# Patient Record
Sex: FEMALE | Race: WHITE | Employment: OTHER | URBAN - METROPOLITAN AREA
[De-identification: names, ages, dates, MRNs, and addresses within clinical notes are randomized per-mention and may not be internally consistent; named-entity substitution may affect disease eponyms.]

---

## 2017-03-25 DIAGNOSIS — E78.5 HYPERLIPIDEMIA LDL GOAL <130: ICD-10-CM

## 2017-03-27 RX ORDER — SIMVASTATIN 20 MG
TABLET ORAL
Qty: 90 TABLET | Refills: 1 | Status: SHIPPED | OUTPATIENT
Start: 2017-03-27 | End: 2017-09-25

## 2017-03-27 NOTE — TELEPHONE ENCOUNTER
Routing refill request to provider for review/approval because:  Labs not current:  Lipids out of date since 3/16/16  LOV was August  Would you like patient to come in for lipid lab only appointment?  Please advise.    Meri Cortez RN      simvastatin (ZOCOR) 20 MG tablet     Last Written Prescription Date: 9/28/16  Last Fill Quantity: 90, # refills: 1  Last Office Visit with [facility], [facility] or [facility] prescribing provider: 8/17/16       Lab Results   Component Value Date    CHOL 154 03/16/2016     Lab Results   Component Value Date    HDL 57 03/16/2016     Lab Results   Component Value Date    LDL 77 03/16/2016     Lab Results   Component Value Date    TRIG 99 03/16/2016     Lab Results   Component Value Date    CHOLHDLRATIO 2.8 08/19/2015

## 2017-08-16 ENCOUNTER — HOSPITAL ENCOUNTER (OUTPATIENT)
Dept: MAMMOGRAPHY | Facility: CLINIC | Age: 82
Discharge: HOME | End: 2017-08-16
Attending: PHYSICIAN ASSISTANT | Admitting: PHYSICIAN ASSISTANT
Payer: MEDICARE

## 2017-08-16 DIAGNOSIS — Z12.31 VISIT FOR SCREENING MAMMOGRAM: ICD-10-CM

## 2017-08-16 PROCEDURE — G0202 SCR MAMMO BI INCL CAD: HCPCS

## 2017-09-15 ENCOUNTER — HOSPITAL ENCOUNTER (EMERGENCY)
Facility: CLINIC | Age: 82
Discharge: HOME | End: 2017-09-15
Attending: EMERGENCY MEDICINE | Admitting: EMERGENCY MEDICINE
Payer: MEDICARE

## 2017-09-15 ENCOUNTER — OFFICE VISIT (OUTPATIENT)
Dept: URGENT CARE | Facility: URGENT CARE | Age: 82
End: 2017-09-15

## 2017-09-15 ENCOUNTER — APPOINTMENT (OUTPATIENT)
Dept: GENERAL RADIOLOGY | Facility: CLINIC | Age: 82
End: 2017-09-15
Attending: EMERGENCY MEDICINE
Payer: MEDICARE

## 2017-09-15 VITALS
HEIGHT: 60 IN | TEMPERATURE: 97.5 F | BODY MASS INDEX: 22.38 KG/M2 | DIASTOLIC BLOOD PRESSURE: 71 MMHG | RESPIRATION RATE: 20 BRPM | WEIGHT: 114 LBS | HEART RATE: 77 BPM | OXYGEN SATURATION: 98 % | SYSTOLIC BLOOD PRESSURE: 183 MMHG

## 2017-09-15 VITALS
TEMPERATURE: 97 F | DIASTOLIC BLOOD PRESSURE: 59 MMHG | OXYGEN SATURATION: 95 % | HEART RATE: 82 BPM | SYSTOLIC BLOOD PRESSURE: 139 MMHG

## 2017-09-15 DIAGNOSIS — R53.81 MALAISE AND FATIGUE: Primary | ICD-10-CM

## 2017-09-15 DIAGNOSIS — N39.0 URINARY TRACT INFECTION WITHOUT HEMATURIA, SITE UNSPECIFIED: ICD-10-CM

## 2017-09-15 DIAGNOSIS — R53.83 MALAISE AND FATIGUE: Primary | ICD-10-CM

## 2017-09-15 DIAGNOSIS — R53.83 FATIGUE, UNSPECIFIED TYPE: ICD-10-CM

## 2017-09-15 DIAGNOSIS — I35.1 AORTIC VALVE REGURGITATION, UNSPECIFIED ETIOLOGY: ICD-10-CM

## 2017-09-15 LAB
ALBUMIN UR-MCNC: NEGATIVE MG/DL
ANION GAP SERPL CALCULATED.3IONS-SCNC: 6 MMOL/L (ref 3–14)
APPEARANCE UR: ABNORMAL
BACTERIA #/AREA URNS HPF: ABNORMAL /HPF
BASOPHILS # BLD AUTO: 0 10E9/L (ref 0–0.2)
BASOPHILS NFR BLD AUTO: 0.5 %
BILIRUB UR QL STRIP: NEGATIVE
BUN SERPL-MCNC: 18 MG/DL (ref 7–30)
CALCIUM SERPL-MCNC: 8.6 MG/DL (ref 8.5–10.1)
CHLORIDE SERPL-SCNC: 106 MMOL/L (ref 94–109)
CO2 SERPL-SCNC: 28 MMOL/L (ref 20–32)
COLOR UR AUTO: YELLOW
CREAT SERPL-MCNC: 0.99 MG/DL (ref 0.52–1.04)
DIFFERENTIAL METHOD BLD: NORMAL
EOSINOPHIL # BLD AUTO: 0.2 10E9/L (ref 0–0.7)
EOSINOPHIL NFR BLD AUTO: 2.7 %
ERYTHROCYTE [DISTWIDTH] IN BLOOD BY AUTOMATED COUNT: 13.5 % (ref 10–15)
GFR SERPL CREATININE-BSD FRML MDRD: 53 ML/MIN/1.7M2
GLUCOSE SERPL-MCNC: 92 MG/DL (ref 70–99)
GLUCOSE UR STRIP-MCNC: NEGATIVE MG/DL
HCT VFR BLD AUTO: 46.6 % (ref 35–47)
HGB BLD-MCNC: 15.6 G/DL (ref 11.7–15.7)
HGB UR QL STRIP: NEGATIVE
IMM GRANULOCYTES # BLD: 0 10E9/L (ref 0–0.4)
IMM GRANULOCYTES NFR BLD: 0.2 %
KETONES UR STRIP-MCNC: NEGATIVE MG/DL
LEUKOCYTE ESTERASE UR QL STRIP: ABNORMAL
LYMPHOCYTES # BLD AUTO: 2.4 10E9/L (ref 0.8–5.3)
LYMPHOCYTES NFR BLD AUTO: 35.6 %
MCH RBC QN AUTO: 32.1 PG (ref 26.5–33)
MCHC RBC AUTO-ENTMCNC: 33.5 G/DL (ref 31.5–36.5)
MCV RBC AUTO: 96 FL (ref 78–100)
MONOCYTES # BLD AUTO: 0.7 10E9/L (ref 0–1.3)
MONOCYTES NFR BLD AUTO: 10.2 %
MUCOUS THREADS #/AREA URNS LPF: PRESENT /LPF
NEUTROPHILS # BLD AUTO: 3.4 10E9/L (ref 1.6–8.3)
NEUTROPHILS NFR BLD AUTO: 50.8 %
NITRATE UR QL: NEGATIVE
NRBC # BLD AUTO: 0 10*3/UL
NRBC BLD AUTO-RTO: 0 /100
PH UR STRIP: 5.5 PH (ref 5–7)
PLATELET # BLD AUTO: 195 10E9/L (ref 150–450)
POTASSIUM SERPL-SCNC: 4.4 MMOL/L (ref 3.4–5.3)
RBC # BLD AUTO: 4.86 10E12/L (ref 3.8–5.2)
RBC #/AREA URNS AUTO: ABNORMAL /HPF
SODIUM SERPL-SCNC: 140 MMOL/L (ref 133–144)
SOURCE: ABNORMAL
SP GR UR STRIP: 1.02 (ref 1–1.03)
TROPONIN I SERPL-MCNC: <0.015 UG/L (ref 0–0.04)
TSH SERPL DL<=0.005 MIU/L-ACNC: 2.47 MU/L (ref 0.4–4)
UROBILINOGEN UR STRIP-ACNC: 0.2 EU/DL (ref 0.2–1)
WBC # BLD AUTO: 6.7 10E9/L (ref 4–11)
WBC #/AREA URNS AUTO: ABNORMAL /HPF

## 2017-09-15 PROCEDURE — 85025 COMPLETE CBC W/AUTO DIFF WBC: CPT | Performed by: EMERGENCY MEDICINE

## 2017-09-15 PROCEDURE — 81001 URINALYSIS AUTO W/SCOPE: CPT | Performed by: EMERGENCY MEDICINE

## 2017-09-15 PROCEDURE — 87086 URINE CULTURE/COLONY COUNT: CPT | Performed by: EMERGENCY MEDICINE

## 2017-09-15 PROCEDURE — 71020 XR CHEST 2 VW: CPT

## 2017-09-15 PROCEDURE — 99285 EMERGENCY DEPT VISIT HI MDM: CPT | Mod: 25

## 2017-09-15 PROCEDURE — 93005 ELECTROCARDIOGRAM TRACING: CPT

## 2017-09-15 PROCEDURE — 25000132 ZZH RX MED GY IP 250 OP 250 PS 637: Mod: GY | Performed by: EMERGENCY MEDICINE

## 2017-09-15 PROCEDURE — 84443 ASSAY THYROID STIM HORMONE: CPT | Performed by: EMERGENCY MEDICINE

## 2017-09-15 PROCEDURE — 80048 BASIC METABOLIC PNL TOTAL CA: CPT | Performed by: EMERGENCY MEDICINE

## 2017-09-15 PROCEDURE — 84484 ASSAY OF TROPONIN QUANT: CPT | Performed by: EMERGENCY MEDICINE

## 2017-09-15 PROCEDURE — A9270 NON-COVERED ITEM OR SERVICE: HCPCS | Mod: GY | Performed by: EMERGENCY MEDICINE

## 2017-09-15 RX ORDER — CIPROFLOXACIN 500 MG/1
500 TABLET, FILM COATED ORAL ONCE
Status: COMPLETED | OUTPATIENT
Start: 2017-09-15 | End: 2017-09-15

## 2017-09-15 RX ORDER — CIPROFLOXACIN 500 MG/1
500 TABLET, FILM COATED ORAL 2 TIMES DAILY
Qty: 10 TABLET | Refills: 0 | Status: SHIPPED | OUTPATIENT
Start: 2017-09-15 | End: 2017-09-20

## 2017-09-15 RX ADMIN — CIPROFLOXACIN HYDROCHLORIDE 500 MG: 500 TABLET, FILM COATED ORAL at 22:47

## 2017-09-15 ASSESSMENT — ENCOUNTER SYMPTOMS
SHORTNESS OF BREATH: 0
NAUSEA: 0
FATIGUE: 1
ABDOMINAL PAIN: 0
WEAKNESS: 1

## 2017-09-15 NOTE — ED AVS SNAPSHOT
Emergency Department    [address] [place] [address]    Phone:  [phone]    Fax:  [phone]                                       Polly De La Torre   MRN: [id number]    Department:   Emergency Department   Date of Visit:  9/15/2017           After Visit Summary Signature Page     I have received my discharge instructions, and my questions have been answered. I have discussed any challenges I see with this plan with the nurse or doctor.    ..........................................................................................................................................  Patient/Patient Representative Signature      ..........................................................................................................................................  Patient Representative Print Name and Relationship to Patient    ..................................................               ................................................  Date                                            Time    ..........................................................................................................................................  Reviewed by Signature/Title    ...................................................              ..............................................  Date                                                            Time

## 2017-09-15 NOTE — ED AVS SNAPSHOT
Emergency Department    [address] [facility] [address]    Phone:  [phone]    Fax:  [phone]                                       Polly De La Torre   MRN: [id number]    Department:   Emergency Department   Date of Visit:  9/15/2017           Patient Information     Date Of Birth          9/11/1931        Your diagnoses for this visit were:     Urinary tract infection without hematuria, site unspecified     Fatigue, unspecified type     Aortic valve regurgitation, unspecified etiology        You were seen by Debbie Pierre MD.      Follow-up Information     Follow up with Theresa Frank DO. Schedule an appointment as soon as possible for a visit in 3 days.    Specialty:  Internal Medicine    Contact information:    [address] [address] [phone]          Follow up with  Emergency Department.    Specialty:  EMERGENCY MEDICINE    Why:  As needed, If symptoms worsen    Contact information:    [address] [facility] [address] [phone]        Discharge Instructions       Discharge Instructions  Urinary Tract Infection  You have urinary tract infection, or UTI. The urinary tract includes the kidneys (which make urine), ureters (the tubes that carry urine from the kidneys to the bladder), the bladder (which stores urine), and urethra (the tube that carries urine out of the bladder).  Urinary tract infections occur when bacteria travel up the urethra into the bladder. We suspect a UTI based on chemical and microscopic findings in your urine, but if there is a question about your findings, we will do a culture to see if bacteria grow. A urine culture takes several days. You should always follow-up with your primary physician to find out about results of your culture if one was done.   Return to the Emergency Department if:    You have severe back pain.    You are vomiting so that you can t take your medicine, or have signs of  dehydration (such as urinating less than 3 times per day).    You have fever over 101.5 degrees F.    You have significant confusion or are very weak, or feel very ill.    Your child seems much more ill, won t wake up, won t respond right, or is crying for a long time and won t calm down.    Your child is showing signs of dehydration, Signs of dehydration can be:  o Your infant has had no wet diapers in 4-5 hours.  o Your older child has not passed urine in 6-8 hours.  o Your infant or child starts to have dry mouth and lips, or no saliva or tears.    Follow-up with your doctor:     Children under 24 months need to be seen by their regular doctor within one week after a diagnosis of a UTI. It may be necessary to do some imaging tests to look at the child s kidney or bladder.    You should begin to feel better within 24 - 48 hours of starting your antibiotic.  If you do not, you need to be seen again.      Treatment:     You will be treated with an antibiotic to kill the bacteria. We have to make an educated guess as to which antibiotic will work for your infection. In most healthy people, we can guess right almost all of the time. Sometimes a culture is done to show which antibiotics will work. This usually takes 2-3 days. When the culture is done, we may have to contact you to put you on a different antibiotic.    Take a pain medication such as Tylenol  (acetaminophen), Advil  (ibuprofen), Nuprin  (ibuprofen), or Aleve  (naproxen). If you have been given a narcotic such as Vicodin  (hydrocodone with acetaminophen), Percocet  (oxycodone with acetaminophen), or codeine, do not drive for four hours after you have taken it. If the narcotic contains Tylenol  (acetaminophen), do not take Tylenol  with it. All narcotics will cause constipation, so eat a high fiber diet.      Pyridium  (phenazopyridine) or Uristat  (phenazopyridine) is a prescription medication that numbs the bladder to reduce the burning pain of some  "UTIs.  The same medication is available in a non-prescription version called Azo-Standard  (phenazopyridine), Urodol  (phenazopyridine), or other brand names. This medication will change the color of the urine and tears (usually blue or orange). If you wear contacts, do not wear them while taking this medication as they may be stained by the medication.    Antibiotic Warning:     If you have been placed on antibiotics - watch for signs of allergic reaction.  These include rash, lip swelling, difficulty breathing, wheezing, and dizziness.  If you develop any of these symptoms, stop the antibiotic immediately and go to an emergency room or urgent care for evaluation.    Probiotics: If you have been given an antibiotic, you may want to also take a probiotic pill or eat yogurt with live cultures. Probiotics have \"good bacteria\" to help your intestines stay healthy. Studies have shown that probiotics help prevent diarrhea and other intestine problems (including C. diff infection) when you take antibiotics. You can buy these without a prescription in the pharmacy section of the store.   If you were given a prescription for medicine here today, be sure to read all of the information (including the package insert) that comes with your prescription.  This will include important information about the medicine, its side effects, and any warnings that you need to know about.  The pharmacist who fills the prescription can provide more information and answer questions you may have about the medicine.  If you have questions or concerns that the pharmacist cannot address, please call or return to the Emergency Department.   Opioid Medication Information    Pain medications are among the most commonly prescribed medicines, so we are including this information for all our patients. If you did not receive pain medication or get a prescription for pain medicine, you can ignore it.     You may have been given a prescription for an " opioid (narcotic) pain medicine and/or have received a pain medicine while here in the Emergency Department. These medicines can make you drowsy or impaired. You must not drive, operate dangerous equipment, or engage in any other dangerous activities while taking these medications. If you drive while taking these medications, you could be arrested for DUI, or driving under the influence. Do not drink any alcohol while you are taking these medications.     Opioid pain medications can cause addiction. If you have a history of chemical dependency of any type, you are at a higher risk of becoming addicted to pain medications.  Only take these prescribed medications to treat your pain when all other options have been tried. Take it for as short a time and as few doses as possible. Store your pain pills in a secure place, as they are frequently stolen and provide a dangerous opportunity for children or visitors in your house to start abusing these powerful medications. We will not replace any lost or stolen medicine.  As soon as your pain is better, you should flush all your remaining medication.     Many prescription pain medications contain Tylenol  (acetaminophen), including Vicodin , Tylenol #3 , Norco , Lortab , and Percocet .  You should not take any extra pills of Tylenol  if you are using these prescription medications or you can get very sick.  Do not ever take more than 3000 mg of acetaminophen in any 24 hour period.    All opioids tend to cause constipation. Drink plenty of water and eat foods that have a lot of fiber, such as fruits, vegetables, prune juice, apple juice and high fiber cereal.  Take a laxative if you don t move your bowels at least every other day. Miralax , Milk of Magnesia, Colace , or Senna  can be used to keep you regular.      Remember that you can always come back to the Emergency Department if you are not able to see your regular doctor in the amount of time listed above, if you get any  new symptoms, or if there is anything that worries you.        Future Appointments        Provider Department Dept Phone Center    10/13/2017 3:30 PM Threesa Frank,  [facility] [phone]       24 Hour Appointment Hotline       To make an appointment at any [facility], call [phone] ([phone]). If you don't have a family doctor or clinic, we will help you find one. [facility] are conveniently located to serve the needs of you and your family.             Review of your medicines      START taking        Dose / Directions Last dose taken    ciprofloxacin 500 MG tablet   Commonly known as:  CIPRO   Dose:  500 mg   Quantity:  10 tablet        Take 1 tablet (500 mg) by mouth 2 times daily for 5 days   Refills:  0          Our records show that you are taking the medicines listed below. If these are incorrect, please call your family doctor or clinic.        Dose / Directions Last dose taken    aspirin 81 MG tablet   Dose:  81 mg        Take 1 tablet (81 mg) by mouth daily   Refills:  0        simvastatin 20 MG tablet   Commonly known as:  ZOCOR   Quantity:  90 tablet        TAKE 1 TABLET AT BEDTIME   Refills:  1        TYLENOL 500 MG tablet   Dose:  500-1000 mg   Generic drug:  acetaminophen        Take 500-1,000 mg by mouth every 6 hours as needed for mild pain   Refills:  0                Prescriptions were sent or printed at these locations (1 Prescription)                   Other Prescriptions                Printed at Department/Unit printer (1 of 1)         ciprofloxacin (CIPRO) 500 MG tablet                Procedures and tests performed during your visit     Basic metabolic panel    CBC with platelets differential    Chest XR,  PA & LAT    EKG 12-lead, tracing only    TSH    Troponin I    UA reflex to Microscopic and Culture    Urine Culture Aerobic Bacterial    Urine Microscopic      Orders Needing Specimen Collection     None      Pending Results     Date and Time  Order Name Status Description    9/15/2017 2127 Urine Culture Aerobic Bacterial In process             Pending Culture Results     Date and Time Order Name Status Description    9/15/2017 2127 Urine Culture Aerobic Bacterial In process             Pending Results Instructions     If you had any lab results that were not finalized at the time of your Discharge, you can call the ED Lab Result RN at [phone]. You will be contacted by this team for any positive Lab results or changes in treatment. The nurses are available 7 days a week from 10A to 6:30P.  You can leave a message 24 hours per day and they will return your call.        Test Results From Your Hospital Stay        9/15/2017  9:27 PM      Component Results     Component Value Ref Range & Units Status    WBC 6.7 4.0 - 11.0 10e9/L Final    RBC Count 4.86 3.8 - 5.2 10e12/L Final    Hemoglobin 15.6 11.7 - 15.7 g/dL Final    Hematocrit 46.6 35.0 - 47.0 % Final    MCV 96 78 - 100 fl Final    MCH 32.1 26.5 - 33.0 pg Final    MCHC 33.5 31.5 - 36.5 g/dL Final    RDW 13.5 10.0 - 15.0 % Final    Platelet Count 195 150 - 450 10e9/L Final    Diff Method Automated Method  Final    % Neutrophils 50.8 % Final    % Lymphocytes 35.6 % Final    % Monocytes 10.2 % Final    % Eosinophils 2.7 % Final    % Basophils 0.5 % Final    % Immature Granulocytes 0.2 % Final    Nucleated RBCs 0 0 /100 Final    Absolute Neutrophil 3.4 1.6 - 8.3 10e9/L Final    Absolute Lymphocytes 2.4 0.8 - 5.3 10e9/L Final    Absolute Monocytes 0.7 0.0 - 1.3 10e9/L Final    Absolute Eosinophils 0.2 0.0 - 0.7 10e9/L Final    Absolute Basophils 0.0 0.0 - 0.2 10e9/L Final    Abs Immature Granulocytes 0.0 0 - 0.4 10e9/L Final    Absolute Nucleated RBC 0.0  Final         9/15/2017  9:41 PM      Component Results     Component Value Ref Range & Units Status    Sodium 140 133 - 144 mmol/L Final    Potassium 4.4 3.4 - 5.3 mmol/L Final    Chloride 106 94 - 109 mmol/L Final    Carbon Dioxide 28 20 - 32 mmol/L  Final    Anion Gap 6 3 - 14 mmol/L Final    Glucose 92 70 - 99 mg/dL Final    Urea Nitrogen 18 7 - 30 mg/dL Final    Creatinine 0.99 0.52 - 1.04 mg/dL Final    GFR Estimate 53 (L) >60 mL/min/1.7m2 Final    Non  GFR Calc    GFR Estimate If Black 64 >60 mL/min/1.7m2 Final    African American GFR Calc    Calcium 8.6 8.5 - 10.1 mg/dL Final         9/15/2017  9:46 PM      Component Results     Component Value Ref Range & Units Status    Troponin I ES <0.015 0.000 - 0.045 ug/L Final    The 99th percentile for upper reference range is 0.045 ug/L.  Troponin values   in the range of 0.045 - 0.120 ug/L may be associated with risks of adverse   clinical events.           9/15/2017  9:51 PM      Component Results     Component Value Ref Range & Units Status    TSH 2.47 0.40 - 4.00 mU/L Final         9/15/2017 10:08 PM      Narrative     CHEST TWO VIEWS  9/15/2017 9:58 PM     COMPARISON: Two view chest x-ray 1/12/2010    HISTORY: Weakness, murmur    FINDINGS: The cardiac silhouette, pulmonary vasculature, lungs and  pleural spaces are within normal limits.        Impression     IMPRESSION: Clear lungs.    REMI TANG MD         9/15/2017  9:47 PM      Component Results     Component Value Ref Range & Units Status    Color Urine Yellow  Final    Appearance Urine Slightly Cloudy  Final    Glucose Urine Negative NEG^Negative mg/dL Final    Bilirubin Urine Negative NEG^Negative Final    Ketones Urine Negative NEG^Negative mg/dL Final    Specific Gravity Urine 1.025 1.003 - 1.035 Final    Blood Urine Negative NEG^Negative Final    pH Urine 5.5 5.0 - 7.0 pH Final    Protein Albumin Urine Negative NEG^Negative mg/dL Final    Urobilinogen Urine 0.2 0.2 - 1.0 EU/dL Final    Nitrite Urine Negative NEG^Negative Final    Leukocyte Esterase Urine Moderate (A) NEG^Negative Final    Source Midstream Urine  Final         9/15/2017  9:41 PM         9/15/2017  9:47 PM      Component Results     Component Value Ref Range & Units  Status    WBC Urine 25-50 (A) OTO2^O - 2 /HPF Final    RBC Urine O - 2 OTO2^O - 2 /HPF Final    Bacteria Urine Moderate (A) NEG^Negative /HPF Final    Mucous Urine Present (A) NEG^Negative /LPF Final                Clinical Quality Measure: Blood Pressure Screening     Your blood pressure was checked while you were in the emergency department today. The last reading we obtained was  BP: 162/67 . Please read the guidelines below about what these numbers mean and what you should do about them.  If your systolic blood pressure (the top number) is less than 120 and your diastolic blood pressure (the bottom number) is less than 80, then your blood pressure is normal. There is nothing more that you need to do about it.  If your systolic blood pressure (the top number) is 120-139 or your diastolic blood pressure (the bottom number) is 80-89, your blood pressure may be higher than it should be. You should have your blood pressure rechecked within a year by a primary care provider.  If your systolic blood pressure (the top number) is 140 or greater or your diastolic blood pressure (the bottom number) is 90 or greater, you may have high blood pressure. High blood pressure is treatable, but if left untreated over time it can put you at risk for heart attack, stroke, or kidney failure. You should have your blood pressure rechecked by a primary care provider within the next 4 weeks.  If your provider in the emergency department today gave you specific instructions to follow-up with your doctor or provider even sooner than that, you should follow that instruction and not wait for up to 4 weeks for your follow-up visit.        Thank you for choosing [facility]       Thank you for choosing [facility] for your care. Our goal is always to provide you with excellent care. Hearing back from our patients is one way we can continue to improve our services. Please take a few minutes to complete the written survey that you may receive in the  "mail after you visit with us. Thank you!        [employer] Information     [employer] lets you send messages to your doctor, view your test results, renew your prescriptions, schedule appointments and more. To sign up, go to [email or web address] . Click on \"Log in\" on the left side of the screen, which will take you to the Welcome page. Then click on \"Sign up Now\" on the right side of the page.     You will be asked to enter the access code listed below, as well as some personal information. Please follow the directions to create your username and password.     Your access code is: [email or web address]  Expires: 2017  5:41 PM     Your access code will  in 90 days. If you need help or a new code, please call your [facility] clinic or [phone].        Care EveryWhere ID     This is your Care EveryWhere ID. This could be used by other organizations to access your [facility] medical records  [id number]        Equal Access to Services     [facility] : Hadii robert humphreyo Sokai, waaxda luqadaha, qaybta kaalmada aderobertayanohelia, jasmina luciano . So [facility] [place] [facility] [phone].    ATENCIÓN: Si habla español, tiene a sharma disposición servicios gratuitos de asistencia lingüística. Llame al [phone].    We comply with applicable federal civil rights laws and [place] laws. We do not discriminate on the basis of race, color, national origin, age, disability sex, sexual orientation or gender identity.            After Visit Summary       This is your record. Keep this with you and show to your community pharmacist(s) and doctor(s) at your next visit.                  "

## 2017-09-15 NOTE — NURSING NOTE
"Chief Complaint   Patient presents with     Urgent Care     Fatigue     Patient was with her  at his cardiology apt and his provider noticed that wife didn't look too well; provider exam the wife and suggest that they come here for a visit.        Initial /59 (BP Location: Left arm, Cuff Size: Adult Regular)  Pulse 82  Temp 97  F (36.1  C) (Oral)  SpO2 95%  Breastfeeding? No Estimated body mass index is 24.22 kg/(m^2) as calculated from the following:    Height as of 8/17/16: 4' 10\" (1.473 m).    Weight as of 8/17/16: 115 lb 14.4 oz (52.6 kg).  Medication Reconciliation: complete.  PAMELA Villalobos       "

## 2017-09-15 NOTE — PROGRESS NOTES
Pt came here for fatiarabella, she was in a cardiology office for her  appointment apparently she was noted to be quite fatique and the cardiology was quite concern and do a quick physical exam, it was noted that she has heart murmur and cardiology told her to come to the urgent care, in the urgent care I do noted that she is rather unable to focus, she does admit she has been feeling like this for 6 months or more, she apparently very rare to see her PCP, she has history or hypercholesterolemia but no other medical issue, physical exam noted that she has murmur, at this time I do think pt need to be evaluated further in the ER, she might need to have cardiac work up due to  Murmur and also fatigue, pt and  understand and agreeable with this plan, all question answered.

## 2017-09-15 NOTE — MR AVS SNAPSHOT
"              After Visit Summary   9/15/2017    Polly De La Torre    MRN: [id number]           Patient Information     Date Of Birth          9/11/1931        Visit Information        Provider Department      9/15/2017 5:00 PM Fanny Duke MD [facility]        Today's Diagnoses     Malaise and fatigue    -  1       Follow-ups after your visit        Your next 10 appointments already scheduled     Oct 13, 2017  3:30 PM CDT   Office Visit with Theresa Frank,    [facility] ([facility])    [address] [facility] [address] [phone]           Bring a current list of meds and any records pertaining to this visit. For Physicals, please bring immunization records and any forms needing to be filled out. Please arrive 10 minutes early to complete paperwork.              Who to contact     If you have questions or need follow up information about today's clinic visit or your schedule please contact [facility] directly at [phone].  Normal or non-critical lab and imaging results will be communicated to you by [employer], letter or phone within 4 business days after the clinic has received the results. If you do not hear from us within 7 days, please contact the clinic through [employer] or phone. If you have a critical or abnormal lab result, we will notify you by phone as soon as possible.  Submit refill requests through [employer] or call your pharmacy and they will forward the refill request to us. Please allow 3 business days for your refill to be completed.          Additional Information About Your Visit        [employer] Information     [employer] lets you send messages to your doctor, view your test results, renew your prescriptions, schedule appointments and more. To sign up, go to [email or web address] . Click on \"Log in\" on the left side of the screen, which will take you to the Welcome page. Then click on \"Sign up " "Now\" on the right side of the page.     You will be asked to enter the access code listed below, as well as some personal information. Please follow the directions to create your username and password.     Your access code is: [email or web address]  Expires: 2017  5:41 PM     Your access code will  in 90 days. If you need help or a new code, please call your [place] clinic or [phone].        Care EveryWhere ID     This is your Care EveryWhere ID. This could be used by other organizations to access your [place] medical records  [id number]        Your Vitals Were     Pulse Temperature Pulse Oximetry Breastfeeding?          82 97  F (36.1  C) (Oral) 95% No         Blood Pressure from Last 3 Encounters:   09/15/17 139/59   16 115/64   16 138/60    Weight from Last 3 Encounters:   16 115 lb 14.4 oz (52.6 kg)   16 121 lb 11.2 oz (55.2 kg)   11/03/15 122 lb (55.3 kg)              Today, you had the following     No orders found for display       Primary Care Provider Office Phone # Fax #    Theresa Renteria Monika,  [phone] [address] [facility] [address]  [place] [address]        Equal Access to Services     [facility]: Hadii aad ku hadasho Soomaali, waaxda luqadaha, qaybta kaalmada adeegyada, waxay maranda hayduek luciano . So [facility] [phone].    ATENCIÓN: Si habla español, tiene a sharma disposición servicios gratuitos de asistencia lingüística. Llame al [phone].    We comply with applicable federal civil rights laws and [place] laws. We do not discriminate on the basis of race, color, national origin, age, disability sex, sexual orientation or gender identity.            Thank you!     Thank you for choosing [facility]  for your care. Our goal is always to provide you with excellent care. Hearing back from our patients is one way we can continue to improve our services. Please take a few minutes to complete the written survey that " you may receive in the mail after your visit with us. Thank you!             Your Updated Medication List - Protect others around you: Learn how to safely use, store and throw away your medicines at www.disposemymeds.org.          This list is accurate as of: 9/15/17  5:41 PM.  Always use your most recent med list.                   Brand Name Dispense Instructions for use Diagnosis    aspirin 81 MG tablet      Take 1 tablet (81 mg) by mouth daily    Hyperlipidemia LDL goal <130       simvastatin 20 MG tablet    ZOCOR    90 tablet    TAKE 1 TABLET AT BEDTIME    Hyperlipidemia LDL goal <130       TYLENOL 500 MG tablet   Generic drug:  acetaminophen      Take 500-1,000 mg by mouth every 6 hours as needed for mild pain

## 2017-09-16 ENCOUNTER — NURSE TRIAGE (OUTPATIENT)
Dept: NURSING | Facility: CLINIC | Age: 82
End: 2017-09-16

## 2017-09-16 LAB — INTERPRETATION ECG - MUSE: NORMAL

## 2017-09-16 NOTE — ED NOTES
"Pt to room, placed on monitor. Pt a&ox4, respirations even and unlabored, breath sounds clear. Pt states she has been feeling weak for 6 months. Today the patient was at her husbands cardiology appointment, and the cardiologist suggested that she have blood work done for her \"weakness\". Pt went to an urgent care and was told to come to the ER. Pt denies SOB, chest pain, nausea, vomiting, and fevers. Pt states she is still able to get up and out of bed and continue her ADLs but just has generalized weakness.  "

## 2017-09-16 NOTE — ED PROVIDER NOTES
History     Chief Complaint:  Generalized Weakness     HPI   Polly De La Torre is a 86 year old female who presents to the emergency department today for evaluation of generalized weakness. The patient reports progressively worsening fatigue for the past 6 months. The patient's  states that they were at his Cardiologist today who noticed a heart murmur and sent them to Urgent Care. The physician at Urgent Care referred her to the emergency department for evaluation. The patient states that she has been told that she has a heart murmur before. The patient denies any chest pain, leg swelling, abdominal pain, nausea, shortness of breath or fevers.    Allergies:  Cetirizine   Darvon  Methadone      Medications:    Simvastatin (ZOCOR) 20 mg tablet  Aspirin 81 mg tablet    Past Medical History:    Aortic regurgitation   Aortic valve disorders   Choledocholithiasis   Esophageal reflux   Gallstone pancreatitis   Osteoarthrosis, unspecified whether generalized or localized, unspecified site   Osteopenia     Past Surgical History:    Appendectomy   D&C x2  Cholecystectomy     Family History:    Hypertension   Cerebrovascular disease     Social History:  The patient was accompanied to the ED by her  and her son.  Smoking Status: Never smoker   Smokeless Tobacco: Never used   Alcohol Use: Rarely    Marital Status:        Review of Systems   Constitutional: Positive for fatigue.   Respiratory: Negative for shortness of breath.    Cardiovascular: Negative for chest pain and leg swelling.   Gastrointestinal: Negative for abdominal pain and nausea.   Neurological: Positive for weakness.   10 point review of systems performed and is negative except as above and in HPI.     Physical Exam     Patient Vitals for the past 24 hrs:   BP Temp Temp src Pulse Resp SpO2 Height Weight   09/15/17 2100 162/67 - - - - 98 % - -   09/15/17 2030 174/65 - - - - 95 % - -   09/15/17 2000 170/69 - - - - 98 % - -   09/15/17  1941 - - - - - 98 % - -   09/15/17 1940 172/77 - - - - - - -   09/15/17 1749 125/63 97.8  F (36.6  C) Oral 70 16 96 % 1.524 m (5') 51.7 kg (114 lb)      Physical Exam  General: Resting on the gurney, appears comfortable. No pallor, no diaphoresis.  Head:  The scalp, face, and head appear normal  Mouth/Throat: Mucus membranes are moist  CV:  Regular rate    Normal S1 and S2    Systolic murmur best heard at the sternal border  Resp:  Breath sounds clear and equal bilaterally    Non-labored, no retractions or accessory muscle use    No coarseness    No wheezing   GI:  Abdomen is soft, no rigidity    No tenderness to palpation  MS:  Normal motor assessment of all extremities.    Good capillary refill noted.  Skin:  No rash or lesions noted.  Neuro:   Speech is normal and fluent. No apparent deficit.  Psych: Awake. Alert.  Normal affect.      Appropriate interactions.    Emergency Department Course     ECG:  Indication: Generalized Weakness   Completed at 1758.  Read at 1805.   Normal sinus rhythm  Minimal voltage criteria for LVH, may be normal variant  Borderline ECG  Rate 76 bpm. VA interval 158. QRS duration 80. QT/QTc 390/438. P-R-T axes 24 -15 47.     Imaging:  Radiology findings were communicated with the patient who voiced understanding of the findings.    Chest XR, PA&LAT  IMPRESSION: Clear lungs.  Report per radiology      Interventions:   2247: Cipro 500mg PO    Laboratory:  Laboratory findings were communicated with the patient who voiced understanding of the findings.  CBC: AWNL. (WBC 6.7, HGB 15.6, )   BMP: GFR 53 (L) o/w WNL (Creatinine 0.99)  Troponin (Collected 2117): <0.015   TSH: 2.47   UA with Microscopic: moderate leukocyte esterase, WBC/HPF 25-50, moderate bacteria, mucous present o/w WNL   Urine Culture Aerobic Bacterial: Pending    Emergency Department Course:  Nursing notes and vitals reviewed.  2037: I performed an exam of the patient as documented above.   EKG obtained in the ED, see  results above.    The patient was sent for a Chest XR, PA&LAT while in the emergency department, results above.    IV was inserted and blood was drawn for laboratory testing, results above.   The patient provided a urine sample here in the emergency department. This was sent for laboratory testing, findings above.   2232: Patient rechecked and updated.   I discussed the treatment plan with the patient. They expressed understanding of this plan and consented to discharge. They will be discharged home with instructions for care and follow up. In addition, the patient will return to the emergency department if their symptoms persist, worsen, if new symptoms arise or if there is any concern.  All questions were answered.   I personally reviewed the laboratory and imaging results with the Patient and answered all related questions prior to discharge.     Impression & Plan      Medical Decision Making:  Polly De La Torre is a 86 year old female who presents to the emergency department for generalized fatigue. This has been ongoing for several months but became an issue today when the patient was with her  at the Cardiologist's office and didn't feel good. The Cardiologist listened to her heart and noted an aortic murmur, which prompting him to be concerned and sending her to Urgent Care. The Urgent Care was concerned about what was thought to be a new murmur and sent her to the emergency department. Review of her records do indicate that she has a prior diagnosis of aortic regurgitation, this is how her murmur sounds today. I do think that she should have ongoing management of this therefore, I did order an outpatient echo and Cardiology follow up as she has not established care with Cardiology up here. Further evaluation was largely unremarkable other than a UTI. The patient was given first dose of Cipro here and prescription for antibiotics as an outpatient. She denies having a primary care physician  however we have records indicating that she sees Dr. Theresa Frank. She will follow up with Dr. Frank this week. As noted, the patient's symptoms have been ongoing for months and I do not believe that there is an acute, dangerous cause. I am comfortable discharging her with further follow up as an outpatient.    Diagnosis:    ICD-10-CM    1. Urinary tract infection without hematuria, site unspecified N39.0    2. Fatigue, unspecified type R53.83    3. Aortic valve regurgitation, unspecified etiology I35.1      Disposition:  discharged to home with below prescription    Discharge Medications:  New Prescriptions    CIPROFLOXACIN (CIPRO) 500 MG TABLET    Take 1 tablet (500 mg) by mouth 2 times daily for 5 days     Scribe Disclosure:  I, Karolyn Taylor, am serving as a scribe at 8:42 PM on 9/15/2017 to document services personally performed by Debbie Pierre MD based on my observations and the provider's statements to me.    9/15/2017    EMERGENCY DEPARTMENT       Debbie Pierre MD  09/16/17 1911

## 2017-09-16 NOTE — TELEPHONE ENCOUNTER
Calling requesting appointment for Monday for ER follow up regarding UTI, taking meds and feeling better today, no triage required, connected with lorie to work on appt.    Sarah Robert RN, BSN  [facility]

## 2017-09-17 LAB
BACTERIA SPEC CULT: NORMAL
Lab: NORMAL
SPECIMEN SOURCE: NORMAL

## 2017-09-18 NOTE — PROGRESS NOTES
SUBJECTIVE:   Polly Lorenz is a 86 year old female who presents to clinic today for the following health issues:      ED/UC Followup:    Facility:  [facility]  Date of visit: 9/15/17  Reason for visit: uti  Current Status: AT HOME     Mrs Lorenz was with her  at his cardio appt and the MD noted that she did not look well and sent her to the  for eval.  She endorsed feeling tired and weak for 6 months.  At  sent then to [facility] ED.  CBC, BMP and TSH normal, Troponin x1 negative.  EKG low voltage.   Echo in 2015 demonstrated mod to severe aortic regurg, mild mitral regurg  Dx with UTI, culture inconclusive for normal ug kolby, on cipro 500 bid for 5 days and states she feels fine now, much better,   says somewhat better  She denies chest pain , SOB, headaches or dizzines.  She does all her own housework except that he has taken over vacuuming.       Problem list and histories reviewed & adjusted, as indicated.  Additional history: as documented    Patient Active Problem List   Diagnosis     Esophageal reflux     Osteoarthritis     HYPERLIPIDEMIA LDL GOAL <130     Advanced directives, counseling/discussion     Choledocholithiasis     Vitamin B12 deficiency (non anemic)     Fatigue     Aortic regurgitation     Memory loss     Past Surgical History:   Procedure Laterality Date     C NONSPECIFIC PROCEDURE  age 16    Appy     C NONSPECIFIC PROCEDURE      D&C x 2     CHOLECYSTECTOMY  1995    [facility]       Social History   Substance Use Topics     Smoking status: Never Smoker     Smokeless tobacco: Never Used     Alcohol use 0.0 oz/week     0 Standard drinks or equivalent per week      Comment: rarely     Family History   Problem Relation Age of Onset     Hypertension Father      CEREBROVASCULAR DISEASE Father      Allergies Son      Prostate Cancer No family hx of      Cancer - colorectal No family hx of          Current Outpatient Prescriptions   Medication Sig Dispense Refill     ciprofloxacin  "(CIPRO) 500 MG tablet Take 1 tablet (500 mg) by mouth 2 times daily for 5 days 10 tablet 0     simvastatin (ZOCOR) 20 MG tablet TAKE 1 TABLET AT BEDTIME 90 tablet 1     acetaminophen (TYLENOL) 500 MG tablet Take 500-1,000 mg by mouth every 6 hours as needed for mild pain       aspirin 81 MG tablet Take 1 tablet (81 mg) by mouth daily       Allergies   Allergen Reactions     Cetirizine      Made her so tired after taking doesn't want to take again,just can't live that way([employer])     Darvon [Propoxyphene]      Methadone      DARVON  ---  \"crawling\" sensation          Reviewed and updated as needed this visit by clinical staff       Reviewed and updated as needed this visit by Provider         ROS:  Constitutional, HEENT, cardiovascular, pulmonary, gi and gu systems are negative, except as otherwise noted.      OBJECTIVE:   /74 (BP Location: Left arm, Patient Position: Chair, Cuff Size: Adult Regular)  Pulse 66  Temp 97  F (36.1  C) (Oral)  Ht 5' (1.524 m)  Wt 115 lb 6.4 oz (52.3 kg)  SpO2 98%  BMI 22.54 kg/m2  Body mass index is 22.54 kg/(m^2).  GENERAL: healthy, alert and no distress  EYES: Eyes ; suborbital edema, PERRL and conjunctivae and sclerae normal  NECK: no adenopathy, no asymmetry, masses, or scars and thyroid normal to palpation, no upstroke  RESP: lungs clear to auscultation - no crackles, rhonchi or wheezes  CV: irregular rate and rhythm, normal S1 S2, no S3 or S4, 4/6 murmur loudest at right upper sternal border pulses strong  ABDOMEN: soft, nontender, no hepatosplenomegaly, no masses and bowel sounds normal  MS: no gross musculoskeletal defects noted, no edema  PSYCH: mentation appears mild dementia, affect normal/bright    Diagnostic Test Results:  Results for orders placed or performed in visit on 09/19/17   UA reflex to Microscopic and Culture   Result Value Ref Range    Color Urine Yellow     Appearance Urine Clear     Glucose Urine Negative NEG^Negative mg/dL    Bilirubin Urine Small " (A) NEG^Negative    Ketones Urine Negative NEG^Negative mg/dL    Specific Gravity Urine >1.030 1.003 - 1.035    Blood Urine Trace (A) NEG^Negative    pH Urine 5.0 5.0 - 7.0 pH    Protein Albumin Urine Trace (A) NEG^Negative mg/dL    Urobilinogen Urine 0.2 0.2 - 1.0 EU/dL    Nitrite Urine Negative NEG^Negative    Leukocyte Esterase Urine Trace (A) NEG^Negative    Source Midstream Urine    Urine Microscopic   Result Value Ref Range    WBC Urine O - 2 OTO2^O - 2 /HPF    RBC Urine O - 2 OTO2^O - 2 /HPF    Squamous Epithelial /LPF Urine Moderate (A) FEW^Few /LPF    Mucous Urine Present (A) NEG^Negative /LPF       ASSESSMENT/PLAN:       ICD-10-CM    1. H/O recurrent urinary tract infection Z87.440 UA reflex to Microscopic and Culture     Urine Microscopic   2. Aortic valve regurgitation, unspecified etiology I35.1 CARDIOLOGY PROCEDURE ADULT REFERRAL   3. Fatigue, unspecified type R53.83 CARDIOLOGY PROCEDURE ADULT REFERRAL   4. Generalized muscle weakness M62.81 CARDIOLOGY PROCEDURE ADULT REFERRAL       We are going get the echo moved up , (set for Wed 9/27/17 at 3:00) and plan follow up with Dr Frank  SHe has appt with Cardio 1st week of November    DIVINA Guerra [facility]

## 2017-09-19 ENCOUNTER — OFFICE VISIT (OUTPATIENT)
Dept: FAMILY MEDICINE | Facility: CLINIC | Age: 82
End: 2017-09-19
Payer: MEDICARE

## 2017-09-19 VITALS
OXYGEN SATURATION: 98 % | HEART RATE: 66 BPM | TEMPERATURE: 97 F | BODY MASS INDEX: 22.65 KG/M2 | DIASTOLIC BLOOD PRESSURE: 74 MMHG | HEIGHT: 60 IN | WEIGHT: 115.4 LBS | SYSTOLIC BLOOD PRESSURE: 146 MMHG

## 2017-09-19 DIAGNOSIS — Z87.440 H/O RECURRENT URINARY TRACT INFECTION: Primary | ICD-10-CM

## 2017-09-19 DIAGNOSIS — I35.1 AORTIC VALVE REGURGITATION, UNSPECIFIED ETIOLOGY: ICD-10-CM

## 2017-09-19 DIAGNOSIS — M62.81 GENERALIZED MUSCLE WEAKNESS: ICD-10-CM

## 2017-09-19 DIAGNOSIS — R53.83 FATIGUE, UNSPECIFIED TYPE: ICD-10-CM

## 2017-09-19 LAB
ALBUMIN UR-MCNC: ABNORMAL MG/DL
APPEARANCE UR: CLEAR
BILIRUB UR QL STRIP: ABNORMAL
COLOR UR AUTO: YELLOW
GLUCOSE UR STRIP-MCNC: NEGATIVE MG/DL
HGB UR QL STRIP: ABNORMAL
KETONES UR STRIP-MCNC: NEGATIVE MG/DL
LEUKOCYTE ESTERASE UR QL STRIP: ABNORMAL
MUCOUS THREADS #/AREA URNS LPF: PRESENT /LPF
NITRATE UR QL: NEGATIVE
NON-SQ EPI CELLS #/AREA URNS LPF: ABNORMAL /LPF
PH UR STRIP: 5 PH (ref 5–7)
RBC #/AREA URNS AUTO: ABNORMAL /HPF
SOURCE: ABNORMAL
SP GR UR STRIP: >1.03 (ref 1–1.03)
UROBILINOGEN UR STRIP-ACNC: 0.2 EU/DL (ref 0.2–1)
WBC #/AREA URNS AUTO: ABNORMAL /HPF

## 2017-09-19 PROCEDURE — 99214 OFFICE O/P EST MOD 30 MIN: CPT | Performed by: NURSE PRACTITIONER

## 2017-09-19 PROCEDURE — 81001 URINALYSIS AUTO W/SCOPE: CPT | Performed by: NURSE PRACTITIONER

## 2017-09-19 NOTE — NURSING NOTE
Chief Complaint   Patient presents with     ER Follow up     UTI        Initial /74 (BP Location: Left arm, Patient Position: Chair, Cuff Size: Adult Regular)  Pulse 66  Temp 97  F (36.1  C) (Oral)  Ht 5' (1.524 m)  Wt 115 lb 6.4 oz (52.3 kg)  SpO2 98%  BMI 22.54 kg/m2 Estimated body mass index is 22.54 kg/(m^2) as calculated from the following:    Height as of this encounter: 5' (1.524 m).    Weight as of this encounter: 115 lb 6.4 oz (52.3 kg)..    BP completed using cuff size: regular  MEDICATIONS REVIEWED  SOCIAL AND FAMILY HX REVIEWED  Jessi Taylor CMA

## 2017-09-19 NOTE — MR AVS SNAPSHOT
After Visit Summary   9/19/2017    Polly De La Torre    MRN: [id number]           Patient Information     Date Of Birth          9/11/1931        Visit Information        Provider Department      9/19/2017 9:00 AM Ester Kelly APRN CNP [facility]        Today's Diagnoses     H/O recurrent urinary tract infection    -  1    Aortic valve regurgitation, unspecified etiology        Fatigue, unspecified type        Generalized muscle weakness           Follow-ups after your visit        Additional Services     CARDIOLOGY PROCEDURE ADULT REFERRAL       Your provider has referred you to:   [facility]: [facility] - [place] [phone]   [email or web address]    Cardiology procedures to be completed: echocardiogram, reason for procedure mod to severe aortic regurgitation on echo 2015; c/o fatigue and weakness    Please be aware that coverage of these services is subject to the terms and limitations of your health insurance plan.  Call member services at your health plan with any benefit or coverage questions.     Please bring the following to your appointment:  >>   Any x-rays, CTs or MRIs which have been performed.  Contact the facility where they were done to arrange for  prior to your scheduled appointment.   >>   List of current medications  >>   This referral request   >>   Any documents/labs given to you for this referral    [facility]   For scheduling questions call [phone]    [facility]  For scheduling questions call [phone]                  Follow-up notes from your care team     Return in about 2 weeks (around 10/3/2017), or if symptoms worsen or fail to improve.      Your next 10 appointments already scheduled     Sep 27, 2017  3:00 PM CDT   Ech Complete with [id number]   [facility] CV Echocardiography (Cardiovascular Imaging at [facility])     "[address]  [address]  [facility] [id number] [phone]           1. Please bring or wear a comfortable two-piece outfit. 2. You may eat, drink and take your normal medicines. 3. For any questions that cannot be answered, please contact the ordering physician            Oct 13, 2017  3:30 PM CDT   [facility]    [address] [id number] [phone]           Bring a current list of meds and any records pertaining to this visit. For Physicals, please bring immunization records and any forms needing to be filled out. Please arrive 10 minutes early to complete paperwork.            Nov 06, 2017  2:15 PM CST   Return Visit with Igor Harrington MD   [facility] PHYSICIANS [facility] AT [place] ([facility])    [address]  [facility] [id number] [phone]              Who to contact     If you have questions or need follow up information about today's clinic visit or your schedule please contact [facility] directly at [phone].  Normal or non-critical lab and imaging results will be communicated to you by MyChart, letter or phone within 4 business days after the clinic has received the results. If you do not hear from us within 7 days, please contact the clinic through StudentFunderhart or phone. If you have a critical or abnormal lab result, we will notify you by phone as soon as possible.  Submit refill requests through MapSense or call your pharmacy and they will forward the refill request to us. Please allow 3 business days for your refill to be completed.          Additional Information About Your Visit        StudentFunderharAmind Information     MapSense lets you send messages to your doctor, view your test results, renew your prescriptions, schedule appointments and more. To sign up, go to [email or web address] . Click on \"Log in\" on the left side of the screen, which " "will take you to the Welcome page. Then click on \"Sign up Now\" on the right side of the page.     You will be asked to enter the access code listed below, as well as some personal information. Please follow the directions to create your username and password.     Your access code is: [email or web address]  Expires: 2017  5:41 PM     Your access code will  in 90 days. If you need help or a new code, please call your [place] clinic or [phone].        Care EveryWhere ID     This is your Care EveryWhere ID. This could be used by other organizations to access your [place] medical records  [id number]        Your Vitals Were     Pulse Temperature Height Pulse Oximetry BMI (Body Mass Index)       66 97  F (36.1  C) (Oral) 5' (1.524 m) 98% 22.54 kg/m2        Blood Pressure from Last 3 Encounters:   17 146/74   09/15/17 183/71   09/15/17 139/59    Weight from Last 3 Encounters:   17 115 lb 6.4 oz (52.3 kg)   09/15/17 114 lb (51.7 kg)   16 115 lb 14.4 oz (52.6 kg)              We Performed the Following     CARDIOLOGY PROCEDURE ADULT REFERRAL     UA reflex to Microscopic and Culture     Urine Microscopic        Primary Care Provider Office Phone # Fax #    Theresa Frank [phone] [address] [facility] [address]  [place] [address]        Equal Access to Services     [facility]: Hadii aad ku hadasho Soomaali, waaxda luqadaha, qaybta kaalmada adeegyada, jasmina low hayduke luciano . So [facility] [phone].    ATENCIÓN: Si habla español, tiene a sharma disposición servicios gratuitos de asistencia lingüística. Llame al [phone].    We comply with applicable federal civil rights laws and [place] laws. We do not discriminate on the basis of race, color, national origin, age, disability sex, sexual orientation or gender identity.            Thank you!     Thank you for choosing [facility]  for your care. Our goal is always to provide you with excellent care. " Hearing back from our patients is one way we can continue to improve our services. Please take a few minutes to complete the written survey that you may receive in the mail after your visit with us. Thank you!             Your Updated Medication List - Protect others around you: Learn how to safely use, store and throw away your medicines at www.disposemymeds.org.          This list is accurate as of: 9/19/17  9:49 AM.  Always use your most recent med list.                   Brand Name Dispense Instructions for use Diagnosis    aspirin 81 MG tablet      Take 1 tablet (81 mg) by mouth daily    Hyperlipidemia LDL goal <130       ciprofloxacin 500 MG tablet    CIPRO    10 tablet    Take 1 tablet (500 mg) by mouth 2 times daily for 5 days        simvastatin 20 MG tablet    ZOCOR    90 tablet    TAKE 1 TABLET AT BEDTIME    Hyperlipidemia LDL goal <130       TYLENOL 500 MG tablet   Generic drug:  acetaminophen      Take 500-1,000 mg by mouth every 6 hours as needed for mild pain

## 2017-09-25 DIAGNOSIS — E78.5 HYPERLIPIDEMIA LDL GOAL <130: ICD-10-CM

## 2017-09-25 NOTE — TELEPHONE ENCOUNTER
simvastatin (ZOCOR) 20 MG tablet       Last Written Prescription Date: 3/27/2017  Last Fill Quantity: 90, # refills: 1  Last Office Visit with [facility] or [facility] prescribing provider: 8/17/2016  Next 5 appointments (look out 90 days)     Oct 13, 2017  3:30 PM CDT   [facility]    [address] [id number] [phone]            Nov 06, 2017  2:15 PM CST   Return Visit with Igor Harrington MD   [facility] PHYSICIANS HEART AT [place] ([facility])    [address]  [facility] [id number] [phone]                   Lab Results   Component Value Date    CHOL 154 03/16/2016     Lab Results   Component Value Date    HDL 57 03/16/2016     Lab Results   Component Value Date    LDL 77 03/16/2016     Lab Results   Component Value Date    TRIG 99 03/16/2016     Lab Results   Component Value Date    CHOLHDLRATIO 2.8 08/19/2015

## 2017-09-26 RX ORDER — SIMVASTATIN 20 MG
TABLET ORAL
Qty: 90 TABLET | Refills: 0 | Status: SHIPPED | OUTPATIENT
Start: 2017-09-26 | End: 2018-03-21

## 2017-09-27 ENCOUNTER — HOSPITAL ENCOUNTER (OUTPATIENT)
Dept: CARDIOLOGY | Facility: CLINIC | Age: 82
End: 2017-09-27
Attending: EMERGENCY MEDICINE
Payer: MEDICARE

## 2017-09-27 DIAGNOSIS — I35.1 AORTIC VALVE REGURGITATION, UNSPECIFIED ETIOLOGY: ICD-10-CM

## 2017-09-27 PROCEDURE — 93306 TTE W/DOPPLER COMPLETE: CPT | Mod: 26 | Performed by: INTERNAL MEDICINE

## 2017-10-13 ENCOUNTER — OFFICE VISIT (OUTPATIENT)
Dept: FAMILY MEDICINE | Facility: CLINIC | Age: 82
End: 2017-10-13
Payer: MEDICARE

## 2017-10-13 VITALS
HEART RATE: 66 BPM | SYSTOLIC BLOOD PRESSURE: 148 MMHG | TEMPERATURE: 97.6 F | WEIGHT: 115 LBS | OXYGEN SATURATION: 98 % | HEIGHT: 60 IN | DIASTOLIC BLOOD PRESSURE: 72 MMHG | BODY MASS INDEX: 22.58 KG/M2

## 2017-10-13 DIAGNOSIS — R53.83 FATIGUE, UNSPECIFIED TYPE: ICD-10-CM

## 2017-10-13 DIAGNOSIS — R41.3 MEMORY LOSS: Primary | ICD-10-CM

## 2017-10-13 DIAGNOSIS — I35.1 AORTIC VALVE INSUFFICIENCY, ETIOLOGY OF CARDIAC VALVE DISEASE UNSPECIFIED: ICD-10-CM

## 2017-10-13 PROCEDURE — 99213 OFFICE O/P EST LOW 20 MIN: CPT | Performed by: INTERNAL MEDICINE

## 2017-10-13 NOTE — MR AVS SNAPSHOT
"              After Visit Summary   10/13/2017    Polly De La Torre    MRN: [id number]           Patient Information     Date Of Birth          9/11/1931        Visit Information        Provider Department      10/13/2017 3:30 PM Theresa Frank,  [facility]        Today's Diagnoses     Memory loss    -  1    Fatigue, unspecified type          Care Instructions    Schedule occupational therapy cognition assessment    Start Vitamin B12 for memory at a dose about 1,000 mcg daily    Keep cardiology appointment          Follow-ups after your visit        Additional Services     OCCUPATIONAL THERAPY REFERRAL       *This therapy referral will be filtered to a centralized scheduling office at [facility] and the patient will receive a call to schedule an appointment at a [place] location most convenient for them. *     [facility] provides Occupational Therapy evaluation and treatment and many specialty services across the [place] system.  If requesting a specialty program, please choose from the list below.    If you have not heard from the scheduling office within 2 business days, please call [phone] for all locations, with the exception of [place], please call [phone].     Treatment: Evaluation & Treatment  Special Instructions/Modalities: CPT  Special Programs: Cognition Assessment     Please be aware that coverage of these services is subject to the terms and limitations of your health insurance plan.  Call member services at your health plan with any benefit or coverage questions.      **Note to Provider:  If you are referring outside of [place] for the therapy appointment, please list the name of the location in the \"special instructions\" above, print the referral and give to the patient to schedule the appointment.                  Your next 10 appointments already scheduled     Nov 06, 2017  2:15 PM CST   Return Visit with Igor Wheatley " "MD Juany   [facility] AT [place] ([facility])    [address] [place], [place] [address]  [place] [address] [phone]              Who to contact     If you have questions or need follow up information about today's clinic visit or your schedule please contact [facility] directly at [phone].  Normal or non-critical lab and imaging results will be communicated to you by MyChart, letter or phone within 4 business days after the clinic has received the results. If you do not hear from us within 7 days, please contact the clinic through MyChart or phone. If you have a critical or abnormal lab result, we will notify you by phone as soon as possible.  Submit refill requests through StyleHop or call your pharmacy and they will forward the refill request to us. Please allow 3 business days for your refill to be completed.          Additional Information About Your Visit        Alt[employer] CoharAudicus Information     StyleHop lets you send messages to your doctor, view your test results, renew your prescriptions, schedule appointments and more. To sign up, go to [email or web address] . Click on \"Log in\" on the left side of the screen, which will take you to the Welcome page. Then click on \"Sign up Now\" on the right side of the page.     You will be asked to enter the access code listed below, as well as some personal information. Please follow the directions to create your username and password.     Your access code is: [email or web address]  Expires: 2017  5:41 PM     Your access code will  in 90 days. If you need help or a new code, please call your [facility] or [phone].        Care EveryWhere ID     This is your Care EveryWhere ID. This could be used by other organizations to access your [place] medical records  [id number]        Your Vitals Were     Pulse Temperature Height Pulse Oximetry BMI (Body Mass Index)       66 97.6  F (36.4  C) (Oral) 5' (1.524 m) 98% " 22.46 kg/m2        Blood Pressure from Last 3 Encounters:   10/13/17 148/72   09/19/17 146/74   09/15/17 183/71    Weight from Last 3 Encounters:   10/13/17 115 lb (52.2 kg)   09/19/17 115 lb 6.4 oz (52.3 kg)   09/15/17 114 lb (51.7 kg)              We Performed the Following     OCCUPATIONAL THERAPY REFERRAL        Primary Care Provider Office Phone # Fax #    Theresa Frank,  [phone] [address]  [facility] [address]        Equal Access to Services     [facility]: Hadii aad ku hadasho Soomaali, waaxda luqadaha, qaybta kaalmada adeegyada, waxandre low hayaan aderoberta luciano . So [facility] [phone].    ATENCIÓN: Si habla español, tiene a sharma disposición servicios gratuitos de asistencia lingüística. Llame al [phone].    We comply with applicable federal civil rights laws and [place] laws. We do not discriminate on the basis of race, color, national origin, age, disability, sex, sexual orientation, or gender identity.            Thank you!     Thank you for choosing [facility]  for your care. Our goal is always to provide you with excellent care. Hearing back from our patients is one way we can continue to improve our services. Please take a few minutes to complete the written survey that you may receive in the mail after your visit with us. Thank you!             Your Updated Medication List - Protect others around you: Learn how to safely use, store and throw away your medicines at www.disposemymeds.org.          This list is accurate as of: 10/13/17  4:08 PM.  Always use your most recent med list.                   Brand Name Dispense Instructions for use Diagnosis    aspirin 81 MG tablet      Take 1 tablet (81 mg) by mouth daily    Hyperlipidemia LDL goal <130       simvastatin 20 MG tablet    ZOCOR    90 tablet    TAKE 1 TABLET AT BEDTIME    Hyperlipidemia LDL goal <130       TYLENOL 500 MG tablet   Generic drug:  acetaminophen      Take 500-1,000 mg  by mouth every 6 hours as needed for mild pain

## 2017-10-13 NOTE — PROGRESS NOTES
SUBJECTIVE:   Polly De La Torre is a 86 year old female who presents to clinic today for the following health issues:      Polly is here to f/u on 9/15/17 ER visit, was seen on 9/19/17 by Ester Kelly NP who ordered an echo which was done 9/27/17. She is being seen to f/u on that and will be seen on 11/6/17 by cardiology.    Echo results:  Interpretation Summary  The left ventricle is normal in size.  There is basal left ventricular hypertrophy which is asymmetric, with dynamic LVOT obstruction, consistent with hypertrophic cardiomyopathy.  The visual ejection fraction is estimated at 55-60%.  No regional wall motion abnormalities noted.  There is mild mitral stenosis with a mean gradient of 5 mmHg at HR 84.  The aortic valve has mild to moderate insufficiency.  On [id number] comparison with the prior study, the images are better with this study. The gradient across the LVOT was less today, but may not be reflective of true gradient.    She says she feels absolutely fine. Admits to some fatigue later in the day but she shakes that off and attributes it to age. We discussed that she should keep cardiology apt but there is nothing urgent and I doubt that the LVOT and mild valvular disease contributes to her fatigue. She says that she takes a few naps during the day averaging about 45 minutes. Sleeps well at night. No specific physical complaints.   She does the laundry, cooking, dusting but  does some of the vacuuming. He is quite frail though and has a cane with him today.  She has noticeable memory impairment which we had discussed over a year ago but she has not f/u on this. She either has lack of insight and/or denial into this. Ed welcomes the idea of further investigation into her cognition.        Problem list and histories reviewed & adjusted, as indicated.    ROS:  Detailed as above     OBJECTIVE:     /72  Pulse 66  Temp 97.6  F (36.4  C) (Oral)  Ht 5' (1.524 m)  Wt 115 lb  (52.2 kg)  SpO2 98%  BMI 22.46 kg/m2  Body mass index is 22.46 kg/(m^2).  Alert, pleasant, well dressed, NAD  Seems to either brush off or have lack of insight into cognitive concerns  HRRR with 3/6 systolic murmur over aortic post and faint diastolic murmur in the background  Walks without assist device  Breathing comfortably on room air  No edema    ASSESSMENT/PLAN:       1. Memory loss  Seems most likely r/t dementia. She is quite resistant to any discussion of this but  Ed is clearly worried about her. She agreed for occupational therapy evaluation for functional assessment.  See my note from last year for more details in Aug 2016 when she had MoCA 20/30.  H/o low B12 in the past so suggested she start that as may help energy and cognition to a slight degree  - OCCUPATIONAL THERAPY REFERRAL    2. Fatigue, unspecified type  May be r/t cognitive concerns and aging. Still seems to do a lot in the home and be quite active. She isn't that concerned about it today at all and surely looks to have lots of energy.  Basic labs and CXR in ER unremarkable and she was treated for UTI at that time.    3. Aortic valve insufficiency, etiology of cardiac valve disease unspecified  Has upcoming cardiology appointment with Dr. Harrington      Patient Instructions   Schedule occupational therapy cognition assessment    Start Vitamin B12 for memory at a dose about 1,000 mcg daily    Keep cardiology appointment      Theresa Frank, DO  [facility]

## 2017-10-13 NOTE — PATIENT INSTRUCTIONS
Schedule occupational therapy cognition assessment    Start Vitamin B12 for memory at a dose about 1,000 mcg daily    Keep cardiology appointment

## 2017-11-02 PROBLEM — M85.89 OSTEOPENIA OF MULTIPLE SITES: Status: ACTIVE | Noted: 2017-11-02

## 2017-11-06 ENCOUNTER — OFFICE VISIT (OUTPATIENT)
Dept: CARDIOLOGY | Facility: CLINIC | Age: 82
End: 2017-11-06
Attending: EMERGENCY MEDICINE
Payer: MEDICARE

## 2017-11-06 VITALS
DIASTOLIC BLOOD PRESSURE: 64 MMHG | HEART RATE: 66 BPM | BODY MASS INDEX: 22.97 KG/M2 | WEIGHT: 117 LBS | HEIGHT: 60 IN | SYSTOLIC BLOOD PRESSURE: 124 MMHG

## 2017-11-06 DIAGNOSIS — I35.1 NONRHEUMATIC AORTIC VALVE INSUFFICIENCY: ICD-10-CM

## 2017-11-06 DIAGNOSIS — I35.1 AORTIC VALVE INSUFFICIENCY, ETIOLOGY OF CARDIAC VALVE DISEASE UNSPECIFIED: ICD-10-CM

## 2017-11-06 DIAGNOSIS — E78.5 HYPERLIPIDEMIA LDL GOAL <130: Primary | ICD-10-CM

## 2017-11-06 PROCEDURE — 99214 OFFICE O/P EST MOD 30 MIN: CPT | Performed by: INTERNAL MEDICINE

## 2017-11-06 NOTE — LETTER
11/6/2017    Debbie Pierre MD  [facility]  [address] [place] [address]    RE: Polly De La Torre       Dear Colleague,    I had the pleasure of seeing Polly Daniellejohn in the [facility].    Patient is an 86-year-old woman who I initially met in 2015. She has not followed up with us since that time. She initially presented to us with fatigue and murmur. Echocardiogram findings are noted below. She also underwent a cardiac MRI and more recently had an echocardiogram prior to today's visit. Patient had presented to the emergency room in September 2017. At that point she again reported a fatigue. She was diagnosed with a UTI and treated with antibiotics. Regarding her fatigue this apparently has been a long-standing problem. I had mentioned this in my note in 2015. She states that she is active, she has never had any chest pain, PND orthopnea, or any other cardiac clinic concerns. She maintains her household and does the chores around the house including cooking and cleaning. She also takes care of her  as well. She underwent an echocardiogram which is noted below.    Cardiac MRI:    1.  Small left ventricular cavity size with mild asymmetric septal  hypertrophy and hyperdynamic systolic function with a calculated  ejection fraction of  65 %.  2.  Normal right ventricular size and hyperdynamic systolic function  with a calculated ejection fraction of 78%.   3.  Findings are consistent with a small LV cavity with hyperdynamic  function in the setting of a mildly hypertrophied sigmoid septum  secondary to age . There is not elongation of the mitral leaflets nor  systolic anterior motion of the mitral valve or displacement of the  papillary muscles.   4.  Moderate aortic valve sclerosis with a peak velocity of 2.2 m/s  through the valve, mild to moderate aortic regurgitation based on  visual estimate and regurgitant fraction (stroke volume is low due  to  small cavity size).   5.  On delayed enhancement imaging, there is no abnormal  hyperenhancement to suggest myocardial scar/inflammation/infiltration.     The MRI sequences and imaging planes in this study were tailored for  cardiac imaging and are suboptimal for evaluation of non-cardiac  structures.     FINDINGS     Left ventricle  Cavity size: Small  Wall thickness: Asymmetric mild septal hypertrophy  Global systolic function: Normal with a quantitative LV ejection  fraction of 65%.   Regional wall motion: Normal  Delayed enhancement: Negative     Right ventricle  Cavity size: Normal  Wall thickness: Normal  Global systolic function: Normal with a quantitative RV ejection  fraction of 78%.   Regional wall motion: Normal     Atria  LA size: Normal  RA size: Normal     Valves  Aortic Valve  Valve morphology: Tricuspid with moderate valve sclerosis the peak  velocity through the aortic valve is 2.2m/s  Aortic stenosis: Increased turbulence through the narrowed LVOT but no  evidence of ZAIRA  Aortic regurgitation: Mild to moderate     Velocity encoded flow maps of the aorta  Forward flow volume: 42 mL  Regurgitant volume: 8 mL  Regurgitant fraction: 19 %     Mitral Valve  Mitral stenosis: Restricted anterior leaflet excursion which may be  secondary to the aortic insufficiency  Mitral regurgitation: Mild     Tricuspid Valve   Tricuspid stenosis: None  Tricuspid regurgitation: Trace     Pulmonic Valve  Pulmonic stenosis: None  Pulmonic regurgitation: None     Extracardiac  Aortic root dimension: 35 mm (at the sinuses of Valsalva)  Main PA dimension: 25 mm  Pericardial thickness: Normal  Pericardial effusion:None  Pleural effusion: None     Measurements  LEFT VENTRICULAR VOLUME RESULTS  Body Surface Area:    1.46 m2                   ED volume:            79.32 ml                                 ED volume index:      54.31 ml/m2                              ES volume:            27.56 ml                                  ES volume index:      18.87 ml/m2                              Stroke volume:        51.76 ml                                 Stroke volume index:  35.44 ml/m2                              Cardiac output:       3.88 l/min                               Cardiac output index: 2.66 l/(m2*min)                          Ejection fraction:    65.26 %           LV wall thickness - Anteroseptal: 1.5 cm  LV wall thickness - Inferolateral: 1.0 cm  LV wall thickness - Maximum: 1.5 cm  LV end-diastolic diameter: 3.9 cm  LV end-systolic diameter: 1.6 cm  LA vin-posterior diameter: 2.7 cm     RIGHT VENTRICULAR VOLUME RESULTS  Body Surface Area:    1.46 m2                   ED volume:            59.89 ml                                 ED volume index:      41.00 ml/m2                              ES volume:            13.37 ml                                 ES volume index:      9.15 ml/m2                               Stroke volume:        46.52 ml                                 Stroke volume index:  31.85 ml/m2                              Cardiac output:       3.49 l/min                               Cardiac output index: 2.39 l/(m2*min)                          Ejection fraction:    77.68 %        Sedation and contrast  Sedation used: No  Contrast agent: MultiHance  Volume administered: 8 mL         Echocardiogram:  The left ventricle is normal in size.  There is basal left ventricular hypertrophy which is asymmetric, with dynamic  LVOT obstruction, consistent with hypertrophic cardiomyopathy.  The visual ejection fraction is estimated at 55-60%.  No regional wall motion abnormalities noted.  There is mild mitral stenosis with a mean gradient of 5 mmHg at HR 84.  The aortic valve has mild to moderate insufficiency.  On [id number] comparison with the prior study, the images are better with  this study. The gradient across the LVOT was less today, but may not be  reflective of true gradient.      Blood pressure 124/64,  pulse 66, height 1.524 m (5'), weight 53.1 kg (117 lb), not currently breastfeeding.  GENERAL:  The patient is alert, oriented, well-dressed, well-appearing woman in no apparent distress.   HEENT:  Oropharynx clear, no sinus tenderness.   NECK:  No JVP, no lymphadenopathy, no carotid bruits.   CARDIOVASCULAR:  Regular rate and rhythm with a 2/6 diastolic murmur best heard at the left upper sternal border.   LUNGS:  Coarse but clear.   ABDOMEN:  Soft, nontender, nondistended.   EXTREMITIES:  No clubbing, cyanosis or edema.     Assessment:    1.  Hyperdynamic LV systolic function  2.  Cardiac MRI did not suggest hypertrophic obstructive cardiomyopathy  3.  Hyperlipidemia  4.  Fatigue long-standing    Recommendations    We reviewed patient's clinical findings. She has had long-standing fatigue noted. We have suggested a beta blocker may be helpful given her echocardiogram findings. However the patient is not overtly excited about starting new medications.  I have advised her to continue to maintain appropriate hydration. She appears to be not taking an adequate amount of p.o. intake. I have also advised her as that we are here for scheduled follow-ups if need be. She would prefer to come on an as-needed basis. I given her my card and we would be happy to see her if any issues should arise in the future. Thank you kindly for this consult    Thank you for allowing me to participate in the care of your patient.    Sincerely,     Igor Harrington MD     [facility]    cc:   Theresa Frank, DO  [address] [facility] [address]  [facility] [address]

## 2017-11-06 NOTE — MR AVS SNAPSHOT
"              After Visit Summary   11/6/2017    Polly De La Torre    MRN: [id number]           Patient Information     Date Of Birth          9/11/1931        Visit Information        Provider Department      11/6/2017 2:15 PM Igor Harrington MD [facility]        Today's Diagnoses     Hyperlipidemia LDL goal <130    -  1    Nonrheumatic aortic valve insufficiency        Aortic valve insufficiency, etiology of cardiac valve disease unspecified           Follow-ups after your visit        Who to contact     If you have questions or need follow up information about today's clinic visit or your schedule please contact [facility] directly at [phone].  Normal or non-critical lab and imaging results will be communicated to you by FunCaptchahart, letter or phone within 4 business days after the clinic has received the results. If you do not hear from us within 7 days, please contact the clinic through FunCaptchahart or phone. If you have a critical or abnormal lab result, we will notify you by phone as soon as possible.  Submit refill requests through mobiManage or call your pharmacy and they will forward the refill request to us. Please allow 3 business days for your refill to be completed.          Additional Information About Your Visit        MyChart Information     mobiManage lets you send messages to your doctor, view your test results, renew your prescriptions, schedule appointments and more. To sign up, go to [email or web address] . Click on \"Log in\" on the left side of the screen, which will take you to the Welcome page. Then click on \"Sign up Now\" on the right side of the page.     You will be asked to enter the access code listed below, as well as some personal information. Please follow the directions to create your username and password.     Your access code is: [email or web address]  Expires: 12/14/2017  4:41 PM     Your access code will "  in 90 days. If you need help or a new code, please call your [place] clinic or [phone].        Care EveryWhere ID     This is your Care EveryWhere ID. This could be used by other organizations to access your [place] medical records  [id number]        Your Vitals Were     Pulse Height BMI (Body Mass Index)             66 1.524 m (5') 22.85 kg/m2          Blood Pressure from Last 3 Encounters:   17 124/64   10/13/17 148/72   17 146/74    Weight from Last 3 Encounters:   17 53.1 kg (117 lb)   10/13/17 52.2 kg (115 lb)   17 52.3 kg (115 lb 6.4 oz)              We Performed the Following     Follow-Up with Cardiologist        Primary Care Provider Office Phone # Fax #    Theresa FrankDO [phone] [address]  [place] [address]        Equal Access to Services     TWIN CHEATHAM : Hadii aad ku hadasho Soomaali, waaxda luqadaha, qaybta kaalmada adeegyada, waxay idiin hayaan adeeg kharaanita la'duke . So [facility] [phone].    ATENCIÓN: Si habla español, tiene a sharma disposición servicios gratuitos de asistencia lingüística. Llame al [phone].    We comply with applicable federal civil rights laws and [place] laws. We do not discriminate on the basis of race, color, national origin, age, disability, sex, sexual orientation, or gender identity.            Thank you!     Thank you for choosing [facility]  for your care. Our goal is always to provide you with excellent care. Hearing back from our patients is one way we can continue to improve our services. Please take a few minutes to complete the written survey that you may receive in the mail after your visit with us. Thank you!             Your Updated Medication List - Protect others around you: Learn how to safely use, store and throw away your medicines at www.disposemymeds.org.          This list is accurate as of: 17  2:33 PM.  Always use your most  recent med list.                   Brand Name Dispense Instructions for use Diagnosis    aspirin 81 MG tablet      Take 1 tablet (81 mg) by mouth daily    Hyperlipidemia LDL goal <130       B-12 PO      Take 1,000 mcg by mouth daily        simvastatin 20 MG tablet    ZOCOR    90 tablet    TAKE 1 TABLET AT BEDTIME    Hyperlipidemia LDL goal <130       TYLENOL 500 MG tablet   Generic drug:  acetaminophen      Take 500-1,000 mg by mouth every 6 hours as needed for mild pain

## 2017-11-06 NOTE — PROGRESS NOTES
Cardiology    Patient is an 86-year-old woman who I initially met in 2015. She has not followed up with us since that time. She initially presented to us with fatigue and murmur. Echocardiogram findings are noted below. She also underwent a cardiac MRI and more recently had an echocardiogram prior to today's visit. Patient had presented to the emergency room in September 2017. At that point she again reported a fatigue. She was diagnosed with a UTI and treated with antibiotics. Regarding her fatigue this apparently has been a long-standing problem. I had mentioned this in my note in 2015. She states that she is active, she has never had any chest pain, PND orthopnea, or any other cardiac clinic concerns. She maintains her household and does the chores around the house including cooking and cleaning. She also takes care of her  as well. She underwent an echocardiogram which is noted below.    Cardiac MRI:    1.  Small left ventricular cavity size with mild asymmetric septal  hypertrophy and hyperdynamic systolic function with a calculated  ejection fraction of  65 %.  2.  Normal right ventricular size and hyperdynamic systolic function  with a calculated ejection fraction of 78%.   3.  Findings are consistent with a small LV cavity with hyperdynamic  function in the setting of a mildly hypertrophied sigmoid septum  secondary to age . There is not elongation of the mitral leaflets nor  systolic anterior motion of the mitral valve or displacement of the  papillary muscles.   4.  Moderate aortic valve sclerosis with a peak velocity of 2.2 m/s  through the valve, mild to moderate aortic regurgitation based on  visual estimate and regurgitant fraction (stroke volume is low due to  small cavity size).   5.  On delayed enhancement imaging, there is no abnormal  hyperenhancement to suggest myocardial scar/inflammation/infiltration.     The MRI sequences and imaging planes in this study were tailored for  cardiac  imaging and are suboptimal for evaluation of non-cardiac  structures.     FINDINGS     Left ventricle  Cavity size: Small  Wall thickness: Asymmetric mild septal hypertrophy  Global systolic function: Normal with a quantitative LV ejection  fraction of 65%.   Regional wall motion: Normal  Delayed enhancement: Negative     Right ventricle  Cavity size: Normal  Wall thickness: Normal  Global systolic function: Normal with a quantitative RV ejection  fraction of 78%.   Regional wall motion: Normal     Atria  LA size: Normal  RA size: Normal     Valves  Aortic Valve  Valve morphology: Tricuspid with moderate valve sclerosis the peak  velocity through the aortic valve is 2.2m/s  Aortic stenosis: Increased turbulence through the narrowed LVOT but no  evidence of ZAIRA  Aortic regurgitation: Mild to moderate     Velocity encoded flow maps of the aorta  Forward flow volume: 42 mL  Regurgitant volume: 8 mL  Regurgitant fraction: 19 %     Mitral Valve  Mitral stenosis: Restricted anterior leaflet excursion which may be  secondary to the aortic insufficiency  Mitral regurgitation: Mild     Tricuspid Valve   Tricuspid stenosis: None  Tricuspid regurgitation: Trace     Pulmonic Valve  Pulmonic stenosis: None  Pulmonic regurgitation: None     Extracardiac  Aortic root dimension: 35 mm (at the sinuses of Valsalva)  Main PA dimension: 25 mm  Pericardial thickness: Normal  Pericardial effusion:None  Pleural effusion: None     Measurements  LEFT VENTRICULAR VOLUME RESULTS  Body Surface Area:    1.46 m2                   ED volume:            79.32 ml                                 ED volume index:      54.31 ml/m2                              ES volume:            27.56 ml                                 ES volume index:      18.87 ml/m2                              Stroke volume:        51.76 ml                                 Stroke volume index:  35.44 ml/m2                              Cardiac output:       3.88 l/min                                Cardiac output index: 2.66 l/(m2*min)                          Ejection fraction:    65.26 %           LV wall thickness - Anteroseptal: 1.5 cm  LV wall thickness - Inferolateral: 1.0 cm  LV wall thickness - Maximum: 1.5 cm  LV end-diastolic diameter: 3.9 cm  LV end-systolic diameter: 1.6 cm  LA vin-posterior diameter: 2.7 cm     RIGHT VENTRICULAR VOLUME RESULTS  Body Surface Area:    1.46 m2                   ED volume:            59.89 ml                                 ED volume index:      41.00 ml/m2                              ES volume:            13.37 ml                                 ES volume index:      9.15 ml/m2                               Stroke volume:        46.52 ml                                 Stroke volume index:  31.85 ml/m2                              Cardiac output:       3.49 l/min                               Cardiac output index: 2.39 l/(m2*min)                          Ejection fraction:    77.68 %        Sedation and contrast  Sedation used: No  Contrast agent: MultiHance  Volume administered: 8 mL         Echocardiogram:  The left ventricle is normal in size.  There is basal left ventricular hypertrophy which is asymmetric, with dynamic  LVOT obstruction, consistent with hypertrophic cardiomyopathy.  The visual ejection fraction is estimated at 55-60%.  No regional wall motion abnormalities noted.  There is mild mitral stenosis with a mean gradient of 5 mmHg at HR 84.  The aortic valve has mild to moderate insufficiency.  On [id number] comparison with the prior study, the images are better with  this study. The gradient across the LVOT was less today, but may not be  reflective of true gradient.      Blood pressure 124/64, pulse 66, height 1.524 m (5'), weight 53.1 kg (117 lb), not currently breastfeeding.  GENERAL:  The patient is alert, oriented, well-dressed, well-appearing woman in no apparent distress.   HEENT:  Oropharynx clear, no sinus tenderness.    NECK:  No JVP, no lymphadenopathy, no carotid bruits.   CARDIOVASCULAR:  Regular rate and rhythm with a 2/6 diastolic murmur best heard at the left upper sternal border.   LUNGS:  Coarse but clear.   ABDOMEN:  Soft, nontender, nondistended.   EXTREMITIES:  No clubbing, cyanosis or edema.     Assessment:    1.  Hyperdynamic LV systolic function  2.  Cardiac MRI did not suggest hypertrophic obstructive cardiomyopathy  3.  Hyperlipidemia  4.  Fatigue long-standing    Recommendations    We reviewed patient's clinical findings. She has had long-standing fatigue noted. We have suggested a beta blocker may be helpful given her echocardiogram findings. However the patient is not overtly excited about starting new medications.  I have advised her to continue to maintain appropriate hydration. She appears to be not taking an adequate amount of p.o. intake. I have also advised her as that we are here for scheduled follow-ups if need be. She would prefer to come on an as-needed basis. I given her my card and we would be happy to see her if any issues should arise in the future. Thank you kindly for this consult    Igor Harrington MD

## 2018-01-19 ENCOUNTER — OFFICE VISIT (OUTPATIENT)
Dept: FAMILY MEDICINE | Facility: CLINIC | Age: 83
End: 2018-01-19
Payer: MEDICARE

## 2018-01-19 VITALS
HEART RATE: 78 BPM | TEMPERATURE: 97.6 F | SYSTOLIC BLOOD PRESSURE: 145 MMHG | DIASTOLIC BLOOD PRESSURE: 84 MMHG | OXYGEN SATURATION: 96 % | BODY MASS INDEX: 23.09 KG/M2 | HEIGHT: 60 IN | WEIGHT: 117.6 LBS

## 2018-01-19 DIAGNOSIS — R41.3 MEMORY LOSS: Chronic | ICD-10-CM

## 2018-01-19 DIAGNOSIS — L82.1 SEBORRHEIC KERATOSIS: Primary | ICD-10-CM

## 2018-01-19 PROCEDURE — 99213 OFFICE O/P EST LOW 20 MIN: CPT | Performed by: INTERNAL MEDICINE

## 2018-01-19 NOTE — PATIENT INSTRUCTIONS
"You have two benign (non-cancerous) spots under each breast called \"Seborrheic Keratoses\" - you don't need to worry about these or do anything different - they often come with age    If you wish to do more investigation with memory loss have your  let us know  "

## 2018-01-19 NOTE — NURSING NOTE
Chief Complaint   Patient presents with     Sore     under breast       Initial /84 (BP Location: Right arm, Patient Position: Chair, Cuff Size: Adult Regular)  Pulse 121  Temp 97.6  F (36.4  C) (Oral)  Ht 5' (1.524 m)  Wt 117 lb 9.6 oz (53.3 kg)  SpO2 96%  Breastfeeding? No  BMI 22.97 kg/m2 Estimated body mass index is 22.97 kg/(m^2) as calculated from the following:    Height as of this encounter: 5' (1.524 m).    Weight as of this encounter: 117 lb 9.6 oz (53.3 kg).  Medication Reconciliation: complete.  Diana Ocampo, CMA

## 2018-01-19 NOTE — MR AVS SNAPSHOT
"              After Visit Summary   1/19/2018    Polly De La Torre    MRN: [id number]           Patient Information     Date Of Birth          9/11/1931        Visit Information        Provider Department      1/19/2018 11:30 AM Theresa Frank,  [facility]        Today's Diagnoses     Seborrheic keratosis    -  1      Care Instructions    You have two benign (non-cancerous) spots under each breast called \"Seborrheic Keratosis\" - you don't need to worry about these or do anything different - they often come with age    If you wish to do more investigation with memory loss have your  let us know          Follow-ups after your visit        Your next 10 appointments already scheduled     Jan 19, 2018 11:30 AM CST   SHORT with Theresa Frank DO   [facility] ([facility]    [address] [facility] [address] [phone]              Who to contact     If you have questions or need follow up information about today's clinic visit or your schedule please contact [facility] directly at [phone].  Normal or non-critical lab and imaging results will be communicated to you by Swapseehart, letter or phone within 4 business days after the clinic has received the results. If you do not hear from us within 7 days, please contact the clinic through Swapseehart or phone. If you have a critical or abnormal lab result, we will notify you by phone as soon as possible.  Submit refill requests through [employer] or call your pharmacy and they will forward the refill request to us. Please allow 3 business days for your refill to be completed.          Additional Information About Your Visit        MyChart Information     [employer] lets you send messages to your doctor, view your test results, renew your prescriptions, schedule appointments and more. To sign up, go to [email or web address] . Click on \"Log in\" on the left side of the screen, which will take you to the " "Welcome page. Then click on \"Sign up Now\" on the right side of the page.     You will be asked to enter the access code listed below, as well as some personal information. Please follow the directions to create your username and password.     Your access code is: [email or web address]  Expires: 2018 11:26 AM     Your access code will  in 90 days. If you need help or a new code, please call your [place] clinic or [phone].        Care EveryWhere ID     This is your Care EveryWhere ID. This could be used by other organizations to access your [place] medical records  [id number]        Your Vitals Were     Pulse Temperature Height Pulse Oximetry Breastfeeding? BMI (Body Mass Index)    78 97.6  F (36.4  C) (Oral) 5' (1.524 m) 96% No 22.97 kg/m2       Blood Pressure from Last 3 Encounters:   18 145/84   17 124/64   10/13/17 148/72    Weight from Last 3 Encounters:   18 117 lb 9.6 oz (53.3 kg)   17 117 lb (53.1 kg)   10/13/17 115 lb (52.2 kg)              Today, you had the following     No orders found for display       Primary Care Provider Office Phone # Fax #    Theresa FrankDO [phone] [address]  [place] [address]        Equal Access to Services     [facility] AH: Hadii robert humphreyo Sokai, waaxda luqadaha, qaybta kaalmada aderobertayada, jasmina luciano . So [facility] [phone].    ATENCIÓN: Si habla español, tiene a sharma disposición servicios gratuitos de asistencia lingüística. Llame al [phone].    We comply with applicable federal civil rights laws and [place] laws. We do not discriminate on the basis of race, color, national origin, age, disability, sex, sexual orientation, or gender identity.            Thank you!     Thank you for choosing [facility]  for your care. Our goal is always to provide you with excellent care. Hearing back from our patients is one way we can continue to improve our " services. Please take a few minutes to complete the written survey that you may receive in the mail after your visit with us. Thank you!             Your Updated Medication List - Protect others around you: Learn how to safely use, store and throw away your medicines at www.disposemymeds.org.          This list is accurate as of: 1/19/18 11:26 AM.  Always use your most recent med list.                   Brand Name Dispense Instructions for use Diagnosis    aspirin 81 MG tablet      Take 1 tablet (81 mg) by mouth daily    Hyperlipidemia LDL goal <130       B-12 PO      Take 1,000 mcg by mouth daily        simvastatin 20 MG tablet    ZOCOR    90 tablet    TAKE 1 TABLET AT BEDTIME    Hyperlipidemia LDL goal <130       TYLENOL 500 MG tablet   Generic drug:  acetaminophen      Take 500-1,000 mg by mouth every 6 hours as needed for mild pain

## 2018-01-19 NOTE — PROGRESS NOTES
"  SUBJECTIVE:   Polly De La Torre is a 86 year old female who presents to clinic today for the following health issues:    Polly is here today for breast \"sores\" noted about a month ago.    Of note, she has not scheduled occupational therapy cognitive assessment since our visit in October when we discussed this.  is concerned as well about her cognition. She did f/u with cardiology who suggested that she stay hydrated and nourished and can f/u PRN.      Problem list and histories reviewed & adjusted, as indicated.    ROS:  Detailed as above     OBJECTIVE:     /84 (BP Location: Right arm, Patient Position: Chair, Cuff Size: Adult Regular)  Pulse 78  Temp 97.6  F (36.4  C) (Oral)  Ht 5' (1.524 m)  Wt 117 lb 9.6 oz (53.3 kg)  SpO2 96%  Breastfeeding? No  BMI 22.97 kg/m2  Body mass index is 22.97 kg/(m^2).  Alert, pleasant, NAD  Nicely dressed  Doesn't remember seeing me in October without prompts  Admits to memory loss but \"I didn't want to go to that class\" [OT]  Denies depression or sadness re: memory loss  HRRR with 2/6 systolic murmur  Normal CBE without axillary lymphadenopathy  Bilateral benign appearing SKs lower breasts about 6 o'clock position (these are the lesions she was concerned about)  A few other scattered SKs on body  Wt Readings from Last 4 Encounters:   01/19/18 117 lb 9.6 oz (53.3 kg)   11/06/17 117 lb (53.1 kg)   10/13/17 115 lb (52.2 kg)   09/19/17 115 lb 6.4 oz (52.3 kg)     ASSESSMENT/PLAN:     1. Seborrheic keratosis  Reassured     2. Memory loss   drove her today and I saw him in the waiting room; he watches over her carefully  AVS given to her with instructions below    Patient Instructions   You have two benign (non-cancerous) spots under each breast called \"Seborrheic Keratoses\" - you don't need to worry about these or do anything different - they often come with age    If you wish to do more investigation with memory loss have your  let us " know    MDM: 15 minutes spent with patient, over 50% time counseling, coordinating care and explaining about nature of the patient's conditions.    Theresa Frank, [facility]

## 2018-03-19 ENCOUNTER — TELEPHONE (OUTPATIENT)
Dept: FAMILY MEDICINE | Facility: CLINIC | Age: 83
End: 2018-03-19

## 2018-03-20 NOTE — TELEPHONE ENCOUNTER
Polly's  Neal calling requesting a message be sent to  asking that she be fit into the schedule for right leg pain. Neal states this is from an injury from a few years ago but it is very bothersome. Mitulreno states that Polly believes  should take a look at this. Please call [phone] to advise, OK to LM thank You.    Bouchra Edwards, [facility]  Diabetes and Nutrition Scheduling

## 2018-03-20 NOTE — TELEPHONE ENCOUNTER
2:30 Wednesday is fine. Please also advise  to write down any other concerns he has for her and bring to visit. Polly has memory loss and I know it is hard for  to communicate his concerns in front of her.

## 2018-03-21 ENCOUNTER — OFFICE VISIT (OUTPATIENT)
Dept: FAMILY MEDICINE | Facility: CLINIC | Age: 83
End: 2018-03-21
Payer: MEDICARE

## 2018-03-21 VITALS
WEIGHT: 114 LBS | OXYGEN SATURATION: 99 % | HEIGHT: 60 IN | DIASTOLIC BLOOD PRESSURE: 65 MMHG | TEMPERATURE: 97.1 F | SYSTOLIC BLOOD PRESSURE: 142 MMHG | HEART RATE: 75 BPM | BODY MASS INDEX: 22.38 KG/M2

## 2018-03-21 DIAGNOSIS — F03.90 DEMENTIA WITHOUT BEHAVIORAL DISTURBANCE, UNSPECIFIED DEMENTIA TYPE: Chronic | ICD-10-CM

## 2018-03-21 DIAGNOSIS — E78.5 HYPERLIPIDEMIA LDL GOAL <130: ICD-10-CM

## 2018-03-21 DIAGNOSIS — R29.898 RIGHT LEG WEAKNESS: Primary | ICD-10-CM

## 2018-03-21 PROCEDURE — 99213 OFFICE O/P EST LOW 20 MIN: CPT | Performed by: INTERNAL MEDICINE

## 2018-03-21 RX ORDER — SIMVASTATIN 20 MG
20 TABLET ORAL AT BEDTIME
Qty: 90 TABLET | Refills: 3 | Status: SHIPPED | OUTPATIENT
Start: 2018-03-21 | End: 2019-04-25

## 2018-03-21 NOTE — PROGRESS NOTES
SUBJECTIVE:   Polly De La Torre is a 86 year old female who presents to clinic today for the following health issues:      Musculoskeletal problem/pain      Duration: x 1 week ago    Description  Location: right leg    Intensity:  moderate    Accompanying signs and symptoms: When patient gets up she feels as if her right leg is going to give out on her.    History  Previous similar problem: no   Previous evaluation:  none    Precipitating or alleviating factors:  Trauma or overuse: YES- fall x 5-6 years ago when she was running around in her son's yard and didn't realize he put in a new bush around the corner, however, no injuries then so I told her this is likely unrelated  Aggravating factors include: standing    Therapies tried and outcome: being very careful when getting up.    She has not fallen or had trauma that she remembers recently. No pain at all. She loves to be active in her home. She just is surprised that her right thigh feels a bit weak. No numbness/tingling and no other extremity symptoms.   I spoke with  separately briefly (authorization to discuss PHI signed today) and he has nothing specific to add otherwise. He says he tries to encourage her to rest but she just keeps going. He also says that she officially no longer drives d/t her cognitive impairment that is most certainly dementia.    Wt Readings from Last 4 Encounters:   03/21/18 114 lb (51.7 kg)   01/19/18 117 lb 9.6 oz (53.3 kg)   11/06/17 117 lb (53.1 kg)   10/13/17 115 lb (52.2 kg)         Problem list and histories reviewed & adjusted, as indicated.    ROS:  Detailed as above     OBJECTIVE:     /65 (BP Location: Right arm, Patient Position: Sitting, Cuff Size: Adult Regular)  Pulse 75  Temp 97.1  F (36.2  C) (Oral)  Ht 5' (1.524 m)  Wt 114 lb (51.7 kg)  SpO2 99%  Breastfeeding? No  BMI 22.26 kg/m2  Body mass index is 22.26 kg/(m^2).  Alert, pleasant, talkative, NAD  No icterus  HRRR with 2/6 systolic  murmur  No edema  No rash on back/leg  Normal hip and knee ROM without pain and negative straight leg raise  Normal strength bilateral LE and sensation with 2+ L4 reflexes bilaterally  Negative pronator drift  Normal speech and gait  She is just a bit cautious with standing but then once up she moves very well with rapid gait      ASSESSMENT/PLAN:       1. Right leg (subjective) weakness   says she is taking B12 (h/o low levels in the past) and he has tried to keep her down and rest with her recent leg symptoms but she just keeps going which is a good sign that she isn't truly that bothered by it (I reviewed AVS with him as Polly told me to ask him as she didn't know what meds she is taking)  Its subjective weakness when first standing up - hopefully just a muscle spasm - no reproducible weakness on exam or abnormal findings - she is quite robust    2. Dementia   says she no longer drives and authorization to discuss PHI signed - he monitors her very closely      Patient Instructions   I recommend that you take vitamin B12 at a dose of about 1,000 mcg daily (over-the-counter) which is important for nerves and brain function    Hopefully your leg gets better on its own - right now your muscle strength is good and so is the sensation and reflexes and you don't have any pain    If your leg doesn't get better then let me know and I can refer you to a physical therapist    MDM: >15 minutes spent with patient, over 50% time counseling, coordinating care and explaining about nature of the patient's conditions.    Theresa Frank,   [facility]

## 2018-03-21 NOTE — NURSING NOTE
Chief Complaint   Patient presents with     Musculoskeletal Problem       Initial /65 (BP Location: Right arm, Patient Position: Sitting, Cuff Size: Adult Regular)  Pulse 75  Temp 97.1  F (36.2  C) (Oral)  Ht 5' (1.524 m)  Wt 114 lb (51.7 kg)  SpO2 99%  Breastfeeding? No  BMI 22.26 kg/m2 Estimated body mass index is 22.26 kg/(m^2) as calculated from the following:    Height as of this encounter: 5' (1.524 m).    Weight as of this encounter: 114 lb (51.7 kg).  Medication Reconciliation: complete   Cass Chadron- CMA

## 2018-03-21 NOTE — PATIENT INSTRUCTIONS
I recommend that you take vitamin B12 at a dose of about 1,000 mcg daily (over-the-counter) which is important for nerves and brain function    Hopefully your leg gets better on its own - right now your muscle strength is good and so is the sensation and reflexes and you don't have any pain    If your leg doesn't get better then let me know and I can refer you to a physical therapist

## 2018-03-21 NOTE — MR AVS SNAPSHOT
After Visit Summary   3/21/2018    Polly De La Torre    MRN: [id number]           Patient Information     Date Of Birth          9/11/1931        Visit Information        Provider Department      3/21/2018 2:30 PM Theresa Frank DO [facility]        Care Instructions    I recommend that you take vitamin B12 at a dose of about 1,000 mcg daily (over-the-counter) which is important for nerves and brain function    Hopefully your leg gets better on its own - right now your muscle strength is good and so is the sensation and reflexes and you don't have any pain    If your leg doesn't get better then let me know and I can refer you to a physical therapist          Follow-ups after your visit        Your next 10 appointments already scheduled     Mar 21, 2018  2:30 PM CDT   [facility]    [address] [facility] [id number] [phone]           Bring a current list of meds and any records pertaining to this visit. For Physicals, please bring immunization records and any forms needing to be filled out. Please arrive 10 minutes early to complete paperwork.              Who to contact     If you have questions or need follow up information about today's clinic visit or your schedule please contact [facility] directly at [phone].  Normal or non-critical lab and imaging results will be communicated to you by MyChart, letter or phone within 4 business days after the clinic has received the results. If you do not hear from us within 7 days, please contact the clinic through MyChart or phone. If you have a critical or abnormal lab result, we will notify you by phone as soon as possible.  Submit refill requests through [employer] or call your pharmacy and they will forward the refill request to us. Please allow 3 business days for your refill to be completed.          Additional Information About  "Your Visit        MyChart Information     [employer] lets you send messages to your doctor, view your test results, renew your prescriptions, schedule appointments and more. To sign up, go to [email or web address] . Click on \"Log in\" on the left side of the screen, which will take you to the Welcome page. Then click on \"Sign up Now\" on the right side of the page.     You will be asked to enter the access code listed below, as well as some personal information. Please follow the directions to create your username and password.     Your access code is: [email or web address]  Expires: 2018 12:26 PM     Your access code will  in 90 days. If you need help or a new code, please call your [place] clinic or [phone].        Care EveryWhere ID     This is your Care EveryWhere ID. This could be used by other organizations to access your [place] medical records  [id number]        Your Vitals Were     Pulse Temperature Height Pulse Oximetry Breastfeeding? BMI (Body Mass Index)    75 97.1  F (36.2  C) (Oral) 5' (1.524 m) 99% No 22.26 kg/m2       Blood Pressure from Last 3 Encounters:   18 142/65   18 145/84   17 124/64    Weight from Last 3 Encounters:   18 114 lb (51.7 kg)   18 117 lb 9.6 oz (53.3 kg)   17 117 lb (53.1 kg)              Today, you had the following     No orders found for display       Primary Care Provider Office Phone # Fax #    Theresa Renteria DO [facility] [phone] [address] [facility] [address]  [place] [address]        Equal Access to Services     HOWIE CHEATHAM : Yamilet Restrepo, hunter vu, sudhakar kaalmada meme, jasmina amos. So [facility] [place] [phone].    ATENCIÓN: Si habla español, tiene a sharma disposición servicios gratuitos de asistencia lingüística. Llame al [phone].    We comply with applicable federal civil rights laws and [place] laws. We do not discriminate on the basis of race, color, national " origin, age, disability, sex, sexual orientation, or gender identity.            Thank you!     Thank you for choosing [facility]  for your care. Our goal is always to provide you with excellent care. Hearing back from our patients is one way we can continue to improve our services. Please take a few minutes to complete the written survey that you may receive in the mail after your visit with us. Thank you!             Your Updated Medication List - Protect others around you: Learn how to safely use, store and throw away your medicines at www.disposemymeds.org.          This list is accurate as of 3/21/18  2:13 PM.  Always use your most recent med list.                   Brand Name Dispense Instructions for use Diagnosis    aspirin 81 MG tablet      Take 1 tablet (81 mg) by mouth daily    Hyperlipidemia LDL goal <130       B-12 PO      Take 1,000 mcg by mouth daily        simvastatin 20 MG tablet    ZOCOR    90 tablet    TAKE 1 TABLET AT BEDTIME    Hyperlipidemia LDL goal <130       TYLENOL 500 MG tablet   Generic drug:  acetaminophen      Take 500-1,000 mg by mouth every 6 hours as needed for mild pain

## 2018-05-14 ENCOUNTER — TELEPHONE (OUTPATIENT)
Dept: FAMILY MEDICINE | Facility: CLINIC | Age: 83
End: 2018-05-14

## 2018-05-14 NOTE — TELEPHONE ENCOUNTER
Spoke with patient   Scheduled for next week   Pt answered,  unavailable to speak    Will need to recall and ask for Ed.      Rin WILD RN

## 2018-05-14 NOTE — TELEPHONE ENCOUNTER
Reason for call:  Patient reporting a symptom    Symptom or request: The patient has been feeling weak and Tired   Even when she wakes up in the morning    Duration (how long have symptoms been present): for the past 6 months    Have you been treated for this before? Yes    Additional comments: Wants to see Dr Frank for this  Either this week or next week if possible    Phone Number patient can be reached at:  Home number on file [phone] (home)    Best Time:  anytime    Can we leave a detailed message on this number:  YES    Call taken on 5/14/2018 at 2:08 PM by Gisselle Méndez

## 2018-05-14 NOTE — TELEPHONE ENCOUNTER
Ok for office visit next week - ok for block - this is a chronic complaint so not urgent and we've discussed it before. She often minimizes it when she actually does come into clinic. D/t her dementia its a challenge to get a history but  always drives her. Authorization to discuss PHI is on file. Please ask Ed if there are any other concerns he has in case he doesn't feel comfortable bringing it up at the time of office visit.

## 2018-05-14 NOTE — TELEPHONE ENCOUNTER
"Spoke with Polly   Reports feeling fatigued \"all the time\"   Does not feel dizzy/lightheaded or like she will pass out   Just feels tired all the time   Denies weakness   No bleeding concerns - not on any blood thinners   Eats healthy - lots of vitamins in her diet but doesn't take a multivitamin     Pt is requesting OV this week or next. Does not want to see any other provider than PCP.     PCP please advise - are you able to fit pt in sooner than first available? (same day spot?)    Thank you,  Rin WILD RN    "

## 2018-05-16 NOTE — TELEPHONE ENCOUNTER
TAVARES Frank:   Spoke with Ed.   States he has no additional concerns he'd like to discuss  Pt will be coming in 5/23/18 at 9:30am    Rin WILD RN

## 2018-05-23 ENCOUNTER — OFFICE VISIT (OUTPATIENT)
Dept: FAMILY MEDICINE | Facility: CLINIC | Age: 83
End: 2018-05-23
Payer: MEDICARE

## 2018-05-23 VITALS
TEMPERATURE: 97 F | HEIGHT: 60 IN | HEART RATE: 75 BPM | WEIGHT: 115 LBS | DIASTOLIC BLOOD PRESSURE: 69 MMHG | SYSTOLIC BLOOD PRESSURE: 128 MMHG | OXYGEN SATURATION: 97 % | BODY MASS INDEX: 22.58 KG/M2

## 2018-05-23 DIAGNOSIS — R53.83 FATIGUE, UNSPECIFIED TYPE: Primary | ICD-10-CM

## 2018-05-23 DIAGNOSIS — E78.5 HYPERLIPIDEMIA LDL GOAL <130: ICD-10-CM

## 2018-05-23 DIAGNOSIS — I35.1 AORTIC VALVE INSUFFICIENCY, ETIOLOGY OF CARDIAC VALVE DISEASE UNSPECIFIED: ICD-10-CM

## 2018-05-23 DIAGNOSIS — E53.8 VITAMIN B12 DEFICIENCY (NON ANEMIC): ICD-10-CM

## 2018-05-23 LAB
ALBUMIN SERPL-MCNC: 3.8 G/DL (ref 3.4–5)
ALP SERPL-CCNC: 56 U/L (ref 40–150)
ALT SERPL W P-5'-P-CCNC: 14 U/L (ref 0–50)
ANION GAP SERPL CALCULATED.3IONS-SCNC: 7 MMOL/L (ref 3–14)
AST SERPL W P-5'-P-CCNC: 9 U/L (ref 0–45)
BILIRUB SERPL-MCNC: 0.5 MG/DL (ref 0.2–1.3)
BUN SERPL-MCNC: 14 MG/DL (ref 7–30)
CALCIUM SERPL-MCNC: 9.2 MG/DL (ref 8.5–10.1)
CHLORIDE SERPL-SCNC: 104 MMOL/L (ref 94–109)
CHOLEST SERPL-MCNC: 162 MG/DL
CO2 SERPL-SCNC: 28 MMOL/L (ref 20–32)
CREAT SERPL-MCNC: 0.9 MG/DL (ref 0.52–1.04)
ERYTHROCYTE [DISTWIDTH] IN BLOOD BY AUTOMATED COUNT: 13.7 % (ref 10–15)
GFR SERPL CREATININE-BSD FRML MDRD: 60 ML/MIN/1.7M2
GLUCOSE SERPL-MCNC: 94 MG/DL (ref 70–99)
HCT VFR BLD AUTO: 52.2 % (ref 35–47)
HDLC SERPL-MCNC: 62 MG/DL
HGB BLD-MCNC: 16.8 G/DL (ref 11.7–15.7)
LDLC SERPL CALC-MCNC: 72 MG/DL
MCH RBC QN AUTO: 32.2 PG (ref 26.5–33)
MCHC RBC AUTO-ENTMCNC: 32.2 G/DL (ref 31.5–36.5)
MCV RBC AUTO: 100 FL (ref 78–100)
NONHDLC SERPL-MCNC: 100 MG/DL
PLATELET # BLD AUTO: 237 10E9/L (ref 150–450)
POTASSIUM SERPL-SCNC: 5.3 MMOL/L (ref 3.4–5.3)
PROT SERPL-MCNC: 7.9 G/DL (ref 6.8–8.8)
RBC # BLD AUTO: 5.21 10E12/L (ref 3.8–5.2)
SODIUM SERPL-SCNC: 139 MMOL/L (ref 133–144)
TRIGL SERPL-MCNC: 140 MG/DL
VIT B12 SERPL-MCNC: 1984 PG/ML (ref 193–986)
WBC # BLD AUTO: 8 10E9/L (ref 4–11)

## 2018-05-23 PROCEDURE — 85027 COMPLETE CBC AUTOMATED: CPT | Performed by: INTERNAL MEDICINE

## 2018-05-23 PROCEDURE — 82607 VITAMIN B-12: CPT | Performed by: INTERNAL MEDICINE

## 2018-05-23 PROCEDURE — 99214 OFFICE O/P EST MOD 30 MIN: CPT | Performed by: INTERNAL MEDICINE

## 2018-05-23 PROCEDURE — 80053 COMPREHEN METABOLIC PANEL: CPT | Performed by: INTERNAL MEDICINE

## 2018-05-23 PROCEDURE — 36415 COLL VENOUS BLD VENIPUNCTURE: CPT | Performed by: INTERNAL MEDICINE

## 2018-05-23 PROCEDURE — 80061 LIPID PANEL: CPT | Performed by: INTERNAL MEDICINE

## 2018-05-23 NOTE — PROGRESS NOTES
"  SUBJECTIVE:   Polly De La Torre is a 86 year old female who presents to clinic today for the following health issues:    Polly's  is in the waiting room today and she made this appointment b/c she feels she is more tired over the past several months. Of note, she has brought this up before but no red flag symptoms.  Sleeps well for 10 hours and  notices no apnea or snoring from her recollection. She doesn't feel well rested in AM.  Her routine is that she gets up, has coffee and reads paper then showers and goes out to [employer] with her  at [employer]. When gets home watches TV BUT doesn't fall asleep. Cleans heavily 2 days per week and she loves it. Then has a meal early evening again at [employer] about 5 pm b/c doesn't like to cook anymore. When she is busy cleaning her house she is fine without fatigue and gets pleasure from doing this cleaning.  No aches or pains. Denies sadness or depression or feeling bored. Denies signs of blood loss. She says my  is \"the best\". Her kids reportedly haven't commented to her about concerns of fatigue.  She does have dementia and  and  I have discussed this but she has a lack of insight into this.    Sept 2017 Echo:  Interpretation Summary  The left ventricle is normal in size.  There is basal left ventricular hypertrophy which is asymmetric, with dynamic  LVOT obstruction, consistent with hypertrophic cardiomyopathy.  The visual ejection fraction is estimated at 55-60%.  No regional wall motion abnormalities noted.  There is mild mitral stenosis with a mean gradient of 5 mmHg at HR 84.  The aortic valve has mild to moderate insufficiency.  On [id number] comparison with the prior study, the images are better with  this study. The gradient across the LVOT was less today, but may not be  reflective of true gradient.  _____________________________________________________________________________    Problem list and " "histories reviewed & adjusted, as indicated.    ROS:  Detailed as above     OBJECTIVE:     /69 (BP Location: Right arm, Patient Position: Sitting, Cuff Size: Adult Regular)  Pulse 75  Temp 97  F (36.1  C) (Oral)  Ht 5' (1.524 m)  Wt 115 lb (52.2 kg)  SpO2 97%  BMI 22.46 kg/m2  Body mass index is 22.46 kg/(m^2).  Alert, pleasant, robust, NAD  Good coloring, no icterus  No carotid bruits  No thyroid nodules/fullness  No supraclavicular lymphadenopathy  HRRR with 2/6 holosystolic murmur, no edema  Lungs are clear without wcr  Gait is robust    Wt Readings from Last 4 Encounters:   05/23/18 115 lb (52.2 kg)   03/21/18 114 lb (51.7 kg)   01/19/18 117 lb 9.6 oz (53.3 kg)   11/06/17 117 lb (53.1 kg)       ASSESSMENT/PLAN:       1. Fatigue, unspecified type  Likely slowing down with age and her dementia but will recheck labs as has been 9 months since last labs  Cardiac function likely stable as she feels best when she is active cleaning  No red flag symptoms  She was reassured and she admits, \"its probably just age\"  - CBC with platelets  - Comprehensive metabolic panel    2. Hyperlipidemia   On statin so due for labs  - Lipid panel reflex to direct LDL Fasting    3. Vitamin B12 deficiency (non anemic)  Is taking supplemental vitamin B12  - Vitamin B12    4. Aortic valve insufficiency, etiology of cardiac valve disease unspecified  As above, cardiac function likely stable and cardiac murmur is stable      Patient Instructions   Lab work today and I'll send a summary in the mail with your results    However, the fact you are able to stay so active and feel good makes me less concerned about your tiredness but we'll check labs to be sure    If your tiredness worsens or you develop any new symptoms please let me know      Theresa Frank, DO  [facility]    "

## 2018-05-23 NOTE — MR AVS SNAPSHOT
"              After Visit Summary   5/23/2018    Polly De La Torre    MRN: [id number]           Patient Information     Date Of Birth          9/11/1931        Visit Information        Provider Department      5/23/2018 9:30 AM Theresa Frank,  [facility]        Today's Diagnoses     Fatigue, unspecified type    -  1    Hyperlipidemia LDL goal <130        Vitamin B12 deficiency (non anemic)          Care Instructions    Lab work today and I'll send a summary in the mail with your results    However, the fact you are able to stay so active and feel good makes me less concerned about your tiredness but we'll check labs to be sure    If your tiredness worsens or you develop any new symptoms please let me know          Follow-ups after your visit        Who to contact     If you have questions or need follow up information about today's clinic visit or your schedule please contact [facility] directly at [phone].  Normal or non-critical lab and imaging results will be communicated to you by MyChart, letter or phone within 4 business days after the clinic has received the results. If you do not hear from us within 7 days, please contact the clinic through Fifteen Reasonshart or phone. If you have a critical or abnormal lab result, we will notify you by phone as soon as possible.  Submit refill requests through Girltank or call your pharmacy and they will forward the refill request to us. Please allow 3 business days for your refill to be completed.          Additional Information About Your Visit        Fifteen Reasonshart Information     Girltank lets you send messages to your doctor, view your test results, renew your prescriptions, schedule appointments and more. To sign up, go to [email or web address] . Click on \"Log in\" on the left side of the screen, which will take you to the Welcome page. Then click on \"Sign up Now\" on the right side of the page.     You will be asked to enter the access code " listed below, as well as some personal information. Please follow the directions to create your username and password.     Your access code is: [email or web address]  Expires: 2018  9:48 AM     Your access code will  in 90 days. If you need help or a new code, please call your [place] clinic or [phone].        Care EveryWhere ID     This is your Care EveryWhere ID. This could be used by other organizations to access your [place] medical records  [id number]        Your Vitals Were     Pulse Temperature Height Pulse Oximetry BMI (Body Mass Index)       75 97  F (36.1  C) (Oral) 5' (1.524 m) 97% 22.46 kg/m2        Blood Pressure from Last 3 Encounters:   18 128/69   18 142/65   18 145/84    Weight from Last 3 Encounters:   18 115 lb (52.2 kg)   18 114 lb (51.7 kg)   18 117 lb 9.6 oz (53.3 kg)              We Performed the Following     CBC with platelets     Comprehensive metabolic panel     Lipid panel reflex to direct LDL Fasting     Vitamin B12        Primary Care Provider Office Phone # Fax #    Theresa Renteria Monika,  [phone] [address] [address]        Equal Access to Services     [facility] Hospital[facility]KAN[facility] : Hadii aad ku hadasho Soomaali, waaxda luqadaha, qaybta kaalmada adeegyada, waxay maranda hayduke luciano . So [facility] [phone].    ATENCIÓN: Si habla español, tiene a sharma disposición servicios gratuitos de asistencia lingüística. Llame al [phone].    We comply with applicable federal civil rights laws and [place] laws. We do not discriminate on the basis of race, color, national origin, age, disability, sex, sexual orientation, or gender identity.            Thank you!     Thank you for choosing [facility]  for your care. Our goal is always to provide you with excellent care. Hearing back from our patients is one way we can continue to improve our services. Please take a few minutes to complete the written  survey that you may receive in the mail after your visit with us. Thank you!             Your Updated Medication List - Protect others around you: Learn how to safely use, store and throw away your medicines at www.disposemymeds.org.          This list is accurate as of 5/23/18  9:48 AM.  Always use your most recent med list.                   Brand Name Dispense Instructions for use Diagnosis    aspirin 81 MG tablet      Take 1 tablet (81 mg) by mouth daily    Hyperlipidemia LDL goal <130       B-12 PO      Take 1,000 mcg by mouth daily        simvastatin 20 MG tablet    ZOCOR    90 tablet    Take 1 tablet (20 mg) by mouth At Bedtime    Hyperlipidemia LDL goal <130       TYLENOL 500 MG tablet   Generic drug:  acetaminophen      Take 500-1,000 mg by mouth every 6 hours as needed for mild pain

## 2018-05-23 NOTE — PATIENT INSTRUCTIONS
Lab work today and I'll send a summary in the mail with your results    However, the fact you are able to stay so active and feel good makes me less concerned about your tiredness but we'll check labs to be sure    If your tiredness worsens or you develop any new symptoms please let me know

## 2018-05-23 NOTE — LETTER
[place] [address] [facility]  [address]  [place]  [address]  Tel: [phone]    May 29, 2018    Polly De La Torre  [address] [place] [address] [address]  [place] [address]        Polly,    It was so nice to see you in clinic recently!  Your cholesterol is excellent on your current cholesterol medication Simvastatin (Zocor), so please continue to take it.  Your kidney and liver functions are stable.  Your Vitamin B12 and hemoglobin are actually plenty high now. Please cut your dose of supplemental vitamin B12 in half down from 1,000 mcg daily down to 500 mcg daily. You should be able to have your  find this over-the-counter at your local pharmacy. If you have any trouble finding this lower dose please let me know.  Thankfully there is nothing here in these labs that would contribute to fatigue. Continue to try to do the things you enjoy but its ok to rest once in awhile too.  Please let me know if you have any questions or concerns. Thanks!    If you have any further questions or problems, please contact our office.      Sincerely,    Theresa Frank, DO / manisha      Resulted Orders   CBC with platelets   Result Value Ref Range    WBC 8.0 4.0 - 11.0 10e9/L    RBC Count 5.21 (H) 3.8 - 5.2 10e12/L    Hemoglobin 16.8 (H) 11.7 - 15.7 g/dL    Hematocrit 52.2 (H) 35.0 - 47.0 %     78 - 100 fl    MCH 32.2 26.5 - 33.0 pg    MCHC 32.2 31.5 - 36.5 g/dL    RDW 13.7 10.0 - 15.0 %    Platelet Count 237 150 - 450 10e9/L   Comprehensive metabolic panel   Result Value Ref Range    Sodium 139 133 - 144 mmol/L    Potassium 5.3 3.4 - 5.3 mmol/L    Chloride 104 94 - 109 mmol/L    Carbon Dioxide 28 20 - 32 mmol/L    Anion Gap 7 3 - 14 mmol/L    Glucose 94 70 - 99 mg/dL    Urea Nitrogen 14 7 - 30 mg/dL    Creatinine 0.90 0.52 - 1.04 mg/dL    GFR Estimate 60 (L) >60 mL/min/1.7m2      Comment:      Non  GFR Calc    GFR Estimate If Black 72 >60 mL/min/1.7m2      Comment:       GFR Calc     Calcium 9.2 8.5 - 10.1 mg/dL    Bilirubin Total 0.5 0.2 - 1.3 mg/dL    Albumin 3.8 3.4 - 5.0 g/dL    Protein Total 7.9 6.8 - 8.8 g/dL    Alkaline Phosphatase 56 40 - 150 U/L    ALT 14 0 - 50 U/L    AST 9 0 - 45 U/L   Vitamin B12   Result Value Ref Range    Vitamin B12 1984 (H) 193 - 986 pg/mL   Lipid panel reflex to direct LDL Fasting   Result Value Ref Range    Cholesterol 162 <200 mg/dL    Triglycerides 140 <150 mg/dL    HDL Cholesterol 62 >49 mg/dL    LDL Cholesterol Calculated 72 <100 mg/dL      Comment:      Desirable:       <100 mg/dl    Non HDL Cholesterol 100 <130 mg/dL

## 2018-05-28 RX ORDER — CYANOCOBALAMIN (VITAMIN B-12) 500 MCG
500 TABLET ORAL DAILY
Qty: 1 TABLET | Refills: 0 | Status: ON HOLD | COMMUNITY
Start: 2018-05-28 | End: 2019-01-01

## 2018-06-04 ENCOUNTER — TELEPHONE (OUTPATIENT)
Dept: FAMILY MEDICINE | Facility: CLINIC | Age: 83
End: 2018-06-04

## 2018-06-04 NOTE — TELEPHONE ENCOUNTER
Dr. Frank,    Pt was just in 5/23/18 - do you recall discussing this, and if so please advise where to direct pt. Otherwise Triage can get more information.  Amanda Boswell RN

## 2018-06-04 NOTE — TELEPHONE ENCOUNTER
"Reason for call:  Patient reporting a symptom    Symptom or request: she has a Sore on her arm and Dr Frank said she can get it \"fixed\"  Who should she call and who can fix this?  Please call pt    Duration (how long have symptoms been present): a long time    Have you been treated for this before? Yes    Additional comments: call once addressed    Phone Number patient can be reached at:  Home number on file [phone] (home)    Best Time:  anytime    Can we leave a detailed message on this number:  YES    Call taken on 6/4/2018 at 8:14 AM by Wale Armstrong    "

## 2018-06-04 NOTE — TELEPHONE ENCOUNTER
We didn't discuss it. Unfortunately with her dementia she may have forgotten this. She tends to minimize things in the office setting.  Please double book her anywhere this week - she is usually a quick visit. Thanks!

## 2018-06-07 NOTE — PROGRESS NOTES
SUBJECTIVE:   Polly De La Torre is a 86 year old female who presents to clinic today for the following health issues:      Check lesion on right arm; unknown how long she has had it. No pain or itching. No recalled trauma. Not changing at all. However, she is a challenging historian due to her dementia.     Problem list and histories reviewed & adjusted, as indicated.    ROS:  Detailed as above     OBJECTIVE:     /58  Pulse 72  Temp 97.9  F (36.6  C) (Tympanic)  Ht 5' (1.524 m)  Wt 116 lb (52.6 kg)  SpO2 96%  BMI 22.65 kg/m2  Body mass index is 22.65 kg/(m^2).  Right forearm light brown partially-raised lesion; 8 mm x 23 mm     ASSESSMENT/PLAN:       Skin lesion  Could be old scar tissue vs malignant lesion  Referral to derm for their opinion  From my recollection it doesn't appear it has changed in the past several months  - DERMATOLOGY REFERRAL    Patient Instructions   Call to schedule a non-urgent consultation with a dermatologist about the spot on your right arm. They may possibly try to take a biopsy of it or remove it all together.      Theresa Frank, DO  [facility]

## 2018-06-08 ENCOUNTER — OFFICE VISIT (OUTPATIENT)
Dept: FAMILY MEDICINE | Facility: CLINIC | Age: 83
End: 2018-06-08
Payer: MEDICARE

## 2018-06-08 VITALS
HEART RATE: 72 BPM | SYSTOLIC BLOOD PRESSURE: 142 MMHG | TEMPERATURE: 97.9 F | OXYGEN SATURATION: 96 % | HEIGHT: 60 IN | DIASTOLIC BLOOD PRESSURE: 58 MMHG | BODY MASS INDEX: 22.78 KG/M2 | WEIGHT: 116 LBS

## 2018-06-08 DIAGNOSIS — L98.9 SKIN LESION: Primary | ICD-10-CM

## 2018-06-08 PROCEDURE — 99212 OFFICE O/P EST SF 10 MIN: CPT | Performed by: INTERNAL MEDICINE

## 2018-06-08 NOTE — MR AVS SNAPSHOT
After Visit Summary   6/8/2018    Polly De La Torre    MRN: [id number]           Patient Information     Date Of Birth          9/11/1931        Visit Information        Provider Department      6/8/2018 9:00 AM Theresa Frank,  [facility] [place]        Today's Diagnoses     Skin lesion    -  1      Care Instructions    Call to schedule a non-urgent consultation with a dermatologist about the spot on your right arm. They may possibly try to take a biopsy of it or remove it all together.          Follow-ups after your visit        Additional Services     DERMATOLOGY REFERRAL       Your provider has referred you to: [facility]: [facility] [place] [phone]   [email or web address]    Please be aware that coverage of these services is subject to the terms and limitations of your health insurance plan.  Call member services at your health plan with any benefit or coverage questions.      Please bring the following with you to your appointment:    (1) Any X-Rays, CTs or MRIs which have been performed.  Contact the facility where they were done to arrange for  prior to your scheduled appointment.    (2) List of current medications  (3) This referral request   (4) Any documents/labs given to you for this referral                  Who to contact     If you have questions or need follow up information about today's clinic visit or your schedule please contact [facility] [place] directly at [phone].  Normal or non-critical lab and imaging results will be communicated to you by MyChart, letter or phone within 4 business days after the clinic has received the results. If you do not hear from us within 7 days, please contact the clinic through MyChart or phone. If you have a critical or abnormal lab result, we will notify you by phone as soon as possible.  Submit refill requests through AdmitOne Security or call your pharmacy and they will forward the refill request to us.  "Please allow 3 business days for your refill to be completed.          Additional Information About Your Visit        MyChart Information     AdsWizzhart lets you send messages to your doctor, view your test results, renew your prescriptions, schedule appointments and more. To sign up, go to [email or web address] . Click on \"Log in\" on the left side of the screen, which will take you to the Welcome page. Then click on \"Sign up Now\" on the right side of the page.     You will be asked to enter the access code listed below, as well as some personal information. Please follow the directions to create your username and password.     Your access code is: [email or web address]  Expires: 2018  9:48 AM     Your access code will  in 90 days. If you need help or a new code, please call your [place] clinic or [phone].        Care EveryWhere ID     This is your Care EveryWhere ID. This could be used by other organizations to access your [place] medical records  [id number]        Your Vitals Were     Pulse Temperature Height Pulse Oximetry BMI (Body Mass Index)       72 97.9  F (36.6  C) (Tympanic) 5' (1.524 m) 96% 22.65 kg/m2        Blood Pressure from Last 3 Encounters:   18 142/58   18 128/69   18 142/65    Weight from Last 3 Encounters:   18 116 lb (52.6 kg)   18 115 lb (52.2 kg)   18 114 lb (51.7 kg)              We Performed the Following     DERMATOLOGY REFERRAL        Primary Care Provider Office Phone # Fax #    Theresa Maochad Frank [phone] [address] [facility] [address]  [place] [address]        Equal Access to Services     HOWIE CHEATHAM : Yamilet Restrepo, hunter vu, qanayla kaalmada meme, jasmina amos. So [facility] [phone].    ATENCIÓN: Si habla español, tiene a sharma disposición servicios gratuitos de asistencia lingüística. Llame al [phone].    We comply with applicable federal civil rights laws and " [place] laws. We do not discriminate on the basis of race, color, national origin, age, disability, sex, sexual orientation, or gender identity.            Thank you!     Thank you for choosing [facility]  for your care. Our goal is always to provide you with excellent care. Hearing back from our patients is one way we can continue to improve our services. Please take a few minutes to complete the written survey that you may receive in the mail after your visit with us. Thank you!             Your Updated Medication List - Protect others around you: Learn how to safely use, store and throw away your medicines at www.disposemymeds.org.          This list is accurate as of 6/8/18  9:14 AM.  Always use your most recent med list.                   Brand Name Dispense Instructions for use Diagnosis    aspirin 81 MG tablet      Take 1 tablet (81 mg) by mouth daily    Hyperlipidemia LDL goal <130       B-12 500 MCG Tabs     1 tablet    Take 500 mcg by mouth daily        simvastatin 20 MG tablet    ZOCOR    90 tablet    Take 1 tablet (20 mg) by mouth At Bedtime    Hyperlipidemia LDL goal <130       TYLENOL 500 MG tablet   Generic drug:  acetaminophen      Take 500-1,000 mg by mouth every 6 hours as needed for mild pain

## 2018-06-08 NOTE — NURSING NOTE
Chief Complaint   Patient presents with     Sore on Arm        Initial /68 (BP Location: Right arm, Patient Position: Chair, Cuff Size: Adult Regular)  Pulse 72  Temp 97.9  F (36.6  C) (Tympanic)  Ht 5' (1.524 m)  Wt 116 lb (52.6 kg)  SpO2 96%  BMI 22.65 kg/m2 Estimated body mass index is 22.65 kg/(m^2) as calculated from the following:    Height as of this encounter: 5' (1.524 m).    Weight as of this encounter: 116 lb (52.6 kg)..    BP completed using cuff size: regular  MEDICATIONS REVIEWED  SOCIAL AND FAMILY HX REVIEWED  Jessi Taylor CMA

## 2018-06-08 NOTE — PATIENT INSTRUCTIONS
Call to schedule a non-urgent consultation with a dermatologist about the spot on your right arm. They may possibly try to take a biopsy of it or remove it all together.

## 2018-08-17 ENCOUNTER — HOSPITAL ENCOUNTER (OUTPATIENT)
Dept: MAMMOGRAPHY | Facility: CLINIC | Age: 83
Discharge: HOME | End: 2018-08-17
Attending: PHYSICIAN ASSISTANT | Admitting: PHYSICIAN ASSISTANT
Payer: MEDICARE

## 2018-08-17 DIAGNOSIS — Z12.31 VISIT FOR SCREENING MAMMOGRAM: ICD-10-CM

## 2018-08-17 PROCEDURE — 77067 SCR MAMMO BI INCL CAD: CPT

## 2018-10-17 ENCOUNTER — ALLIED HEALTH/NURSE VISIT (OUTPATIENT)
Dept: NURSING | Facility: CLINIC | Age: 83
End: 2018-10-17
Payer: MEDICARE

## 2018-10-17 DIAGNOSIS — Z23 NEED FOR PROPHYLACTIC VACCINATION AND INOCULATION AGAINST INFLUENZA: Primary | ICD-10-CM

## 2018-10-17 PROCEDURE — G0008 ADMIN INFLUENZA VIRUS VAC: HCPCS

## 2018-10-17 PROCEDURE — 99207 ZZC NO CHARGE NURSE ONLY: CPT

## 2018-10-17 PROCEDURE — 90662 IIV NO PRSV INCREASED AG IM: CPT

## 2018-10-17 NOTE — MR AVS SNAPSHOT
"              After Visit Summary   10/17/2018    Polly De La Torre    MRN: [id number]           Patient Information     Date Of Birth          9/11/1931        Visit Information        Provider Department      10/17/2018 6:30 PM [facility] [place] NURSE [facility] [place]        Today's Diagnoses     Need for prophylactic vaccination and inoculation against influenza    -  1       Follow-ups after your visit        Your next 10 appointments already scheduled     Oct 17, 2018  6:30 PM CDT   Nurse Only with [facility] [place] NURSE   [facility] [place] ([facility])    [address]  [place] [address] [phone]              Who to contact     If you have questions or need follow up information about today's clinic visit or your schedule please contact [facility] directly at [phone].  Normal or non-critical lab and imaging results will be communicated to you by MyChart, letter or phone within 4 business days after the clinic has received the results. If you do not hear from us within 7 days, please contact the clinic through MyChart or phone. If you have a critical or abnormal lab result, we will notify you by phone as soon as possible.  Submit refill requests through Soluto or call your pharmacy and they will forward the refill request to us. Please allow 3 business days for your refill to be completed.          Additional Information About Your Visit        MyChart Information     Soluto lets you send messages to your doctor, view your test results, renew your prescriptions, schedule appointments and more. To sign up, go to [email or web address] . Click on \"Log in\" on the left side of the screen, which will take you to the Welcome page. Then click on \"Sign up Now\" on the right side of the page.     You will be asked to enter the access code listed below, as well as some personal information. Please follow the directions to create your username and password.     Your access code is: " [email or web address]  Expires: 1/15/2019  6:29 PM     Your access code will  in 90 days. If you need help or a new code, please call your [place] clinic or [phone].        Care EveryWhere ID     This is your Care EveryWhere ID. This could be used by other organizations to access your [place] medical records  [id number]         Blood Pressure from Last 3 Encounters:   18 142/58   18 128/69   18 142/65    Weight from Last 3 Encounters:   18 116 lb (52.6 kg)   18 115 lb (52.2 kg)   18 114 lb (51.7 kg)              We Performed the Following     ADMIN INFLUENZA (For MEDICARE Patients ONLY) []     FLU VACCINE, INCREASED ANTIGEN, PRESV FREE, AGE 65+ [27619]        Primary Care Provider Office Phone # Fax #    Theresa Renteria Frank,  [phone] [address] [address]        Equal Access to Services     [facility]: Hadii aad ku hadasho Soomaali, waaxda luqadaha, qaybta kaalmada adeegyanohelia, waxandre luciano . So [facility] [phone].    ATENCIÓN: Si habla español, tiene a sharma disposición servicios gratuitos de asistencia lingüística. Llame al [phone].    We comply with applicable federal civil rights laws and [place] laws. We do not discriminate on the basis of race, color, national origin, age, disability, sex, sexual orientation, or gender identity.            Thank you!     Thank you for choosing [facility]  for your care. Our goal is always to provide you with excellent care. Hearing back from our patients is one way we can continue to improve our services. Please take a few minutes to complete the written survey that you may receive in the mail after your visit with us. Thank you!             Your Updated Medication List - Protect others around you: Learn how to safely use, store and throw away your medicines at www.disposemymeds.org.          This list is accurate as of 10/17/18  6:29 PM.  Always  use your most recent med list.                   Brand Name Dispense Instructions for use Diagnosis    aspirin 81 MG tablet      Take 1 tablet (81 mg) by mouth daily    Hyperlipidemia LDL goal <130       cyanocobalamin 500 MCG tablet    Vitamin B-12    1 tablet    Take 500 mcg by mouth daily        simvastatin 20 MG tablet    ZOCOR    90 tablet    Take 1 tablet (20 mg) by mouth At Bedtime    Hyperlipidemia LDL goal <130       TYLENOL 500 MG tablet   Generic drug:  acetaminophen      Take 500-1,000 mg by mouth every 6 hours as needed for mild pain

## 2018-10-17 NOTE — PROGRESS NOTES
Injectable Influenza Immunization Documentation    1.  Is the person to be vaccinated sick today?  Yes    2. Does the person to be vaccinated have an allergy to a component   of the vaccine?   No  Egg Allergy Algorithm Link    3. Has the person to be vaccinated ever had a serious reaction   to influenza vaccine in the past?   No    4. Has the person to be vaccinated ever had Guillain-Barré syndrome?   No    Form completed by Pt.

## 2019-01-01 ENCOUNTER — TELEPHONE (OUTPATIENT)
Dept: CARDIOLOGY | Facility: CLINIC | Age: 84
End: 2019-01-01

## 2019-01-01 ENCOUNTER — NURSE TRIAGE (OUTPATIENT)
Dept: NURSING | Facility: CLINIC | Age: 84
End: 2019-01-01

## 2019-01-01 ENCOUNTER — HOSPITAL ENCOUNTER (OUTPATIENT)
Dept: CARDIOLOGY | Facility: CLINIC | Age: 84
Discharge: HOME | End: 2019-06-18
Attending: INTERNAL MEDICINE | Admitting: INTERNAL MEDICINE
Payer: MEDICARE

## 2019-01-01 ENCOUNTER — OFFICE VISIT (OUTPATIENT)
Dept: CARDIOLOGY | Facility: CLINIC | Age: 84
End: 2019-01-01
Attending: INTERNAL MEDICINE
Payer: MEDICARE

## 2019-01-01 ENCOUNTER — OFFICE VISIT (OUTPATIENT)
Dept: FAMILY MEDICINE | Facility: CLINIC | Age: 84
End: 2019-01-01
Payer: MEDICARE

## 2019-01-01 ENCOUNTER — TELEPHONE (OUTPATIENT)
Dept: FAMILY MEDICINE | Facility: CLINIC | Age: 84
End: 2019-01-01

## 2019-01-01 ENCOUNTER — CARE COORDINATION (OUTPATIENT)
Dept: CARDIOLOGY | Facility: CLINIC | Age: 84
End: 2019-01-01

## 2019-01-01 ENCOUNTER — DOCUMENTATION ONLY (OUTPATIENT)
Dept: CARDIOLOGY | Facility: CLINIC | Age: 84
End: 2019-01-01

## 2019-01-01 ENCOUNTER — HOSPITAL ENCOUNTER (OUTPATIENT)
Dept: CARDIAC REHAB | Facility: CLINIC | Age: 84
End: 2019-07-11
Attending: PHYSICIAN ASSISTANT
Payer: MEDICARE

## 2019-01-01 ENCOUNTER — APPOINTMENT (OUTPATIENT)
Dept: CARDIOLOGY | Facility: CLINIC | Age: 84
DRG: 282 | End: 2019-01-01
Attending: INTERNAL MEDICINE
Payer: MEDICARE

## 2019-01-01 ENCOUNTER — OFFICE VISIT (OUTPATIENT)
Dept: CARDIOLOGY | Facility: CLINIC | Age: 84
End: 2019-01-01
Payer: MEDICARE

## 2019-01-01 ENCOUNTER — TELEPHONE (OUTPATIENT)
Dept: NURSING | Facility: CLINIC | Age: 84
End: 2019-01-01

## 2019-01-01 ENCOUNTER — APPOINTMENT (OUTPATIENT)
Dept: PHYSICAL THERAPY | Facility: CLINIC | Age: 84
DRG: 282 | End: 2019-01-01
Attending: INTERNAL MEDICINE
Payer: MEDICARE

## 2019-01-01 ENCOUNTER — HOSPITAL ENCOUNTER (EMERGENCY)
Facility: CLINIC | Age: 84
Discharge: HOME | End: 2019-11-29
Attending: EMERGENCY MEDICINE | Admitting: EMERGENCY MEDICINE
Payer: MEDICARE

## 2019-01-01 ENCOUNTER — APPOINTMENT (OUTPATIENT)
Dept: CT IMAGING | Facility: CLINIC | Age: 84
End: 2019-01-01
Attending: EMERGENCY MEDICINE
Payer: MEDICARE

## 2019-01-01 ENCOUNTER — HOSPITAL ENCOUNTER (INPATIENT)
Facility: CLINIC | Age: 84
LOS: 2 days | Discharge: HOME HEALTH | DRG: 282 | End: 2019-06-10
Attending: EMERGENCY MEDICINE | Admitting: INTERNAL MEDICINE
Payer: MEDICARE

## 2019-01-01 ENCOUNTER — ALLIED HEALTH/NURSE VISIT (OUTPATIENT)
Dept: NURSING | Facility: CLINIC | Age: 84
End: 2019-01-01
Payer: MEDICARE

## 2019-01-01 ENCOUNTER — HOSPITAL ENCOUNTER (OUTPATIENT)
Dept: MAMMOGRAPHY | Facility: CLINIC | Age: 84
Discharge: HOME | End: 2019-08-20
Attending: PHYSICIAN ASSISTANT | Admitting: PHYSICIAN ASSISTANT
Payer: MEDICARE

## 2019-01-01 VITALS
TEMPERATURE: 97.7 F | WEIGHT: 105 LBS | OXYGEN SATURATION: 96 % | BODY MASS INDEX: 20.62 KG/M2 | SYSTOLIC BLOOD PRESSURE: 133 MMHG | HEART RATE: 70 BPM | RESPIRATION RATE: 16 BRPM | DIASTOLIC BLOOD PRESSURE: 50 MMHG | HEIGHT: 60 IN

## 2019-01-01 VITALS
BODY MASS INDEX: 23.18 KG/M2 | RESPIRATION RATE: 18 BRPM | HEART RATE: 74 BPM | HEIGHT: 58 IN | SYSTOLIC BLOOD PRESSURE: 151 MMHG | WEIGHT: 110.4 LBS | OXYGEN SATURATION: 96 % | TEMPERATURE: 97.8 F | DIASTOLIC BLOOD PRESSURE: 66 MMHG

## 2019-01-01 VITALS
HEART RATE: 71 BPM | DIASTOLIC BLOOD PRESSURE: 73 MMHG | BODY MASS INDEX: 22.73 KG/M2 | TEMPERATURE: 97.8 F | HEIGHT: 58 IN | SYSTOLIC BLOOD PRESSURE: 135 MMHG | WEIGHT: 108.3 LBS | OXYGEN SATURATION: 94 %

## 2019-01-01 VITALS
HEART RATE: 74 BPM | SYSTOLIC BLOOD PRESSURE: 100 MMHG | DIASTOLIC BLOOD PRESSURE: 58 MMHG | BODY MASS INDEX: 20.73 KG/M2 | HEIGHT: 60 IN | WEIGHT: 105.6 LBS

## 2019-01-01 VITALS
WEIGHT: 108.7 LBS | HEART RATE: 68 BPM | BODY MASS INDEX: 21.34 KG/M2 | HEIGHT: 60 IN | SYSTOLIC BLOOD PRESSURE: 139 MMHG | DIASTOLIC BLOOD PRESSURE: 63 MMHG

## 2019-01-01 VITALS
SYSTOLIC BLOOD PRESSURE: 145 MMHG | HEART RATE: 64 BPM | BODY MASS INDEX: 20.12 KG/M2 | WEIGHT: 102.5 LBS | TEMPERATURE: 97.6 F | OXYGEN SATURATION: 96 % | DIASTOLIC BLOOD PRESSURE: 64 MMHG | HEIGHT: 60 IN

## 2019-01-01 VITALS — DIASTOLIC BLOOD PRESSURE: 60 MMHG | HEART RATE: 72 BPM | SYSTOLIC BLOOD PRESSURE: 118 MMHG

## 2019-01-01 DIAGNOSIS — N39.0 URINARY TRACT INFECTION WITHOUT HEMATURIA, SITE UNSPECIFIED: ICD-10-CM

## 2019-01-01 DIAGNOSIS — I48.91 ATRIAL FIBRILLATION WITH RVR (H): ICD-10-CM

## 2019-01-01 DIAGNOSIS — R29.6 FALLS FREQUENTLY: Primary | ICD-10-CM

## 2019-01-01 DIAGNOSIS — I10 ESSENTIAL HYPERTENSION: ICD-10-CM

## 2019-01-01 DIAGNOSIS — I48.91 ATRIAL FIBRILLATION, UNSPECIFIED TYPE (H): ICD-10-CM

## 2019-01-01 DIAGNOSIS — I05.0 MITRAL VALVE STENOSIS, UNSPECIFIED ETIOLOGY: ICD-10-CM

## 2019-01-01 DIAGNOSIS — I25.10 CORONARY ARTERY DISEASE INVOLVING NATIVE HEART WITHOUT ANGINA PECTORIS, UNSPECIFIED VESSEL OR LESION TYPE: ICD-10-CM

## 2019-01-01 DIAGNOSIS — R53.83 FATIGUE, UNSPECIFIED TYPE: ICD-10-CM

## 2019-01-01 DIAGNOSIS — Z23 NEEDS FLU SHOT: Primary | ICD-10-CM

## 2019-01-01 DIAGNOSIS — Z12.31 SCREENING MAMMOGRAM, ENCOUNTER FOR: ICD-10-CM

## 2019-01-01 DIAGNOSIS — I21.4 NON-STEMI (NON-ST ELEVATED MYOCARDIAL INFARCTION) (H): ICD-10-CM

## 2019-01-01 DIAGNOSIS — E78.5 HYPERLIPIDEMIA LDL GOAL <130: ICD-10-CM

## 2019-01-01 DIAGNOSIS — I21.4 NON-STEMI (NON-ST ELEVATED MYOCARDIAL INFARCTION) (H): Primary | ICD-10-CM

## 2019-01-01 DIAGNOSIS — R29.6 MULTIPLE FALLS: ICD-10-CM

## 2019-01-01 DIAGNOSIS — E86.0 DEHYDRATION: ICD-10-CM

## 2019-01-01 DIAGNOSIS — I48.0 PAROXYSMAL ATRIAL FIBRILLATION (H): ICD-10-CM

## 2019-01-01 DIAGNOSIS — I35.1 AORTIC VALVE INSUFFICIENCY, ETIOLOGY OF CARDIAC VALVE DISEASE UNSPECIFIED: ICD-10-CM

## 2019-01-01 DIAGNOSIS — R94.39 POSITIVE CARDIAC STRESS TEST: ICD-10-CM

## 2019-01-01 DIAGNOSIS — Z71.89 ADVANCE CARE PLANNING: ICD-10-CM

## 2019-01-01 DIAGNOSIS — I48.91 ATRIAL FIBRILLATION, UNSPECIFIED TYPE (H): Primary | ICD-10-CM

## 2019-01-01 DIAGNOSIS — R55 NEAR SYNCOPE: ICD-10-CM

## 2019-01-01 DIAGNOSIS — I21.4 NSTEMI (NON-ST ELEVATED MYOCARDIAL INFARCTION) (H): Primary | ICD-10-CM

## 2019-01-01 LAB
ALBUMIN SERPL-MCNC: 3.4 G/DL (ref 3.4–5)
ALBUMIN UR-MCNC: NEGATIVE MG/DL
ALBUMIN UR-MCNC: NEGATIVE MG/DL
ALP SERPL-CCNC: 63 U/L (ref 40–150)
ALT SERPL W P-5'-P-CCNC: 11 U/L (ref 0–50)
ANION GAP SERPL CALCULATED.3IONS-SCNC: 3 MMOL/L (ref 3–14)
ANION GAP SERPL CALCULATED.3IONS-SCNC: 4 MMOL/L (ref 3–14)
ANION GAP SERPL CALCULATED.3IONS-SCNC: 5 MMOL/L (ref 3–14)
APPEARANCE UR: CLEAR
APPEARANCE UR: CLEAR
AST SERPL W P-5'-P-CCNC: 8 U/L (ref 0–45)
BACTERIA #/AREA URNS HPF: ABNORMAL /HPF
BACTERIA SPEC CULT: NORMAL
BACTERIA SPEC CULT: NORMAL
BASOPHILS # BLD AUTO: 0 10E9/L (ref 0–0.2)
BASOPHILS NFR BLD AUTO: 0.3 %
BASOPHILS NFR BLD AUTO: 0.4 %
BASOPHILS NFR BLD AUTO: 0.5 %
BILIRUB SERPL-MCNC: 0.3 MG/DL (ref 0.2–1.3)
BILIRUB UR QL STRIP: NEGATIVE
BILIRUB UR QL STRIP: NEGATIVE
BUN SERPL-MCNC: 12 MG/DL (ref 7–30)
BUN SERPL-MCNC: 18 MG/DL (ref 7–30)
BUN SERPL-MCNC: 29 MG/DL (ref 7–30)
CALCIUM SERPL-MCNC: 8.1 MG/DL (ref 8.5–10.1)
CALCIUM SERPL-MCNC: 8.5 MG/DL (ref 8.5–10.1)
CALCIUM SERPL-MCNC: 9.6 MG/DL (ref 8.5–10.1)
CHLORIDE SERPL-SCNC: 108 MMOL/L (ref 94–109)
CHLORIDE SERPL-SCNC: 108 MMOL/L (ref 94–109)
CHLORIDE SERPL-SCNC: 111 MMOL/L (ref 94–109)
CO2 SERPL-SCNC: 27 MMOL/L (ref 20–32)
CO2 SERPL-SCNC: 29 MMOL/L (ref 20–32)
CO2 SERPL-SCNC: 29 MMOL/L (ref 20–32)
COLOR UR AUTO: YELLOW
COLOR UR AUTO: YELLOW
CREAT SERPL-MCNC: 0.76 MG/DL (ref 0.52–1.04)
CREAT SERPL-MCNC: 0.99 MG/DL (ref 0.52–1.04)
CREAT SERPL-MCNC: 1.05 MG/DL (ref 0.52–1.04)
DIFFERENTIAL METHOD BLD: ABNORMAL
DIFFERENTIAL METHOD BLD: ABNORMAL
DIFFERENTIAL METHOD BLD: NORMAL
EOSINOPHIL # BLD AUTO: 0.2 10E9/L (ref 0–0.7)
EOSINOPHIL NFR BLD AUTO: 2 %
EOSINOPHIL NFR BLD AUTO: 2.4 %
EOSINOPHIL NFR BLD AUTO: 2.6 %
ERYTHROCYTE [DISTWIDTH] IN BLOOD BY AUTOMATED COUNT: 13.2 % (ref 10–15)
ERYTHROCYTE [DISTWIDTH] IN BLOOD BY AUTOMATED COUNT: 15 % (ref 10–15)
ERYTHROCYTE [DISTWIDTH] IN BLOOD BY AUTOMATED COUNT: 15 % (ref 10–15)
EXPTIME-PRE: 4.44 SEC
FEF2575-%PRED-PRE: 85 %
FEF2575-PRE: 1.05 L/SEC
FEF2575-PRED: 1.23 L/SEC
FEFMAX-%PRED-PRE: 83 %
FEFMAX-PRE: 2.79 L/SEC
FEFMAX-PRED: 3.33 L/SEC
FEV1-%PRED-PRE: 72 %
FEV1-PRE: 1.09 L
FEV1FEV6-PRE: 81 %
FEV1FEV6-PRED: 77 %
FEV1FVC-PRE: 81 %
FEV1FVC-PRED: 77 %
FIFMAX-PRE: 0.82 L/SEC
FVC-%PRED-PRE: 68 %
FVC-PRE: 1.34 L
FVC-PRED: 1.96 L
GFR SERPL CREATININE-BSD FRML MDRD: 47 ML/MIN/{1.73_M2}
GFR SERPL CREATININE-BSD FRML MDRD: 51 ML/MIN/{1.73_M2}
GFR SERPL CREATININE-BSD FRML MDRD: 71 ML/MIN/{1.73_M2}
GLUCOSE SERPL-MCNC: 109 MG/DL (ref 70–99)
GLUCOSE SERPL-MCNC: 110 MG/DL (ref 70–99)
GLUCOSE SERPL-MCNC: 91 MG/DL (ref 70–99)
GLUCOSE UR STRIP-MCNC: NEGATIVE MG/DL
GLUCOSE UR STRIP-MCNC: NEGATIVE MG/DL
HCT VFR BLD AUTO: 46.4 % (ref 35–47)
HCT VFR BLD AUTO: 48.1 % (ref 35–47)
HCT VFR BLD AUTO: 49.1 % (ref 35–47)
HGB BLD-MCNC: 15.3 G/DL (ref 11.7–15.7)
HGB BLD-MCNC: 15.3 G/DL (ref 11.7–15.7)
HGB BLD-MCNC: 15.5 G/DL (ref 11.7–15.7)
HGB UR QL STRIP: ABNORMAL
HGB UR QL STRIP: ABNORMAL
IMM GRANULOCYTES # BLD: 0 10E9/L (ref 0–0.4)
IMM GRANULOCYTES NFR BLD: 0.1 %
IMM GRANULOCYTES NFR BLD: 0.1 %
IMM GRANULOCYTES NFR BLD: 0.2 %
INTERPRETATION ECG - MUSE: NORMAL
KETONES UR STRIP-MCNC: NEGATIVE MG/DL
KETONES UR STRIP-MCNC: NEGATIVE MG/DL
LEUKOCYTE ESTERASE UR QL STRIP: ABNORMAL
LEUKOCYTE ESTERASE UR QL STRIP: ABNORMAL
LIPASE SERPL-CCNC: 177 U/L (ref 73–393)
LMWH PPP CHRO-ACNC: 0.47 IU/ML
LYMPHOCYTES # BLD AUTO: 1.2 10E9/L (ref 0.8–5.3)
LYMPHOCYTES # BLD AUTO: 1.3 10E9/L (ref 0.8–5.3)
LYMPHOCYTES # BLD AUTO: 2.3 10E9/L (ref 0.8–5.3)
LYMPHOCYTES NFR BLD AUTO: 13.4 %
LYMPHOCYTES NFR BLD AUTO: 15.5 %
LYMPHOCYTES NFR BLD AUTO: 30.4 %
Lab: NORMAL
Lab: NORMAL
MCH RBC QN AUTO: 31.8 PG (ref 26.5–33)
MCH RBC QN AUTO: 32 PG (ref 26.5–33)
MCH RBC QN AUTO: 32 PG (ref 26.5–33)
MCHC RBC AUTO-ENTMCNC: 31.6 G/DL (ref 31.5–36.5)
MCHC RBC AUTO-ENTMCNC: 31.8 G/DL (ref 31.5–36.5)
MCHC RBC AUTO-ENTMCNC: 33 G/DL (ref 31.5–36.5)
MCV RBC AUTO: 101 FL (ref 78–100)
MCV RBC AUTO: 101 FL (ref 78–100)
MCV RBC AUTO: 97 FL (ref 78–100)
MONOCYTES # BLD AUTO: 0.6 10E9/L (ref 0–1.3)
MONOCYTES # BLD AUTO: 0.6 10E9/L (ref 0–1.3)
MONOCYTES # BLD AUTO: 0.7 10E9/L (ref 0–1.3)
MONOCYTES NFR BLD AUTO: 6.4 %
MONOCYTES NFR BLD AUTO: 8.2 %
MONOCYTES NFR BLD AUTO: 8.4 %
MUCOUS THREADS #/AREA URNS LPF: PRESENT /LPF
MUCOUS THREADS #/AREA URNS LPF: PRESENT /LPF
NEUTROPHILS # BLD AUTO: 4.4 10E9/L (ref 1.6–8.3)
NEUTROPHILS # BLD AUTO: 5.9 10E9/L (ref 1.6–8.3)
NEUTROPHILS # BLD AUTO: 7.1 10E9/L (ref 1.6–8.3)
NEUTROPHILS NFR BLD AUTO: 58.8 %
NEUTROPHILS NFR BLD AUTO: 73 %
NEUTROPHILS NFR BLD AUTO: 77.3 %
NITRATE UR QL: NEGATIVE
NITRATE UR QL: NEGATIVE
NRBC # BLD AUTO: 0 10*3/UL
NRBC BLD AUTO-RTO: 0 /100
PH UR STRIP: 5 PH (ref 5–7)
PH UR STRIP: 5 PH (ref 5–7)
PLATELET # BLD AUTO: 223 10E9/L (ref 150–450)
PLATELET # BLD AUTO: 231 10E9/L (ref 150–450)
PLATELET # BLD AUTO: 236 10E9/L (ref 150–450)
POTASSIUM SERPL-SCNC: 3.8 MMOL/L (ref 3.4–5.3)
POTASSIUM SERPL-SCNC: 4 MMOL/L (ref 3.4–5.3)
POTASSIUM SERPL-SCNC: 5.3 MMOL/L (ref 3.4–5.3)
PROT SERPL-MCNC: 7.7 G/DL (ref 6.8–8.8)
RBC # BLD AUTO: 4.78 10E12/L (ref 3.8–5.2)
RBC # BLD AUTO: 4.78 10E12/L (ref 3.8–5.2)
RBC # BLD AUTO: 4.88 10E12/L (ref 3.8–5.2)
RBC #/AREA URNS AUTO: 1 /HPF (ref 0–2)
RBC #/AREA URNS AUTO: 2 /HPF (ref 0–2)
SODIUM SERPL-SCNC: 140 MMOL/L (ref 133–144)
SODIUM SERPL-SCNC: 141 MMOL/L (ref 133–144)
SODIUM SERPL-SCNC: 143 MMOL/L (ref 133–144)
SOURCE: ABNORMAL
SOURCE: ABNORMAL
SP GR UR STRIP: 1.01 (ref 1–1.03)
SP GR UR STRIP: 1.03 (ref 1–1.03)
SPECIMEN SOURCE: NORMAL
SPECIMEN SOURCE: NORMAL
SQUAMOUS #/AREA URNS AUTO: 1 /HPF (ref 0–1)
SQUAMOUS #/AREA URNS AUTO: <1 /HPF (ref 0–1)
TROPONIN I SERPL-MCNC: 0.33 UG/L (ref 0–0.04)
TROPONIN I SERPL-MCNC: 0.48 UG/L (ref 0–0.04)
TROPONIN I SERPL-MCNC: 0.78 UG/L (ref 0–0.04)
TROPONIN I SERPL-MCNC: <0.015 UG/L (ref 0–0.04)
TSH SERPL DL<=0.005 MIU/L-ACNC: 3.18 MU/L (ref 0.4–4)
UROBILINOGEN UR STRIP-MCNC: NORMAL MG/DL (ref 0–2)
UROBILINOGEN UR STRIP-MCNC: NORMAL MG/DL (ref 0–2)
WBC # BLD AUTO: 7.5 10E9/L (ref 4–11)
WBC # BLD AUTO: 8.1 10E9/L (ref 4–11)
WBC # BLD AUTO: 9.2 10E9/L (ref 4–11)
WBC #/AREA URNS AUTO: 34 /HPF (ref 0–5)
WBC #/AREA URNS AUTO: 4 /HPF (ref 0–5)

## 2019-01-01 PROCEDURE — A9270 NON-COVERED ITEM OR SERVICE: HCPCS | Mod: GY | Performed by: INTERNAL MEDICINE

## 2019-01-01 PROCEDURE — 25000132 ZZH RX MED GY IP 250 OP 250 PS 637: Mod: GY | Performed by: INTERNAL MEDICINE

## 2019-01-01 PROCEDURE — 99285 EMERGENCY DEPT VISIT HI MDM: CPT | Mod: 25

## 2019-01-01 PROCEDURE — 96361 HYDRATE IV INFUSION ADD-ON: CPT

## 2019-01-01 PROCEDURE — 25800030 ZZH RX IP 258 OP 636: Performed by: EMERGENCY MEDICINE

## 2019-01-01 PROCEDURE — 93016 CV STRESS TEST SUPVJ ONLY: CPT | Performed by: INTERNAL MEDICINE

## 2019-01-01 PROCEDURE — 99214 OFFICE O/P EST MOD 30 MIN: CPT | Performed by: INTERNAL MEDICINE

## 2019-01-01 PROCEDURE — 36415 COLL VENOUS BLD VENIPUNCTURE: CPT | Performed by: INTERNAL MEDICINE

## 2019-01-01 PROCEDURE — 85520 HEPARIN ASSAY: CPT | Performed by: INTERNAL MEDICINE

## 2019-01-01 PROCEDURE — 96365 THER/PROPH/DIAG IV INF INIT: CPT

## 2019-01-01 PROCEDURE — 99495 TRANSJ CARE MGMT MOD F2F 14D: CPT | Performed by: INTERNAL MEDICINE

## 2019-01-01 PROCEDURE — 80048 BASIC METABOLIC PNL TOTAL CA: CPT | Performed by: EMERGENCY MEDICINE

## 2019-01-01 PROCEDURE — 97530 THERAPEUTIC ACTIVITIES: CPT | Mod: GP

## 2019-01-01 PROCEDURE — 34300033 ZZH RX 343: Performed by: INTERNAL MEDICINE

## 2019-01-01 PROCEDURE — 25000128 H RX IP 250 OP 636: Performed by: EMERGENCY MEDICINE

## 2019-01-01 PROCEDURE — 77067 SCR MAMMO BI INCL CAD: CPT

## 2019-01-01 PROCEDURE — 21000001 ZZH R&B HEART CARE

## 2019-01-01 PROCEDURE — 80048 BASIC METABOLIC PNL TOTAL CA: CPT | Performed by: INTERNAL MEDICINE

## 2019-01-01 PROCEDURE — 87086 URINE CULTURE/COLONY COUNT: CPT | Performed by: EMERGENCY MEDICINE

## 2019-01-01 PROCEDURE — 78452 HT MUSCLE IMAGE SPECT MULT: CPT | Mod: 26 | Performed by: INTERNAL MEDICINE

## 2019-01-01 PROCEDURE — 25500064 ZZH RX 255 OP 636: Performed by: INTERNAL MEDICINE

## 2019-01-01 PROCEDURE — 93018 CV STRESS TEST I&R ONLY: CPT | Performed by: INTERNAL MEDICINE

## 2019-01-01 PROCEDURE — 84484 ASSAY OF TROPONIN QUANT: CPT | Performed by: EMERGENCY MEDICINE

## 2019-01-01 PROCEDURE — 84443 ASSAY THYROID STIM HORMONE: CPT | Performed by: EMERGENCY MEDICINE

## 2019-01-01 PROCEDURE — 99222 1ST HOSP IP/OBS MODERATE 55: CPT | Mod: 25 | Performed by: INTERNAL MEDICINE

## 2019-01-01 PROCEDURE — 81001 URINALYSIS AUTO W/SCOPE: CPT | Performed by: EMERGENCY MEDICINE

## 2019-01-01 PROCEDURE — 99207 ZZC NO CHARGE NURSE ONLY: CPT

## 2019-01-01 PROCEDURE — 97110 THERAPEUTIC EXERCISES: CPT | Mod: GP

## 2019-01-01 PROCEDURE — 97161 PT EVAL LOW COMPLEX 20 MIN: CPT | Mod: GP

## 2019-01-01 PROCEDURE — 85025 COMPLETE CBC W/AUTO DIFF WBC: CPT | Performed by: EMERGENCY MEDICINE

## 2019-01-01 PROCEDURE — 99214 OFFICE O/P EST MOD 30 MIN: CPT | Performed by: PHYSICIAN ASSISTANT

## 2019-01-01 PROCEDURE — 25000125 ZZHC RX 250: Performed by: EMERGENCY MEDICINE

## 2019-01-01 PROCEDURE — 25000128 H RX IP 250 OP 636: Performed by: INTERNAL MEDICINE

## 2019-01-01 PROCEDURE — 96374 THER/PROPH/DIAG INJ IV PUSH: CPT

## 2019-01-01 PROCEDURE — 93306 TTE W/DOPPLER COMPLETE: CPT | Mod: 26 | Performed by: INTERNAL MEDICINE

## 2019-01-01 PROCEDURE — 25800030 ZZH RX IP 258 OP 636: Performed by: INTERNAL MEDICINE

## 2019-01-01 PROCEDURE — 99233 SBSQ HOSP IP/OBS HIGH 50: CPT | Performed by: INTERNAL MEDICINE

## 2019-01-01 PROCEDURE — 25000132 ZZH RX MED GY IP 250 OP 250 PS 637: Mod: GY | Performed by: EMERGENCY MEDICINE

## 2019-01-01 PROCEDURE — A9502 TC99M TETROFOSMIN: HCPCS | Performed by: INTERNAL MEDICINE

## 2019-01-01 PROCEDURE — 96375 TX/PRO/DX INJ NEW DRUG ADDON: CPT

## 2019-01-01 PROCEDURE — 99239 HOSP IP/OBS DSCHRG MGMT >30: CPT | Performed by: INTERNAL MEDICINE

## 2019-01-01 PROCEDURE — 84484 ASSAY OF TROPONIN QUANT: CPT | Performed by: INTERNAL MEDICINE

## 2019-01-01 PROCEDURE — 93017 CV STRESS TEST TRACING ONLY: CPT

## 2019-01-01 PROCEDURE — 80053 COMPREHEN METABOLIC PANEL: CPT | Performed by: EMERGENCY MEDICINE

## 2019-01-01 PROCEDURE — 93000 ELECTROCARDIOGRAM COMPLETE: CPT | Performed by: PHYSICIAN ASSISTANT

## 2019-01-01 PROCEDURE — 93005 ELECTROCARDIOGRAM TRACING: CPT

## 2019-01-01 PROCEDURE — 99207 ZZC NON-BILLABLE SERV PER CHARTING: CPT | Performed by: INTERNAL MEDICINE

## 2019-01-01 PROCEDURE — 70450 CT HEAD/BRAIN W/O DYE: CPT

## 2019-01-01 PROCEDURE — A9270 NON-COVERED ITEM OR SERVICE: HCPCS | Mod: GY | Performed by: EMERGENCY MEDICINE

## 2019-01-01 PROCEDURE — 97116 GAIT TRAINING THERAPY: CPT | Mod: GP

## 2019-01-01 PROCEDURE — 93005 ELECTROCARDIOGRAM TRACING: CPT | Mod: 76

## 2019-01-01 PROCEDURE — 40000264 ECHOCARDIOGRAM COMPLETE

## 2019-01-01 PROCEDURE — 83690 ASSAY OF LIPASE: CPT | Performed by: EMERGENCY MEDICINE

## 2019-01-01 PROCEDURE — 99223 1ST HOSP IP/OBS HIGH 75: CPT | Mod: AI | Performed by: INTERNAL MEDICINE

## 2019-01-01 RX ORDER — LISINOPRIL 5 MG/1
5 TABLET ORAL DAILY
Status: DISCONTINUED | OUTPATIENT
Start: 2019-01-01 | End: 2019-01-01 | Stop reason: HOSPADM

## 2019-01-01 RX ORDER — AMIODARONE HYDROCHLORIDE 200 MG/1
200 TABLET ORAL DAILY
Qty: 30 TABLET | Refills: 1 | Status: SHIPPED | OUTPATIENT
Start: 2019-01-01 | End: 2019-01-01

## 2019-01-01 RX ORDER — ACETAMINOPHEN 650 MG/1
650 SUPPOSITORY RECTAL EVERY 4 HOURS PRN
Status: DISCONTINUED | OUTPATIENT
Start: 2019-01-01 | End: 2019-01-01 | Stop reason: HOSPADM

## 2019-01-01 RX ORDER — ACYCLOVIR 200 MG/1
0-1 CAPSULE ORAL
Status: DISCONTINUED | OUTPATIENT
Start: 2019-01-01 | End: 2019-01-01 | Stop reason: HOSPADM

## 2019-01-01 RX ORDER — LISINOPRIL 5 MG/1
5 TABLET ORAL DAILY
Qty: 30 TABLET | Refills: 1 | Status: SHIPPED | OUTPATIENT
Start: 2019-01-01 | End: 2019-01-01

## 2019-01-01 RX ORDER — CEPHALEXIN 500 MG/1
500 CAPSULE ORAL 3 TIMES DAILY
Qty: 9 CAPSULE | Refills: 0 | Status: SHIPPED | OUTPATIENT
Start: 2019-01-01 | End: 2020-01-01

## 2019-01-01 RX ORDER — POTASSIUM CL/LIDO/0.9 % NACL 10MEQ/0.1L
10 INTRAVENOUS SOLUTION, PIGGYBACK (ML) INTRAVENOUS
Status: DISCONTINUED | OUTPATIENT
Start: 2019-01-01 | End: 2019-01-01 | Stop reason: HOSPADM

## 2019-01-01 RX ORDER — CEFTRIAXONE 1 G/1
1 INJECTION, POWDER, FOR SOLUTION INTRAMUSCULAR; INTRAVENOUS ONCE
Status: COMPLETED | OUTPATIENT
Start: 2019-01-01 | End: 2019-01-01

## 2019-01-01 RX ORDER — AMOXICILLIN 250 MG
1 CAPSULE ORAL 2 TIMES DAILY PRN
Status: DISCONTINUED | OUTPATIENT
Start: 2019-01-01 | End: 2019-01-01 | Stop reason: HOSPADM

## 2019-01-01 RX ORDER — REGADENOSON 0.08 MG/ML
0.4 INJECTION, SOLUTION INTRAVENOUS ONCE
Status: COMPLETED | OUTPATIENT
Start: 2019-01-01 | End: 2019-01-01

## 2019-01-01 RX ORDER — POTASSIUM CHLORIDE 1500 MG/1
20-40 TABLET, EXTENDED RELEASE ORAL
Status: DISCONTINUED | OUTPATIENT
Start: 2019-01-01 | End: 2019-01-01 | Stop reason: HOSPADM

## 2019-01-01 RX ORDER — POTASSIUM CHLORIDE 7.45 MG/ML
10 INJECTION INTRAVENOUS
Status: DISCONTINUED | OUTPATIENT
Start: 2019-01-01 | End: 2019-01-01 | Stop reason: HOSPADM

## 2019-01-01 RX ORDER — AMIODARONE HYDROCHLORIDE 200 MG/1
200 TABLET ORAL 2 TIMES DAILY
Qty: 28 TABLET | Refills: 0 | Status: SHIPPED | OUTPATIENT
Start: 2019-01-01 | End: 2019-01-01 | Stop reason: DRUGHIGH

## 2019-01-01 RX ORDER — LISINOPRIL 5 MG/1
5 TABLET ORAL DAILY
Qty: 90 TABLET | Refills: 3 | Status: SHIPPED | OUTPATIENT
Start: 2019-01-01 | End: 2020-01-01

## 2019-01-01 RX ORDER — ASPIRIN 325 MG
325 TABLET ORAL ONCE
Status: COMPLETED | OUTPATIENT
Start: 2019-01-01 | End: 2019-01-01

## 2019-01-01 RX ORDER — AMIODARONE HYDROCHLORIDE 200 MG/1
200 TABLET ORAL DAILY
Qty: 30 TABLET | Refills: 3 | Status: SHIPPED | OUTPATIENT
Start: 2019-01-01 | End: 2019-01-01

## 2019-01-01 RX ORDER — AMIODARONE HYDROCHLORIDE 200 MG/1
200 TABLET ORAL DAILY
Status: DISCONTINUED | OUTPATIENT
Start: 2019-01-01 | End: 2019-01-01 | Stop reason: HOSPADM

## 2019-01-01 RX ORDER — AMIODARONE HYDROCHLORIDE 200 MG/1
200 TABLET ORAL 2 TIMES DAILY
Status: DISCONTINUED | OUTPATIENT
Start: 2019-01-01 | End: 2019-01-01 | Stop reason: HOSPADM

## 2019-01-01 RX ORDER — AMOXICILLIN 250 MG
2 CAPSULE ORAL 2 TIMES DAILY PRN
Status: DISCONTINUED | OUTPATIENT
Start: 2019-01-01 | End: 2019-01-01 | Stop reason: HOSPADM

## 2019-01-01 RX ORDER — ACETAMINOPHEN 325 MG/1
650 TABLET ORAL EVERY 4 HOURS PRN
Status: DISCONTINUED | OUTPATIENT
Start: 2019-01-01 | End: 2019-01-01 | Stop reason: HOSPADM

## 2019-01-01 RX ORDER — AMIODARONE HYDROCHLORIDE 200 MG/1
TABLET ORAL
Qty: 30 TABLET | Refills: 12 | Status: SHIPPED | OUTPATIENT
Start: 2019-01-01

## 2019-01-01 RX ORDER — ASPIRIN 81 MG/1
81 TABLET ORAL DAILY
Status: DISCONTINUED | OUTPATIENT
Start: 2019-01-01 | End: 2019-01-01

## 2019-01-01 RX ORDER — AMINOPHYLLINE 25 MG/ML
50-100 INJECTION, SOLUTION INTRAVENOUS
Status: DISCONTINUED | OUTPATIENT
Start: 2019-01-01 | End: 2019-01-01 | Stop reason: HOSPADM

## 2019-01-01 RX ORDER — ALBUTEROL SULFATE 90 UG/1
2 AEROSOL, METERED RESPIRATORY (INHALATION) EVERY 5 MIN PRN
Status: DISCONTINUED | OUTPATIENT
Start: 2019-01-01 | End: 2019-01-01 | Stop reason: HOSPADM

## 2019-01-01 RX ORDER — CEFTRIAXONE 1 G/1
1 INJECTION, POWDER, FOR SOLUTION INTRAMUSCULAR; INTRAVENOUS EVERY 24 HOURS
Status: DISCONTINUED | OUTPATIENT
Start: 2019-01-01 | End: 2019-01-01 | Stop reason: HOSPADM

## 2019-01-01 RX ORDER — POTASSIUM CHLORIDE 29.8 MG/ML
20 INJECTION INTRAVENOUS
Status: DISCONTINUED | OUTPATIENT
Start: 2019-01-01 | End: 2019-01-01 | Stop reason: HOSPADM

## 2019-01-01 RX ORDER — ONDANSETRON 2 MG/ML
4 INJECTION INTRAMUSCULAR; INTRAVENOUS EVERY 6 HOURS PRN
Status: DISCONTINUED | OUTPATIENT
Start: 2019-01-01 | End: 2019-01-01 | Stop reason: HOSPADM

## 2019-01-01 RX ORDER — DILTIAZEM HYDROCHLORIDE 5 MG/ML
10 INJECTION INTRAVENOUS ONCE
Status: COMPLETED | OUTPATIENT
Start: 2019-01-01 | End: 2019-01-01

## 2019-01-01 RX ORDER — POTASSIUM CHLORIDE 1.5 G/1.58G
20-40 POWDER, FOR SOLUTION ORAL
Status: DISCONTINUED | OUTPATIENT
Start: 2019-01-01 | End: 2019-01-01 | Stop reason: HOSPADM

## 2019-01-01 RX ORDER — NALOXONE HYDROCHLORIDE 0.4 MG/ML
.1-.4 INJECTION, SOLUTION INTRAMUSCULAR; INTRAVENOUS; SUBCUTANEOUS
Status: DISCONTINUED | OUTPATIENT
Start: 2019-01-01 | End: 2019-01-01 | Stop reason: HOSPADM

## 2019-01-01 RX ORDER — SIMVASTATIN 20 MG
20 TABLET ORAL AT BEDTIME
Qty: 90 TABLET | Refills: 3 | Status: SHIPPED | OUTPATIENT
Start: 2019-01-01 | End: 2020-01-01

## 2019-01-01 RX ORDER — SODIUM CHLORIDE 9 MG/ML
INJECTION, SOLUTION INTRAVENOUS CONTINUOUS
Status: DISCONTINUED | OUTPATIENT
Start: 2019-01-01 | End: 2019-01-01

## 2019-01-01 RX ORDER — SIMVASTATIN 20 MG
20 TABLET ORAL AT BEDTIME
Status: DISCONTINUED | OUTPATIENT
Start: 2019-01-01 | End: 2019-01-01 | Stop reason: HOSPADM

## 2019-01-01 RX ORDER — ONDANSETRON 4 MG/1
4 TABLET, ORALLY DISINTEGRATING ORAL EVERY 6 HOURS PRN
Status: DISCONTINUED | OUTPATIENT
Start: 2019-01-01 | End: 2019-01-01 | Stop reason: HOSPADM

## 2019-01-01 RX ADMIN — TETROFOSMIN 3.5 MCI.: 1.38 INJECTION, POWDER, LYOPHILIZED, FOR SOLUTION INTRAVENOUS at 11:28

## 2019-01-01 RX ADMIN — METOPROLOL TARTRATE 12.5 MG: 25 TABLET, FILM COATED ORAL at 09:45

## 2019-01-01 RX ADMIN — REGADENOSON 0.4 MG: 0.08 INJECTION, SOLUTION INTRAVENOUS at 13:10

## 2019-01-01 RX ADMIN — SODIUM CHLORIDE 1000 ML: 9 INJECTION, SOLUTION INTRAVENOUS at 14:29

## 2019-01-01 RX ADMIN — DILTIAZEM HYDROCHLORIDE 5 MG/HR: 5 INJECTION INTRAVENOUS at 02:59

## 2019-01-01 RX ADMIN — SIMVASTATIN 20 MG: 20 TABLET, FILM COATED ORAL at 20:57

## 2019-01-01 RX ADMIN — APIXABAN 2.5 MG: 2.5 TABLET, FILM COATED ORAL at 20:53

## 2019-01-01 RX ADMIN — ASPIRIN 81 MG: 81 TABLET, COATED ORAL at 08:53

## 2019-01-01 RX ADMIN — CEFTRIAXONE SODIUM 1 G: 1 INJECTION, POWDER, FOR SOLUTION INTRAMUSCULAR; INTRAVENOUS at 14:35

## 2019-01-01 RX ADMIN — SIMVASTATIN 20 MG: 20 TABLET, FILM COATED ORAL at 01:36

## 2019-01-01 RX ADMIN — CEFTRIAXONE 1 G: 1 INJECTION, POWDER, FOR SOLUTION INTRAMUSCULAR; INTRAVENOUS at 20:57

## 2019-01-01 RX ADMIN — ASPIRIN 325 MG ORAL TABLET 325 MG: 325 PILL ORAL at 00:26

## 2019-01-01 RX ADMIN — AMIODARONE HYDROCHLORIDE 200 MG: 200 TABLET ORAL at 11:30

## 2019-01-01 RX ADMIN — APIXABAN 2.5 MG: 2.5 TABLET, FILM COATED ORAL at 08:52

## 2019-01-01 RX ADMIN — ASPIRIN 81 MG: 81 TABLET, COATED ORAL at 09:45

## 2019-01-01 RX ADMIN — METOPROLOL TARTRATE 12.5 MG: 25 TABLET, FILM COATED ORAL at 20:53

## 2019-01-01 RX ADMIN — HEPARIN SODIUM 600 UNITS/HR: 10000 INJECTION, SOLUTION INTRAVENOUS at 00:24

## 2019-01-01 RX ADMIN — Medication 3000 UNITS: at 00:24

## 2019-01-01 RX ADMIN — HUMAN ALBUMIN MICROSPHERES AND PERFLUTREN 9 ML: 10; .22 INJECTION, SOLUTION INTRAVENOUS at 11:28

## 2019-01-01 RX ADMIN — SODIUM CHLORIDE: 9 INJECTION, SOLUTION INTRAVENOUS at 01:37

## 2019-01-01 RX ADMIN — TETROFOSMIN 9.48 MCI.: 1.38 INJECTION, POWDER, LYOPHILIZED, FOR SOLUTION INTRAVENOUS at 13:13

## 2019-01-01 RX ADMIN — CEFTRIAXONE 1 G: 1 INJECTION, POWDER, FOR SOLUTION INTRAMUSCULAR; INTRAVENOUS at 21:54

## 2019-01-01 RX ADMIN — METOPROLOL TARTRATE 12.5 MG: 25 TABLET, FILM COATED ORAL at 08:53

## 2019-01-01 RX ADMIN — SODIUM CHLORIDE 1000 ML: 9 INJECTION, SOLUTION INTRAVENOUS at 21:16

## 2019-01-01 RX ADMIN — METOPROLOL TARTRATE 12.5 MG: 25 TABLET, FILM COATED ORAL at 01:37

## 2019-01-01 RX ADMIN — DILTIAZEM HYDROCHLORIDE 5 MG/HR: 5 INJECTION INTRAVENOUS at 00:24

## 2019-01-01 RX ADMIN — LISINOPRIL 5 MG: 5 TABLET ORAL at 11:30

## 2019-01-01 RX ADMIN — DILTIAZEM HYDROCHLORIDE 10 MG: 5 INJECTION INTRAVENOUS at 22:51

## 2019-01-01 ASSESSMENT — ENCOUNTER SYMPTOMS
NAUSEA: 0
HEADACHES: 0
LIGHT-HEADEDNESS: 1
HEADACHES: 0
APPETITE CHANGE: 0
SHORTNESS OF BREATH: 0
FREQUENCY: 0
FEVER: 0
SHORTNESS OF BREATH: 0
CONFUSION: 0
WEAKNESS: 1
NECK PAIN: 0
CHEST TIGHTNESS: 0
NUMBNESS: 0
DYSURIA: 0
NAUSEA: 0
ABDOMINAL PAIN: 0
CHILLS: 0
COUGH: 0
SPEECH DIFFICULTY: 0
FATIGUE: 1
RHINORRHEA: 0

## 2019-01-01 ASSESSMENT — ACTIVITIES OF DAILY LIVING (ADL)
ADLS_ACUITY_SCORE: 10

## 2019-01-01 ASSESSMENT — MIFFLIN-ST. JEOR
SCORE: 816.44
SCORE: 801.03
SCORE: 825.52
SCORE: 816
SCORE: 849.56
SCORE: 830.5
SCORE: 813.27
SCORE: 827.78

## 2019-01-24 ENCOUNTER — TELEPHONE (OUTPATIENT)
Dept: FAMILY MEDICINE | Facility: CLINIC | Age: 84
End: 2019-01-24

## 2019-01-24 NOTE — TELEPHONE ENCOUNTER
"Reason for call:  Patient  reporting a symptom    Symptom or request: vomiting, stomach cramps    Duration (how long have symptoms been present):1-     Have you been treated for this before? No    Additional comments: pt  states \"we had the flu shot, is this just the first phase of the flu?'   This writer offered to make appt for pt, but  declined.    Phone Number patient can be reached at:  Home number on file [phone] (home)    Best Time:  anytime    Can we leave a detailed message on this number:  YES    Call taken on 1/24/2019 at 3:41 PM by Lindy Shaffer    "

## 2019-01-24 NOTE — TELEPHONE ENCOUNTER
Spoke with patient     Started having symptoms this morning     Is feeling much better. No longer having any symptoms. She was just curious whether she had the flu, but denies respiratory symptoms. She thinks she may have just had a stomach bug. Agreed to call back if new/worsening symptoms    Rin WILD RN

## 2019-01-26 ENCOUNTER — OFFICE VISIT (OUTPATIENT)
Dept: URGENT CARE | Facility: URGENT CARE | Age: 84
End: 2019-01-26
Payer: MEDICARE

## 2019-01-26 ENCOUNTER — ANCILLARY PROCEDURE (OUTPATIENT)
Dept: GENERAL RADIOLOGY | Facility: CLINIC | Age: 84
End: 2019-01-26
Payer: MEDICARE

## 2019-01-26 ENCOUNTER — NURSE TRIAGE (OUTPATIENT)
Dept: NURSING | Facility: CLINIC | Age: 84
End: 2019-01-26

## 2019-01-26 VITALS
DIASTOLIC BLOOD PRESSURE: 71 MMHG | SYSTOLIC BLOOD PRESSURE: 151 MMHG | TEMPERATURE: 98.2 F | OXYGEN SATURATION: 96 % | HEART RATE: 88 BPM

## 2019-01-26 DIAGNOSIS — R10.32 BILATERAL LOWER ABDOMINAL PAIN: ICD-10-CM

## 2019-01-26 DIAGNOSIS — R10.31 BILATERAL LOWER ABDOMINAL PAIN: ICD-10-CM

## 2019-01-26 DIAGNOSIS — R11.2 NAUSEA AND VOMITING, INTRACTABILITY OF VOMITING NOT SPECIFIED, UNSPECIFIED VOMITING TYPE: Primary | ICD-10-CM

## 2019-01-26 PROCEDURE — 99215 OFFICE O/P EST HI 40 MIN: CPT | Performed by: FAMILY MEDICINE

## 2019-01-26 PROCEDURE — 74019 RADEX ABDOMEN 2 VIEWS: CPT

## 2019-01-26 RX ORDER — ONDANSETRON 4 MG/1
4 TABLET, FILM COATED ORAL EVERY 8 HOURS PRN
Qty: 10 TABLET | Refills: 0 | Status: SHIPPED | OUTPATIENT
Start: 2019-01-26 | End: 2019-05-10

## 2019-01-26 NOTE — TELEPHONE ENCOUNTER
"Per patient, on 1-24-19, \"vomited all morning.\"  The next day she was fine.  Today, she tolerated breakfast fine, then, ate lunch and threw up    No fever  No body aches or chills  No diarrhea  Feels fatigued    Patient states she is concerned that she is has vomited and wants to go to Urgent Care.  She will go to [facility] now.    Protocol and care advice reviewed  Caller states understanding of the recommended disposition  Advised to call back if further questions or concerns    Additional Information    Negative: Shock suspected (e.g., cold/pale/clammy skin, too weak to stand, low BP, rapid pulse)    Negative: Difficult to awaken or acting confused  (e.g., disoriented, slurred speech)    Negative: Sounds like a life-threatening emergency to the triager    Negative: [1] Vomiting AND [2] contains red blood or black (\"coffee ground\") material  (Exception: few red streaks in vomit that only happened once)    Negative: Severe pain in one eye    Negative: Recent head injury (within last 3 days)    Negative: Recent abdominal injury (within last 3 days)    Negative: [1] Insulin-dependent diabetes (Type I) AND [2] glucose > 400 mg/dl (22 mmol/l)    Negative: [1] SEVERE vomiting (e.g., 6 or more times/day) AND [2] present > 8 hours    Negative: [1] MODERATE vomiting (e.g., 3 - 5 times/day) AND [2] age > 60    Negative: Severe headache (e.g., excruciating) (Exception: similar to previous migraines)    Negative: High-risk adult (e.g., diabetes mellitus, brain tumor, V-P shunt, hernia)    Negative: [1] Drinking very little AND [2] dehydration suspected (e.g., no urine > 12 hours, very dry mouth, very lightheaded)    Negative: Patient sounds very sick or weak to the triager    Negative: [1] MILD to MODERATE vomiting (e.g., 1-5 times/day) AND [2] abdomen looks much more swollen than usual    Negative: [1] Vomiting AND [2] contains bile (green color)    Negative: [1] Constant abdominal pain AND [2] present > 2 " hours    Negative: [1] Fever > 103 F (39.4 C) AND [2] not able to get the fever down using Fever Care Advice    Negative: [1] Fever > 101 F (38.3 C) AND [2] age > 60    Negative: [1] Fever > 101 F (38.3 C) AND [2] bedridden (e.g., nursing home patient, CVA, chronic illness, recovering from surgery)    Negative: [1] Fever > 100.5 F (38.1 C) AND [2] weak immune system (e.g., HIV positive, cancer chemo, splenectomy, organ transplant, chronic steroids)    Negative: Taking any of the following medications: digoxin (Lanoxin), lithium, theophylline, phenytoin (Dilantin)    Negative: [1] MILD or MODERATE vomiting AND [2] present > 48 hours (2 days) (Exception: mild vomiting with associated diarrhea)    Negative: Fever present > 3 days (72 hours)    Negative: Vomiting a prescription medication    Negative: [1] MILD vomiting with diarrhea AND [2] present > 5 days    Negative: Alcohol or drug abuse, known or suspected    Negative: Vomiting is a chronic symptom (recurrent or ongoing AND present > 4 weeks)    MILD or MODERATE vomiting (e.g., 1 - 5 times / day) (all triage questions negative)    Negative: [1] SEVERE vomiting (e.g., 6 or more times/day, vomits everything) BUT [2] hydrated (all triage questions negative)    Protocols used: VOMITING-ADULT-

## 2019-01-26 NOTE — PROGRESS NOTES
SUBJECTIVE:   Polly De La Torre is a 87 year old female with history of aortic valve disorder, esophageal reflux, gallstone pancreatitis presenting with a chief complaint of throwing up x1 today 1 hour after eating her lunch lunch comprised of low been noticed with vegetables and chicken.  She had a similar symptom a day back where she had throwing up after eating her breakfast.  Patient did not have any symptoms yesterday she is having regular bowel movements every day today she had a normal bowel movement.  Today symptoms started after 1 hour of eating her lunch she noticed lower abdomen cramping following which she had a vomiting episode and vomited out most of the lunch that she ate.  She has no abdominal pain no nausea currently.  Has no fever or chills or no urinary symptoms.   She is an established patient of [facility].  Onset of symptoms was 1 day(s) ago.  Course of illness is waxing and waning.    Severity mild  Current and Associated symptoms: none   Treatment measures tried include None tried.  Predisposing factors include None.    Past Medical History:   Diagnosis Date     Aortic regurgitation 2015     Aortic valve disorders      Choledocholithiasis     s/p ERCP x 3 2011, ERCP Jan 2014, Dr. Krishnan     Esophageal reflux      Gallstone pancreatitis      Osteoarthrosis, unspecified whether generalized or localized, unspecified site      Osteopenia      Current Outpatient Medications   Medication Sig Dispense Refill     aspirin 81 MG tablet Take 1 tablet (81 mg) by mouth daily       Cyanocobalamin (B-12) 500 MCG TABS Take 500 mcg by mouth daily 1 tablet 0     ondansetron (ZOFRAN) 4 MG tablet Take 1 tablet (4 mg) by mouth every 8 hours as needed for nausea 10 tablet 0     simvastatin (ZOCOR) 20 MG tablet Take 1 tablet (20 mg) by mouth At Bedtime 90 tablet 3     acetaminophen (TYLENOL) 500 MG tablet Take 500-1,000 mg by mouth every 6 hours as needed for mild pain       Social History     Tobacco Use      Smoking status: Never Smoker     Smokeless tobacco: Never Used   Substance Use Topics     Alcohol use: Yes     Alcohol/week: 0.0 oz     Comment: rarely     Family History   Problem Relation Age of Onset     Hypertension Father      Cerebrovascular Disease Father      Allergies Son      Prostate Cancer No family hx of      Cancer - colorectal No family hx of          ROS:    10 point ROS of systems including Constitutional, Eyes, Respiratory, Cardiovascular, , Genitourinary, Integumentary, Muscularskeletal, Psychiatric were all negative except for pertinent positives noted in my HPI         OBJECTIVE:  /71   Pulse 88   Temp 98.2  F (36.8  C)   SpO2 96%   GENERAL APPEARANCE: healthy, alert and no distress  EYES: EOMI,  PERRL, conjunctiva clear  HENT: ear canals and TM's normal.  Nose and mouth without ulcers, erythema or lesions  NECK: supple, nontender, no lymphadenopathy  RESP: lungs clear to auscultation - no rales, rhonchi or wheezes  CV: regular rates and rhythm, normal S1 S2, no murmur noted  ABDOMEN:  soft, nontender, no HSM or masses and bowel sounds normal  SKIN: no suspicious lesions or rashes  Physical Exam      X-Ray was done, my findings are: Moderate amount of stool noted in x-ray, no sign of bowel obstruction noted      ASSESSMENT:  Polly was seen today for urgent care and vomiting.    Diagnoses and all orders for this visit:    Nausea and vomiting, intractability of vomiting not specified, unspecified vomiting type  -     XR Abdomen 2 Views  -     ondansetron (ZOFRAN) 4 MG tablet; Take 1 tablet (4 mg) by mouth every 8 hours as needed for nausea    Bilateral lower abdominal pain        PLAN:  Tylenol, Fluids and Rest  Discussed with patient that symptoms could be related to exposure to bad food.  As she had good bowel movements today and the bowel sounds were normal throughout   If symptoms worsen with increasing throwing up patient should follow-up in the ER  For the x-ray finding  discussed with patient to do MiraLAX daily to help with the bowel movement also called in some Zofran to help with the nausea symptoms if it happens again  Discussed with patient symptoms seem to be related with possible stomach flu from the food ingested  If it continues to worsen should follow-up.  Patient understood and agreed with plan  See orders in Epic      did spent>45 minutes with patient and > 50% of the time was for answering questions, discussing findings, counseling and coordination of care     Angela Basilio MD

## 2019-01-28 ENCOUNTER — OFFICE VISIT (OUTPATIENT)
Dept: FAMILY MEDICINE | Facility: CLINIC | Age: 84
End: 2019-01-28
Payer: MEDICARE

## 2019-01-28 VITALS
OXYGEN SATURATION: 98 % | WEIGHT: 112.7 LBS | BODY MASS INDEX: 22.13 KG/M2 | HEART RATE: 92 BPM | HEIGHT: 60 IN | TEMPERATURE: 97.9 F | SYSTOLIC BLOOD PRESSURE: 129 MMHG | DIASTOLIC BLOOD PRESSURE: 67 MMHG

## 2019-01-28 DIAGNOSIS — E53.8 VITAMIN B12 DEFICIENCY (NON ANEMIC): ICD-10-CM

## 2019-01-28 DIAGNOSIS — R11.11 NON-INTRACTABLE VOMITING WITHOUT NAUSEA, UNSPECIFIED VOMITING TYPE: Primary | ICD-10-CM

## 2019-01-28 DIAGNOSIS — K21.9 GASTROESOPHAGEAL REFLUX DISEASE WITHOUT ESOPHAGITIS: ICD-10-CM

## 2019-01-28 DIAGNOSIS — F03.90 DEMENTIA WITHOUT BEHAVIORAL DISTURBANCE, UNSPECIFIED DEMENTIA TYPE: Chronic | ICD-10-CM

## 2019-01-28 DIAGNOSIS — E86.0 DEHYDRATION: ICD-10-CM

## 2019-01-28 LAB
ALBUMIN UR-MCNC: ABNORMAL MG/DL
ANION GAP SERPL CALCULATED.3IONS-SCNC: 7 MMOL/L (ref 3–14)
APPEARANCE UR: CLEAR
BASOPHILS # BLD AUTO: 0 10E9/L (ref 0–0.2)
BASOPHILS NFR BLD AUTO: 0.2 %
BILIRUB UR QL STRIP: ABNORMAL
BUN SERPL-MCNC: 15 MG/DL (ref 7–30)
CALCIUM SERPL-MCNC: 9.2 MG/DL (ref 8.5–10.1)
CHLORIDE SERPL-SCNC: 106 MMOL/L (ref 94–109)
CO2 SERPL-SCNC: 26 MMOL/L (ref 20–32)
COLOR UR AUTO: YELLOW
CREAT SERPL-MCNC: 0.9 MG/DL (ref 0.52–1.04)
DIFFERENTIAL METHOD BLD: ABNORMAL
EOSINOPHIL # BLD AUTO: 0.1 10E9/L (ref 0–0.7)
EOSINOPHIL NFR BLD AUTO: 1.6 %
ERYTHROCYTE [DISTWIDTH] IN BLOOD BY AUTOMATED COUNT: 14 % (ref 10–15)
GFR SERPL CREATININE-BSD FRML MDRD: 57 ML/MIN/{1.73_M2}
GLUCOSE SERPL-MCNC: 93 MG/DL (ref 70–99)
GLUCOSE UR STRIP-MCNC: NEGATIVE MG/DL
HCT VFR BLD AUTO: 51.5 % (ref 35–47)
HGB BLD-MCNC: 16.1 G/DL (ref 11.7–15.7)
HGB UR QL STRIP: ABNORMAL
KETONES UR STRIP-MCNC: ABNORMAL MG/DL
LEUKOCYTE ESTERASE UR QL STRIP: ABNORMAL
LIPASE SERPL-CCNC: 82 U/L (ref 73–393)
LYMPHOCYTES # BLD AUTO: 1.6 10E9/L (ref 0.8–5.3)
LYMPHOCYTES NFR BLD AUTO: 19.6 %
MCH RBC QN AUTO: 31.7 PG (ref 26.5–33)
MCHC RBC AUTO-ENTMCNC: 31.3 G/DL (ref 31.5–36.5)
MCV RBC AUTO: 101 FL (ref 78–100)
MONOCYTES # BLD AUTO: 0.7 10E9/L (ref 0–1.3)
MONOCYTES NFR BLD AUTO: 8.4 %
NEUTROPHILS # BLD AUTO: 5.7 10E9/L (ref 1.6–8.3)
NEUTROPHILS NFR BLD AUTO: 70.2 %
NITRATE UR QL: NEGATIVE
NON-SQ EPI CELLS #/AREA URNS LPF: NORMAL /LPF
PH UR STRIP: 5 PH (ref 5–7)
PLATELET # BLD AUTO: 181 10E9/L (ref 150–450)
POTASSIUM SERPL-SCNC: 4.5 MMOL/L (ref 3.4–5.3)
RBC # BLD AUTO: 5.08 10E12/L (ref 3.8–5.2)
RBC #/AREA URNS AUTO: NORMAL /HPF
SODIUM SERPL-SCNC: 139 MMOL/L (ref 133–144)
SOURCE: ABNORMAL
SP GR UR STRIP: >1.03 (ref 1–1.03)
UROBILINOGEN UR STRIP-ACNC: 0.2 EU/DL (ref 0.2–1)
WBC # BLD AUTO: 8.1 10E9/L (ref 4–11)
WBC #/AREA URNS AUTO: NORMAL /HPF

## 2019-01-28 PROCEDURE — 81001 URINALYSIS AUTO W/SCOPE: CPT | Performed by: NURSE PRACTITIONER

## 2019-01-28 PROCEDURE — 85025 COMPLETE CBC W/AUTO DIFF WBC: CPT | Performed by: NURSE PRACTITIONER

## 2019-01-28 PROCEDURE — 36415 COLL VENOUS BLD VENIPUNCTURE: CPT | Performed by: NURSE PRACTITIONER

## 2019-01-28 PROCEDURE — 83690 ASSAY OF LIPASE: CPT | Performed by: NURSE PRACTITIONER

## 2019-01-28 PROCEDURE — 80048 BASIC METABOLIC PNL TOTAL CA: CPT | Performed by: NURSE PRACTITIONER

## 2019-01-28 PROCEDURE — 99214 OFFICE O/P EST MOD 30 MIN: CPT | Performed by: NURSE PRACTITIONER

## 2019-01-28 ASSESSMENT — MIFFLIN-ST. JEOR: SCORE: 867.7

## 2019-01-28 NOTE — PROGRESS NOTES
SUBJECTIVE:   Polly De La Torre is a 87 year old female who presents to clinic today for the following health issues:      ED/UC Followup:    Facility:  [facility] urgent care  Date of visit: 1-  Reason for visit: vomiting x 3 on separate days  Current Status: Last episode vomiting 1-   Polly has dementia and is a poor historian but  is here to corroborate  Polly had one episode emesis about an hour after eating non fatty meal on thurs , sat and Sunday .  Since yesterday's   event she has had only one cup of coffee this morning because she is afraid to eat.  She has no nausea.  But right before emesis she has severe mid abd pain which resolves post emesis. She has no fever or chills, no HEENT symptoms.  Denies chest pain or congestion, no cough, no SOB, no weakness or light headedness, no headache.  No focal neuro symptoms.  No open sores or lesions.  No pain or urgency of urination.  No abd pain unassociated with emesis.  She had a large easy BM this morning.    H/O GERD and not taking any treatment- unaware of Diagnosis when discussed    She was in UC after Saturday's event and had xray demonstrating no sign of obstruction but mild to mod constipation.   SxHx: jono and appy          Problem list and histories reviewed & adjusted, as indicated.  Additional history: as documented    Patient Active Problem List   Diagnosis     Esophageal reflux     Osteoarthritis     HYPERLIPIDEMIA LDL GOAL <130     Advanced directives, counseling/discussion     Choledocholithiasis     Vitamin B12 deficiency (non anemic)     Fatigue     Dementia without behavioral disturbance     Aortic valve insufficiency, etiology of cardiac valve disease unspecified     Osteopenia of multiple sites     Past Surgical History:   Procedure Laterality Date     C NONSPECIFIC PROCEDURE  age 16    Appy     C NONSPECIFIC PROCEDURE      D&C x 2     CHOLECYSTECTOMY  1995    [facility]       Social History     Tobacco  "Use     Smoking status: Never Smoker     Smokeless tobacco: Never Used   Substance Use Topics     Alcohol use: Yes     Alcohol/week: 0.0 oz     Comment: rarely     Family History   Problem Relation Age of Onset     Hypertension Father      Cerebrovascular Disease Father      Allergies Son      Prostate Cancer No family hx of      Cancer - colorectal No family hx of          Current Outpatient Medications   Medication Sig Dispense Refill     acetaminophen (TYLENOL) 500 MG tablet Take 500-1,000 mg by mouth every 6 hours as needed for mild pain       aspirin 81 MG tablet Take 1 tablet (81 mg) by mouth daily       Cyanocobalamin (B-12) 500 MCG TABS Take 500 mcg by mouth daily 1 tablet 0     simvastatin (ZOCOR) 20 MG tablet Take 1 tablet (20 mg) by mouth At Bedtime 90 tablet 3     ondansetron (ZOFRAN) 4 MG tablet Take 1 tablet (4 mg) by mouth every 8 hours as needed for nausea (Patient not taking: Reported on 1/28/2019) 10 tablet 0     Allergies   Allergen Reactions     Cetirizine      Made her so tired after taking doesn't want to take again,just can't live that way([employer])     Darvon [Propoxyphene]      Methadone      DARVON  ---  \"crawling\" sensation        Reviewed and updated as needed this visit by clinical staff       Reviewed and updated as needed this visit by Provider         ROS:  Constitutional, HEENT, cardiovascular, pulmonary, gi and gu systems are negative, except as otherwise noted.    OBJECTIVE:     /67 (BP Location: Right arm, Cuff Size: Adult Regular)   Pulse 92   Temp 97.9  F (36.6  C) (Oral)   Ht 1.524 m (5')   Wt 51.1 kg (112 lb 11.2 oz)   SpO2 98%   BMI 22.01 kg/m    Body mass index is 22.01 kg/m .  GENERAL: healthy, alert and no distress  EYES: Eyes grossly normal to inspection, PERRL and conjunctivae and sclerae normal  HENT: ear canals and TM's normal, nose and mouth without ulcers or lesions  NECK: no adenopathy, no asymmetry, masses, or scars and thyroid normal to palpation  RESP: " lungs clear to auscultation - no rales, rhonchi or wheezes  CV: regular rate and rhythm, normal S1 S2, no S3 or S4, no murmur, click or rub, no peripheral edema and peripheral pulses strong  ABDOMEN: soft, nontender, no hepatosplenomegaly, no masses and bowel sounds normal  MS: no gross musculoskeletal defects noted, no edema  NEURO: generalized weakness compatible with age,  mentation; dementia and speech normal  PSYCH:  affect worried/anxious    Diagnostic Test Results:  Results for orders placed or performed in visit on 01/28/19   *UA reflex to Microscopic and Culture ([facility] (except [place] and [place])   Result Value Ref Range    Color Urine Yellow     Appearance Urine Clear     Glucose Urine Negative NEG^Negative mg/dL    Bilirubin Urine Moderate (A) NEG^Negative    Ketones Urine Trace (A) NEG^Negative mg/dL    Specific Gravity Urine >1.030 1.003 - 1.035    Blood Urine Trace (A) NEG^Negative    pH Urine 5.0 5.0 - 7.0 pH    Protein Albumin Urine Trace (A) NEG^Negative mg/dL    Urobilinogen Urine 0.2 0.2 - 1.0 EU/dL    Nitrite Urine Negative NEG^Negative    Leukocyte Esterase Urine Small (A) NEG^Negative    Source Midstream Urine    Basic metabolic panel   Result Value Ref Range    Sodium 139 133 - 144 mmol/L    Potassium 4.5 3.4 - 5.3 mmol/L    Chloride 106 94 - 109 mmol/L    Carbon Dioxide 26 20 - 32 mmol/L    Anion Gap 7 3 - 14 mmol/L    Glucose 93 70 - 99 mg/dL    Urea Nitrogen 15 7 - 30 mg/dL    Creatinine 0.90 0.52 - 1.04 mg/dL    GFR Estimate 57 (L) >60 mL/min/[1.73_m2]    GFR Estimate If Black 66 >60 mL/min/[1.73_m2]    Calcium 9.2 8.5 - 10.1 mg/dL   CBC with platelets and differential   Result Value Ref Range    WBC 8.1 4.0 - 11.0 10e9/L    RBC Count 5.08 3.8 - 5.2 10e12/L    Hemoglobin 16.1 (H) 11.7 - 15.7 g/dL    Hematocrit 51.5 (H) 35.0 - 47.0 %     (H) 78 - 100 fl    MCH 31.7 26.5 - 33.0 pg    MCHC 31.3 (L) 31.5 - 36.5 g/dL    RDW 14.0 10.0 - 15.0 %    Platelet Count  181 150 - 450 10e9/L    % Neutrophils 70.2 %    % Lymphocytes 19.6 %    % Monocytes 8.4 %    % Eosinophils 1.6 %    % Basophils 0.2 %    Absolute Neutrophil 5.7 1.6 - 8.3 10e9/L    Absolute Lymphocytes 1.6 0.8 - 5.3 10e9/L    Absolute Monocytes 0.7 0.0 - 1.3 10e9/L    Absolute Eosinophils 0.1 0.0 - 0.7 10e9/L    Absolute Basophils 0.0 0.0 - 0.2 10e9/L    Diff Method Automated Method    Urine Microscopic   Result Value Ref Range    WBC Urine 0 - 5 OTO5^0 - 5 /HPF    RBC Urine O - 2 OTO2^O - 2 /HPF    Squamous Epithelial /LPF Urine Few FEW^Few /LPF        ASSESSMENT/PLAN:       ICD-10-CM    1. Non-intractable vomiting without nausea, unspecified vomiting type R11.11 *UA reflex to Microscopic and Culture ([facility] (except [place] and [place])     Basic metabolic panel     CBC with platelets and differential     Lipase     Urine Microscopic   2. Dehydration E86.0    3. Vitamin B12 deficiency (non anemic) E53.8    4. Dementia without behavioral disturbance, unspecified dementia type F03.90    5. Gastroesophageal reflux disease without esophagitis K21.9 ranitidine (ZANTAC) 150 MG capsule   vomiting x 3 after meals possibly in part due to constipation or GERD.  Does not appear to have gastroenteritis and has had no recurrence for almost 24 hours  She is neither eating nor drinking sufficiently however with no offer of explanation except that she is small and doesn't need to eat much.   I have emphasized to both  and wife that if she is unable to attend to self care of sustenance that she will require in patient care for rehydration and/or other assistance.    If vomiting persists once she begins to take in food and fluids she is to return to clinic  Advised to re establish with a new PCP  Was able to drink 10 oz of sprite while waiting for labs            Patient Instructions   At least 5 cans of sprite or 7 up or some such throughout the day  Juice also is good  Use miralax in the morning  Eat  potatoes , rice , applesauce and toast for the next 48 hours  After that eat a normal diet - small amounts through out the day- Six times- fruits and veggie and fish              DIVINA Guerra [facility]

## 2019-01-28 NOTE — PATIENT INSTRUCTIONS
At least 5 cans of sprite or 7 up or some such throughout the day  Juice also is good  Use miralax in the morning  Eat potatoes , rice , applesauce and toast for the next 48 hours  After that eat a normal diet - small amounts through out the day- Six times- fruits and veggie and fish

## 2019-01-28 NOTE — LETTER
[place] [address]  [address]  [place]  [address]  Tel: [phone]    January 29, 2019    Polly De La Torre  [address] [place] [address] [address]  [facility] [id number]        Dear Ms. De La Torre,    Your blood and urine tests do not give us any indication as to why you have had the episodes of vomiting.  I do think taking the zantac pills will help and as we discussed at length you must keep up your fluid intake and eat small portions of food throughout the day.   Please follow up in clinic if your symptoms persist, Polly.     If you have any further questions or problems, please contact our office.      Sincerely,    Ester Kelly NP/ Katerin Hurtado, CMA  Results for orders placed or performed in visit on 01/28/19   *UA reflex to Microscopic and Culture ([facility] (except [place] and [place])   Result Value Ref Range    Color Urine Yellow     Appearance Urine Clear     Glucose Urine Negative NEG^Negative mg/dL    Bilirubin Urine Moderate (A) NEG^Negative    Ketones Urine Trace (A) NEG^Negative mg/dL    Specific Gravity Urine >1.030 1.003 - 1.035    Blood Urine Trace (A) NEG^Negative    pH Urine 5.0 5.0 - 7.0 pH    Protein Albumin Urine Trace (A) NEG^Negative mg/dL    Urobilinogen Urine 0.2 0.2 - 1.0 EU/dL    Nitrite Urine Negative NEG^Negative    Leukocyte Esterase Urine Small (A) NEG^Negative    Source Midstream Urine    Basic metabolic panel   Result Value Ref Range    Sodium 139 133 - 144 mmol/L    Potassium 4.5 3.4 - 5.3 mmol/L    Chloride 106 94 - 109 mmol/L    Carbon Dioxide 26 20 - 32 mmol/L    Anion Gap 7 3 - 14 mmol/L    Glucose 93 70 - 99 mg/dL    Urea Nitrogen 15 7 - 30 mg/dL    Creatinine 0.90 0.52 - 1.04 mg/dL    GFR Estimate 57 (L) >60 mL/min/[1.73_m2]    GFR Estimate If Black 66 >60 mL/min/[1.73_m2]    Calcium 9.2 8.5 - 10.1 mg/dL   CBC with platelets and differential   Result Value Ref Range    WBC 8.1 4.0 - 11.0 10e9/L    RBC Count 5.08 3.8 - 5.2  10e12/L    Hemoglobin 16.1 (H) 11.7 - 15.7 g/dL    Hematocrit 51.5 (H) 35.0 - 47.0 %     (H) 78 - 100 fl    MCH 31.7 26.5 - 33.0 pg    MCHC 31.3 (L) 31.5 - 36.5 g/dL    RDW 14.0 10.0 - 15.0 %    Platelet Count 181 150 - 450 10e9/L    % Neutrophils 70.2 %    % Lymphocytes 19.6 %    % Monocytes 8.4 %    % Eosinophils 1.6 %    % Basophils 0.2 %    Absolute Neutrophil 5.7 1.6 - 8.3 10e9/L    Absolute Lymphocytes 1.6 0.8 - 5.3 10e9/L    Absolute Monocytes 0.7 0.0 - 1.3 10e9/L    Absolute Eosinophils 0.1 0.0 - 0.7 10e9/L    Absolute Basophils 0.0 0.0 - 0.2 10e9/L    Diff Method Automated Method    Lipase   Result Value Ref Range    Lipase 82 73 - 393 U/L   Urine Microscopic   Result Value Ref Range    WBC Urine 0 - 5 OTO5^0 - 5 /HPF    RBC Urine O - 2 OTO2^O - 2 /HPF    Squamous Epithelial /LPF Urine Few FEW^Few /LPF               Enclosure: Lab Results

## 2019-01-29 NOTE — RESULT ENCOUNTER NOTE
Your blood and urine tests do not give us any indication as to why you have had the episodes of vomiting.  I do think taking the zantac pills will help and as we discussed at length you must keep up your fluid intake and eat small portions of food throughout the day.   Please follow up in clinic if your symptoms persist, Polly.

## 2019-04-25 ENCOUNTER — TELEPHONE (OUTPATIENT)
Dept: FAMILY MEDICINE | Facility: CLINIC | Age: 84
End: 2019-04-25

## 2019-04-25 DIAGNOSIS — E78.5 HYPERLIPIDEMIA LDL GOAL <130: ICD-10-CM

## 2019-04-25 NOTE — TELEPHONE ENCOUNTER
"simvastatin (ZOCOR) 20 MG tablet  Last Written Prescription Date:  3/21/18  Last Fill Quantity: 90,  # refills: 3   Last office visit: 1/28/2019 with prescribing provider:  Monika    Future Office Visit:        Requested Prescriptions   Pending Prescriptions Disp Refills     simvastatin (ZOCOR) 20 MG tablet [Pharmacy Med Name: SIMVASTATIN TABS 20MG] 90 tablet 3     Sig: TAKE 1 TABLET AT BEDTIME       Statins Protocol Passed - 4/25/2019  2:26 AM        Passed - LDL on file in past 12 months     Recent Labs   Lab Test 05/23/18  0951   LDL 72             Passed - No abnormal creatine kinase in past 12 months     No lab results found.             Passed - Recent (12 mo) or future (30 days) visit within the authorizing provider's specialty     Patient had office visit in the last 12 months or has a visit in the next 30 days with authorizing provider or within the authorizing provider's specialty.  See \"Patient Info\" tab in inbasket, or \"Choose Columns\" in Meds & Orders section of the refill encounter.              Passed - Medication is active on med list        Passed - Patient is age 18 or older        Passed - No active pregnancy on record        Passed - No positive pregnancy test in past 12 months          "

## 2019-04-26 RX ORDER — SIMVASTATIN 20 MG
TABLET ORAL
Qty: 90 TABLET | Refills: 0 | Status: SHIPPED | OUTPATIENT
Start: 2019-04-26 | End: 2019-05-10

## 2019-04-26 NOTE — TELEPHONE ENCOUNTER
Called and informed Pt and offered to schedule with Dr. Christopher.  Pt would like a female provider.    Dr. Canas can you accept this patient?

## 2019-04-26 NOTE — TELEPHONE ENCOUNTER
Approved #90 and note to call to establish with new PCP.     TC- if able can you please call to assist with establishment of care?     Mariah GROVES RN

## 2019-05-10 ENCOUNTER — OFFICE VISIT (OUTPATIENT)
Dept: FAMILY MEDICINE | Facility: CLINIC | Age: 84
End: 2019-05-10
Payer: MEDICARE

## 2019-05-10 VITALS
OXYGEN SATURATION: 96 % | TEMPERATURE: 96.5 F | DIASTOLIC BLOOD PRESSURE: 62 MMHG | SYSTOLIC BLOOD PRESSURE: 108 MMHG | BODY MASS INDEX: 21.89 KG/M2 | WEIGHT: 111.5 LBS | HEIGHT: 60 IN | HEART RATE: 85 BPM

## 2019-05-10 DIAGNOSIS — E53.8 VITAMIN B12 DEFICIENCY (NON ANEMIC): ICD-10-CM

## 2019-05-10 DIAGNOSIS — Z76.89 ESTABLISHING CARE WITH NEW DOCTOR, ENCOUNTER FOR: Primary | ICD-10-CM

## 2019-05-10 DIAGNOSIS — K21.9 GASTROESOPHAGEAL REFLUX DISEASE, ESOPHAGITIS PRESENCE NOT SPECIFIED: ICD-10-CM

## 2019-05-10 DIAGNOSIS — E78.5 HYPERLIPIDEMIA LDL GOAL <130: ICD-10-CM

## 2019-05-10 PROCEDURE — 99214 OFFICE O/P EST MOD 30 MIN: CPT | Performed by: INTERNAL MEDICINE

## 2019-05-10 RX ORDER — SIMVASTATIN 20 MG
20 TABLET ORAL AT BEDTIME
Qty: 90 TABLET | Refills: 3 | Status: SHIPPED | OUTPATIENT
Start: 2019-05-10 | End: 2019-01-01

## 2019-05-10 ASSESSMENT — MIFFLIN-ST. JEOR: SCORE: 862.26

## 2019-05-10 NOTE — PROGRESS NOTES
"  SUBJECTIVE:   Polly De La Torre is a 87 year old female who presents to clinic today for the following   health issues:      New Patient/Transfer of Care  HTN  GERD  Vitamin B12 deficiency    HPI:   Patient Polly De La Torre is a very pleasant 87 year old female with history of HTN, GERD, and vitamin B12 deficiency who presents to Internal Medicine clinic today to establish care and for follow up of multiple concerns including medication refill of the Zocor cholesterol medication. Regarding the patient's hyperlipidemia, the patient is due for a refill of the Zocor cholesterol medication for hyperlipidemia treatment at this time. Regarding the patient's chronic GERD, the patient reports that her GERD symptoms are well controlled without requiring the Zantac medication. She is requesting that the Zantac medication be discontinued at this time. No chest pain, headaches, fever or chills.           Current Medications:     Current Outpatient Medications   Medication Sig Dispense Refill     aspirin 81 MG tablet Take 1 tablet (81 mg) by mouth daily       Cyanocobalamin (B-12) 500 MCG TABS Take 500 mcg by mouth daily 1 tablet 0     simvastatin (ZOCOR) 20 MG tablet Take 1 tablet (20 mg) by mouth At Bedtime 90 tablet 3         Allergies:      Allergies   Allergen Reactions     Cetirizine      Made her so tired after taking doesn't want to take again,just can't live that way([employer])     Darvon [Propoxyphene]      Methadone      DARVON  ---  \"crawling\" sensation             Past Medical History:     Past Medical History:   Diagnosis Date     Aortic regurgitation 2015     Aortic valve disorders      Choledocholithiasis     s/p ERCP x 3 2011, ERCP Jan 2014, Dr. Krishnan     Esophageal reflux      Gallstone pancreatitis      Osteoarthrosis, unspecified whether generalized or localized, unspecified site      Osteopenia          Past Surgical History:     Past Surgical History:   Procedure Laterality Date     C " NONSPECIFIC PROCEDURE  age 16    Appy     C NONSPECIFIC PROCEDURE      D&C x 2     CHOLECYSTECTOMY  1995    [facility]         Family Medical History:     Family History   Problem Relation Age of Onset     Hypertension Father      Cerebrovascular Disease Father      Allergies Son      Prostate Cancer No family hx of      Cancer - colorectal No family hx of          Social History:     Social History     Socioeconomic History     Marital status:      Spouse name: Not on file     Number of children: Not on file     Years of education: Not on file     Highest education level: Not on file   Occupational History     Not on file   Social Needs     Financial resource strain: Not on file     Food insecurity:     Worry: Not on file     Inability: Not on file     Transportation needs:     Medical: Not on file     Non-medical: Not on file   Tobacco Use     Smoking status: Never Smoker     Smokeless tobacco: Never Used   Substance and Sexual Activity     Alcohol use: Yes     Alcohol/week: 0.0 oz     Comment: rarely     Drug use: No     Sexual activity: Yes     Partners: Male   Lifestyle     Physical activity:     Days per week: Not on file     Minutes per session: Not on file     Stress: Not on file   Relationships     Social connections:     Talks on phone: Not on file     Gets together: Not on file     Attends [employer] service: Not on file     Active member of club or organization: Not on file     Attends meetings of clubs or organizations: Not on file     Relationship status: Not on file     Intimate partner violence:     Fear of current or ex partner: Not on file     Emotionally abused: Not on file     Physically abused: Not on file     Forced sexual activity: Not on file   Other Topics Concern     Parent/sibling w/ CABG, MI or angioplasty before 65F 55M? Not Asked   Social History Narrative     with 3 kids           Review of System:     Constitutional: Negative for fever or chills  Skin: Negative for  rashes  Ears/Nose/Throat: Negative for nasal congestion, sore throat  Respiratory: No shortness of breath, dyspnea on exertion, cough, or hemoptysis  Cardiovascular: Negative for chest pain  Gastrointestinal: Negative for nausea, vomiting, positive for GERD  Genitourinary: Negative for dysuria, hematuria  Musculoskeletal: Negative for myalgias  Neurologic: Negative for headaches  Psychiatric: Negative for depression, anxiety  Hematologic/Lymphatic/Immunologic: Negative  Endocrine: positive for vitamin B12 deficiency  Behavioral: Negative for tobacco use       Physical Exam:   /62 (BP Location: Left arm, Patient Position: Sitting, Cuff Size: Adult Regular)   Pulse 85   Temp 96.5  F (35.8  C) (Oral)   Ht 1.524 m (5')   Wt 50.6 kg (111 lb 8 oz)   SpO2 96%   BMI 21.78 kg/m      GENERAL: alert and no distress  EYES: eyes grossly normal to inspection, and conjunctivae and sclerae normal  HENT: Normocephalic atraumatic. Nose and mouth without ulcers or lesions  NECK: supple  RESP: lungs clear to auscultation   CV: regular rate and rhythm, normal S1 S2  LYMPH: no peripheral edema   ABDOMEN: nondistended  MS: no gross musculoskeletal defects noted  SKIN: no suspicious lesions or rashes  NEURO: Alert & Oriented x 3.   PSYCH: mentation appears normal, affect normal        Diagnostic Test Results:     Diagnostic Test Results:  Results for orders placed or performed in visit on 01/28/19   *UA reflex to Microscopic and Culture ([facility] (except [place] and [place])   Result Value Ref Range    Color Urine Yellow     Appearance Urine Clear     Glucose Urine Negative NEG^Negative mg/dL    Bilirubin Urine Moderate (A) NEG^Negative    Ketones Urine Trace (A) NEG^Negative mg/dL    Specific Gravity Urine >1.030 1.003 - 1.035    Blood Urine Trace (A) NEG^Negative    pH Urine 5.0 5.0 - 7.0 pH    Protein Albumin Urine Trace (A) NEG^Negative mg/dL    Urobilinogen Urine 0.2 0.2 - 1.0 EU/dL    Nitrite Urine  Negative NEG^Negative    Leukocyte Esterase Urine Small (A) NEG^Negative    Source Midstream Urine    Basic metabolic panel   Result Value Ref Range    Sodium 139 133 - 144 mmol/L    Potassium 4.5 3.4 - 5.3 mmol/L    Chloride 106 94 - 109 mmol/L    Carbon Dioxide 26 20 - 32 mmol/L    Anion Gap 7 3 - 14 mmol/L    Glucose 93 70 - 99 mg/dL    Urea Nitrogen 15 7 - 30 mg/dL    Creatinine 0.90 0.52 - 1.04 mg/dL    GFR Estimate 57 (L) >60 mL/min/[1.73_m2]    GFR Estimate If Black 66 >60 mL/min/[1.73_m2]    Calcium 9.2 8.5 - 10.1 mg/dL   CBC with platelets and differential   Result Value Ref Range    WBC 8.1 4.0 - 11.0 10e9/L    RBC Count 5.08 3.8 - 5.2 10e12/L    Hemoglobin 16.1 (H) 11.7 - 15.7 g/dL    Hematocrit 51.5 (H) 35.0 - 47.0 %     (H) 78 - 100 fl    MCH 31.7 26.5 - 33.0 pg    MCHC 31.3 (L) 31.5 - 36.5 g/dL    RDW 14.0 10.0 - 15.0 %    Platelet Count 181 150 - 450 10e9/L    % Neutrophils 70.2 %    % Lymphocytes 19.6 %    % Monocytes 8.4 %    % Eosinophils 1.6 %    % Basophils 0.2 %    Absolute Neutrophil 5.7 1.6 - 8.3 10e9/L    Absolute Lymphocytes 1.6 0.8 - 5.3 10e9/L    Absolute Monocytes 0.7 0.0 - 1.3 10e9/L    Absolute Eosinophils 0.1 0.0 - 0.7 10e9/L    Absolute Basophils 0.0 0.0 - 0.2 10e9/L    Diff Method Automated Method    Lipase   Result Value Ref Range    Lipase 82 73 - 393 U/L   Urine Microscopic   Result Value Ref Range    WBC Urine 0 - 5 OTO5^0 - 5 /HPF    RBC Urine O - 2 OTO2^O - 2 /HPF    Squamous Epithelial /LPF Urine Few FEW^Few /LPF       ASSESSMENT/PLAN:       (Z76.89) Establishing care with new doctor, encounter for  (primary encounter diagnosis)  (E78.5) Hyperlipidemia LDL goal <130  Comment: stable on current Zocor medication. Patient is due for a refill of the Zocor cholesterol medication at this time.  Plan: simvastatin (ZOCOR) 20 MG tablet      (E53.8) Vitamin B12 deficiency (non anemic)  Comment: chronic vitamin B12 deficiency, patient is compliant with daily vitamin B12  supplementation medication  Plan: continue the daily vitamin B12 supplementation medication going forward.      (K21.9) Gastroesophageal reflux disease, esophagitis presence not specified  Comment: stable without the Zantac medication  Plan: I have discontinued the patient's previous Zantac medication due to non-use at this time.        Follow Up Plan:     Patient is instructed to return to Internal Medicine clinic for follow-up visit in 6 months.        Tarsha Merritt MD  Internal Medicine  [facility]

## 2019-06-09 PROBLEM — I48.91 ATRIAL FIBRILLATION WITH RVR (H): Status: ACTIVE | Noted: 2019-01-01

## 2019-06-09 NOTE — PROGRESS NOTES
06/09/19 1432   Quick Adds   Type of Visit Initial PT Evaluation   Living Environment   Lives With spouse   Living Arrangements condominium   Home Accessibility no concerns   Transportation Anticipated family or friend will provide   Living Environment Comment Pt reports she lives in a condo with her spouse, no stairs to negotiate   Self-Care   Usual Activity Tolerance good   Current Activity Tolerance fair   Regular Exercise No   Equipment Currently Used at Home none   Activity/Exercise/Self-Care Comment Pt does not do any formal exercise but is normally independent and active without use of assistive device   Functional Level Prior   Ambulation 0-->independent   Transferring 0-->independent   Fall history within last six months yes   Number of times patient has fallen within last six months 3   Prior Functional Level Comment Pt reports being independent and active without assistive device, states she fell 3 times prior to coming to hospital, unable to describe circumstances of falls.   General Information   Onset of Illness/Injury or Date of Surgery - Date 06/08/19   Referring Physician Barbara Barrientos MD   Patient/Family Goals Statement To go home   Pertinent History of Current Problem (include personal factors and/or comorbidities that impact the POC) Pt is 87 year old female brought to ED by family for evaluation of weakness and falls. Pt found to have UTI and also a-fib with RVR.    Precautions/Limitations fall precautions   Cognitive Status Examination   Orientation orientation to person, place and time   Level of Consciousness alert   Cognitive Comment Pt following commands and answering orientation questions appropriately but appearing confused and forgetful at times.   Pain Assessment   Patient Currently in Pain No   Integumentary/Edema   Integumentary/Edema no deficits were identifed   Posture    Posture Forward head position   Range of Motion (ROM)   ROM Comment B LE ROM WFL   Strength  "  Strength Comments B LE strength 3/5, no focal weakness noted   Bed Mobility   Bed Mobility Comments CGA, increased time needed   Transfer Skills   Transfer Comments CGA   Gait   Gait Comments Amb 20' with IV pole and CGA   Balance   Balance Comments Fair- unsteady with standing dynamic tasks but no overt LOB   Sensory Examination   Sensory Perception Comments Denies numbness/tingling   General Therapy Interventions   Planned Therapy Interventions balance training;bed mobility training;gait training;strengthening;transfer training;home program guidelines;progressive activity/exercise   Clinical Impression   Criteria for Skilled Therapeutic Intervention yes, treatment indicated   PT Diagnosis Impaired gait   Influenced by the following impairments Decreased strength, decreased balance, decreased activity tolerance   Functional limitations due to impairments Decreased ability to safely participate in daily tasks   Clinical Presentation Stable/Uncomplicated   Clinical Presentation Rationale Current presentation, [facility]   Clinical Decision Making (Complexity) Low complexity   Therapy Frequency` daily   Predicted Duration of Therapy Intervention (days/wks) 3 days   Anticipated Equipment Needs at Discharge front wheeled walker   Anticipated Discharge Disposition Home with Assist   Risk & Benefits of therapy have been explained Yes   Patient, Family & other staff in agreement with plan of care Yes   [facility] [employer] \"6 Clicks\"   2016, Trustees of [facility], under license to [employer].  All rights reserved.   6 Clicks Short Forms Basic Mobility Inpatient Short Form   [facility] AM-PAC  \"6 Clicks\" V.2 Basic Mobility Inpatient Short Form   1. Turning from your back to your side while in a flat bed without using bedrails? 4 - None   2. Moving from lying on your back to sitting on the side of a flat bed without using bedrails? 3 - A Little   3. Moving to and from a bed to a chair (including a " wheelchair)? 3 - A Little   4. Standing up from a chair using your arms (e.g., wheelchair, or bedside chair)? 3 - A Little   5. To walk in hospital room? 3 - A Little   6. Climbing 3-5 steps with a railing? 3 - A Little   Basic Mobility Raw Score (Score out of 24.Lower scores equate to lower levels of function) 19   Total Evaluation Time   Total Evaluation Time (Minutes) 10

## 2019-06-09 NOTE — PHARMACY
Prescriber Notification Note    The pharmacist has communicated with this patient's provider regarding a concern or therapy recommendation.    Notified Person:  Dr Humphrey  Date/Time of Notification:  6/9 at 1345  Interaction: Face to face  Concern/Recommendation: apixaban ordered per hospitalist to start at 2100. Heparin d/c'd now.  COntinue heparin until 2100?      Comments/Additional Details: yes continue heparin until apixaban starts.     María Dominguez, ApoloniaD

## 2019-06-09 NOTE — CONSULTS
Consult Date:  06/09/2019      REASON FOR CONSULTATION:  New onset atrial fibrillation with rapid ventricular rates.      HISTORY OF PRESENT ILLNESS:  Mrs. De La Torre is an 87-year-old female with a background history of dyslipidemia, esophageal reflux disease, prior history of gallstone pancreatitis and left ventricular hypertrophy with dynamic obstruction noted on her most recent transthoracic echocardiogram.      She presents here to [facility] following onset of acute weakness leading to multiple recurrent falls.  Apart from sudden perception of weakness, she denies any other concomitant symptoms prior to her fall events.  She did not sustain any major injuries with her falls.  She denies co-existing chest discomfort, shortness of breath, palpitations, lightheadedness.  She denies overt heart failure syndrome.  She denies any exertional intolerance.      When she presented to the Emergency Department due to the falls, and she was noted to have atrial fibrillation with rapid ventricular rates.  She was subsequently initiated on IV diltiazem and was admitted to the Hospitalist Service.  She was also initiated on IV heparin.  Since her arrival to the Hospital Floor Service, she has converted back to normal sinus rhythm.      Vital signs show that her hemodynamics are currently well controlled.  CBC reveals a hemoglobin of 15.3, white blood cell count 7.5, platelets 231.  Creatinine is 0.76.  Electrolytes are unremarkable.      Troponin is elevated, index troponin being 0.781, decreasing to 0.484, decreasing further to 0.332.      Review of the 12-lead ECG demonstrates atrial fibrillation with rapid ventricular rates with subtle ST segment depressions with lateral limb lead T-wave inversions.      Latest transthoracic echocardiogram demonstrated a global left ventricular hypertrophy with a sigmoid septum with an LVEF of 55% to 60%.  No discrete regional wall motion abnormalities, but identifiable  left ventricular outflow tract obstruction and mild mitral stenosis (mean diastolic gradient of 5) and moderate aortic insufficiency.  Current transthoracic echocardiogram is pending.      SOCIAL HISTORY:  The patient is , lives independently with spouse.  The patient is a never smoker.  She uses occasional alcohol.  No illicit drug use.      FAMILY HISTORY:  Notable for cerebrovascular disease and hypertension.      REVIEW OF SYSTEMS:  Negative, except as what is already noted in the HPI.      PHYSICAL EXAMINATION:   GENERAL:  A well-appearing female, who appears younger than stated age, sensorium clear, cognition very much intact, relaxed, eating lunch at her bedside, cooperative.   HEENT:  No major abnormalities.   VESSELS:  Non-elevated JVP.   LUNGS:  Clear to auscultation bilaterally.     CARDIOVASCULAR:  No carotid bruits.  Normal full carotid impulses.  Positive mid peaking systolic murmur, loudest at the cardiac base.  No diastolic murmurs noted.  Regular rhythm.   ABDOMEN:  Soft, nontender, nondistended.   EXTREMITIES:  No edema.  Normal perfusion.   SKIN:  Dry.     NEUROLOGIC:  The patient is neurologically intact.      ASSESSMENT AND PLAN:   1.  Episodic weakness.   2.  Atrial fibrillation with rapid ventricular rates, resolved, new.   3.  Valvular heart disease including mild mitral stenosis and dynamic left ventricular outflow tract obstruction noted on 2017 echocardiogram.   4.  Left ventricular hypertrophy.   5.  Dyslipidemia.   6.  Gastroesophageal reflux disease.   7.  History of gallstone pancreatitis.   8.  Elevated troponin, likely representative of demand ischemia      Mrs. De La Torre arrives to [facility] following the onset of diffuse weakness spells.  Workup thus far has disclosed a declining troponin, as well as atrial fibrillation with rapid ventricular rates (now resolved).  I suspect that she poorly tolerates this rapid tachyarrhythmia.  Potential explanations for  this would include her mitral stenosis, as well as dynamic left ventricular outflow tract obstruction.  I suspect her blood pressure declined and/or left-sided filling pressures increase during these rapid ventricular rates, thereby perhaps serving as an explanation for her weakness/falls.      Fortunately, she is now spontaneously converted out of atrial fibrillation.  She does not carry any active symptoms at this point, and an echocardiogram is currently pending.  I am interested in evaluating her for the presence of worsened left ventricular outflow tract obstruction since her latest transthoracic echocardiogram.  She may benefit from initiation of beta blockade therapy to lessen this while in sinus rhythm.  Beta blockade therapy may also be of benefit should she redevelop atrial fibrillation.      In addition, given the new onset atrial fibrillation, there should be consideration for initiation of chronic anticoagulation.  She does carry risk factors for non-valvular atrial fibrillation, but she also has a known mitral stenosis to which she may be considered to have valvular atrial fibrillation.  I think it is reasonable to pursue anticoagulation.  The patient would like to discuss this further with her  present before committing to this.  For the time being, I think it is reasonable that we continue IV heparinization until further determination of what her long-term anticoagulation status will be is decided upon.      For her declining troponin, I think this is a reflection of demand ischemia that occurred upon onset of atrial fibrillation with rapid ventricular rates.  She probably has underlying coronary disease.  Fortunately, she is without active symptoms and she is without an immediate need to pursue further evaluation with coronary angiography.  She has received a loading dose of aspirin and continues on aspirin 81 mg daily.  I think this strategy is reasonable for the time being.      Thank you  for this consultation.  We will continue to follow along.  If there are any ongoing questions or concerns, feel free to contact the Cardiology Consult Service.         BETO DIALLO MD             D: 2019   T: 2019   MT: SULTANA      Name:     DELFINO LAUREANO   MRN:      [id number]        Account:       [id number]   :      [id number]           Consult Date:  2019      Document: [id number]

## 2019-06-09 NOTE — PROGRESS NOTES
RECEIVING UNIT ED HANDOFF REVIEW    ED Nurse Handoff Report was reviewed by: Alycia Taylor on June 9, 2019 at 12:04 AM

## 2019-06-09 NOTE — H&P
Admitted:     06/08/2019     DATE OF SERVICE: 06/08/2019     CHIEF COMPLAINT:  Recurrent falls and weakness.      HISTORY OF PRESENT ILLNESS:  Had been obtained from the patient, who is a relatively good historian.  Her  was present at the time of my examination and provided helpful information.  I also discussed with her son, who was present in the ER as well.  I also discussed with ER attending, and I reviewed her chart.      Mrs. Polly De La Torre is a very pleasant 87-year-old female with a past medical history of valvular disease, questionable hypertrophic cardiomyopathy, dyslipidemia, gallstone pancreatitis and a mild cognitive impairment, who was brought in for evaluation of weakness and recurrent falls.      The patient lives in her condo with her .  She apparently has mild cognitive impairment, but she is awake, alert, oriented x 3 at the time of my examination.  She does not use any walker or cane for ambulation.  She seems to be doing well at baseline.  She reports that today she felt tired and weak, and she had 3 falls today.  She does not have history of falls.  First fall happened earlier today around 4:30 while she was at home.  She just felt weak and she fell to the ground.  She was not able to stand up by herself.  Her  helped her get up.  She went to the [facility], where she had another fall, again felt weak in her legs.  She cannot explain her falls.  She had a third fall after she came back home from [facility].  The patient denies any preceding symptoms.  She denies feeling lightheaded or dizzy.  She denies any recent chest pain, no shortness of breath.  She denies any palpitations.  She denies any headaches, blurry vision, numbness in her arms or legs.  Her  witnessed the fall, and she stated that she did not lose her consciousness with any of these falls, and there was no head trauma.  Upon further questioning, she denies any recent fevers, no upper respiratory  infection symptoms, no nausea, no vomiting, no abdominal pain, no diarrhea, no constipation, no leg swelling.  She did not spent time outside today, so she does not think that she is dehydrated.  She stated that she tries to keep enough fluids.  She denies dysuria or urinary symptoms at home, although she was noticed to have to urinate more often since she arrived in ER, which she attributes to the fact that she was given IV fluids.      In ER, she was seen by Dr. Ocampo.  Her initial vitals showed a blood pressure of 156/60.  Heart rate initially was in 80s, temperature 98.5, respiratory rate 18, oxygen saturation 97% on room air.  Initially, she was in normal sinus rhythm, but after she came back from the bathroom, she was noted to be tachycardic.  EKG and telemetry confirmed atrial fibrillation with RVR with a heart rate in the 130s, which is new for her.      She did have basic blood work done in ER, which showed a BMP which was quite unremarkable with a sodium 141, potassium 5.3, chloride 108, bicarbonate 29, BUN 18, creatinine 0.99.  Her calcium was 9.6, anion gap of 4, albumin 3.4, total protein 7.7, total bilirubin 0.3, alkaline phosphatase 63, ALT 11, AST 8, lipase 177.  Glucose was 91.  White blood cells 7.5, hemoglobin 15.3, hematocrit 46.4, platelet count 131,000.  Her UA was abnormal with white blood cells 34, large leukocyte esterase.  The urine culture was sent to the lab.  Her initial EKG showed normal sinus rhythm with first-degree AV block at a rate of 75 beats per minute.  A repeat EKG showed atrial fibrillation with RVR at a rate of 150 beats per minute with some ST depression in anterolateral leads.  In ER, she received 1 dose of Cardizem 10 mg IV, which slowed down her heart rate to 100, but later on, her heart rate went up to 130s.  The plan was to start her on a Cardizem drip in ER as well as a heparin drip.  She was also given 1 dose of aspirin 325 mg bolus of normal saline as well as 1  dose of ceftriaxone for suspected UTI, and Hospitalist Service was called regarding the admission.      PAST MEDICAL HISTORY:   1.  Valvular disease.  Echocardiogram in 09/2017 showed mild mitral stenosis and mild to moderate aortic insufficiency.  Echocardiogram findings also showed findings suggestive of dynamic LVOT obstruction consistent with a hypertrophic cardiomyopathy.  She had been seen by Cardiology in the past for these findings.  She had a cardiac MRI in 09/2015, which showed findings consistent with a small LV cavity with hyperdynamic function in the setting of a mildly hypertrophied a sigmoid septum secondary to age.  Her cardiac MRI was reviewed by Cardiology, and they did not feel that her echocardiogram findings are consistent with a hypertrophic cardiomyopathy.  It was recommended to repeat echocardiogram in 1 year.   2.  Gallstone pancreatitis.   3.  Dyslipidemia.     4.  Gastroesophageal reflux disease.      PAST SURGICAL HISTORY:   Past Surgical History:   Procedure Laterality Date     C NONSPECIFIC PROCEDURE  age 16     C NONSPECIFIC PROCEDURE       CHOLECYSTECTOMY  1995        SOCIAL HISTORY:  She never smoked.  She denies alcohol drinking.  She denies illicit drug abuse.  [facility]      FAMILY HISTORY:    Family History     Problem (# of Occurrences) Relation (Name,Age of Onset)    Allergies (1) Son    Cerebrovascular Disease (1) Father    Hypertension (1) Father       Negative family history of: Prostate Cancer, Cancer - colorectal           MEDICATIONS PRIOR TO ADMISSION:    Prior to Admission medications    Medication Sig Last Dose Taking? Auth Provider   aspirin 81 MG tablet Take 1 tablet (81 mg) by mouth daily 6/7/2019 Yes Sherrie Christianson PA-C   simvastatin (ZOCOR) 20 MG tablet Take 1 tablet (20 mg) by mouth At Bedtime 6/7/2019 Yes Tarsha Merritt MD           ALLERGIES:    Allergies   Allergen Reactions     Cetirizine      Made her so tired after  "taking doesn't want to take again,just can't live that way([employer])     Darvon [Propoxyphene]      Methadone      DARVON  ---  \"crawling\" sensation         REVIEW OF SYSTEMS:  A 10-point review of systems was conducted, and it was negative except for pertinent positives mentioned in History of Present Illness.      PHYSICAL EXAMINATION:   VITAL SIGNS:  Blood pressure at the time of my examination was 156/75, heart rate 125, respiratory rate 18, oxygen saturation 96% on room air, temperature in ER 98.5.   GENERAL:  The patient is awake, alert, no acute distress at the time of my examination.   HEENT:  Normocephalic, atraumatic.  Pupils are equally round and reactive to light.  Oral mucosa is mildly dry.   NECK:  Supple.  No cervical lymphadenopathy, no thyromegaly.   CHEST:  There is bilateral air entry, no wheezing, no rales, no crackles.   CARDIOVASCULAR:  There is irregularly irregular heart rate with a systolic murmur 2/6 intensity, precordial area.  No rubs.   ABDOMEN:  Soft, nontender, nondistended.  Bowel sounds are present.   EXTREMITIES:  There is no leg swelling, no calf tenderness, 2+ peripheral pulses are palpable.   SKIN:  Intact, no rash, no cyanosis.   NEUROLOGIC:  The patient is awake, alert, oriented to self, place and time.  There are no focal neurological deficits.   PSYCHIATRIC:  Normal mood, normal affect.   MUSCULOSKELETAL:  She moves all extremities freely.  There are no obvious joint deformities.      LABORATORY:  Reviewed and dictated above.      ASSESSMENT:  Ms. De La Torre is a very pleasant 87-year-old female with past medical history of valvular disease, dyslipidemia, gastroesophageal reflux disease and gallstone pancreatitis, who came in for evaluation of weakness and recurrent falls.  She was found to have a urinary tract infection and also to be in atrial fibrillation with rapid ventricular response.      PLAN:   1.  Atrial fibrillation with rapid ventricular rate.  This seems to be a " new diagnosis for the patient.  She does not have a history of atrial fibrillation.  The onset of atrial fibrillation is not clear.  She did have 3 falls today, which is unusual for her.  The only symptom that she reported was feeling weak, otherwise no reported dizziness, lightheadedness, palpitations or chest pain.  Her initial troponin was mildly elevated at 0.85, which is likely related to atrial fibrillation with RVR.  She was given 1 dose of Cardizem 10 mg IV in ER, which slowed down the heart rate a bit, but then heart rate went up to 130s, so she was started on Cardizem drip.  She was also started on a heparin drip.  The plan for now is to admit her to [facility].  She will be monitored on telemetry, will have serial troponin checked.  Will continue his Cardizem drip for now.  We will check TSH.  I also started her on a low-dose metoprolol 12.5 mg p.o. twice daily for now.  The dose can be further increased in order to wean her off Cardizem drip.  We will continue his heparin drip for now.  Her CHADS-VASc score is 3-4 for age and gender and possible hypertension.  She does not carry a diagnosis of hypertension.  She is not on any medications for this.  I had a brief discussion about anticoagulation therapy after discharge, such as warfarin versus new oral anticoagulation.  Cardiology consult was requested.   2.  Elevated troponins of 0.85.  This is likely demand ischemia in the setting of atrial fibrillation with RVR.  She denies any chest pain, no shortness of breath.  She does not have a known history of coronary artery disease.  We will monitor her on telemetry and up serial troponins.  She did receive 1 dose of aspirin in the ER.  She is currently on heparin drip.   3.  Urinary tract infection.  Her UA is abnormal.  Urine culture is pending at this time.  She was given 1 dose of ceftriaxone in ER.  Will continue ceftriaxone at this time and follow up urine cultures.   4.  Weakness/ recurrent falls.  She had 3  falls today, which is unusual for her.  She denies loss of consciousness.  She denies any preceding lightheadedness or dizziness.  No palpitations either.  This may be related to her UTI and/or atrial fibrillation with RVR.  We will treat as above.  We will continue some IV fluids, normal saline at 75 mL per hour at this time.  PT evaluation has been ordered.   5.  Dyslipidemia.  We will continue her cygrx-sj-trpusvtsy statin.   6.  History of mild cognitive impairment.  She lives at home with her .  She is awake, alert, oriented x 3 at the time of my examination.   7.  Deep venous thrombosis prophylaxis.  Heparin drip.   8.  Code status:  Discussed with the patient, and patient is full code.      DISPOSITION:  Admit as inpatient.  Anticipate at least a couple of days of hospitalization.         ERICKA HOPKINS MD             D: 2019   T: 2019   MT: [id number]      Name:     DELFINO LAUREANO   MRN:      [id number]        Account:      [id number]   :      1931        Admitted:     2019                   Document: [id number]       cc: Tarsha Merritt MD

## 2019-06-09 NOTE — ED NOTES
DATE:  6/8/2019   TIME OF RECEIPT FROM LAB:  10:34 PM  LAB TEST:  Trop  LAB VALUE:  0.85  RESULTS GIVEN WITH READ-BACK TO (PROVIDER):  Hal Ocampo, *  TIME LAB VALUE REPORTED TO PROVIDER:   10:35 PM

## 2019-06-09 NOTE — ED NOTES
Pt c/o altered gait starting today, reports falling x2, denies hitting head or LOC. Pt able to walk independently on arrival. A&)x4. Pt denies recent illness, urinary symptoms, hx of cardiac disease. Pt reports taking baby ASA and simvastatin daily. Small superficial abrasion noted to left lower forearm, no additional skin abnormalities noted. No swelling to b/l LE. Pt denies pain or inability to move extremities.

## 2019-06-09 NOTE — ED NOTES
DATE:  6/9/2019   TIME OF RECEIPT FROM LAB:  0.781  LAB TEST:  troponin  LAB VALUE:  0.781  RESULTS GIVEN WITH READ-BACK TO (PROVIDER):  Hal Ocampo, *  TIME LAB VALUE REPORTED TO PROVIDER:   0004

## 2019-06-09 NOTE — TELEPHONE ENCOUNTER
Neal () calls and says that he took his wife to the ER, as instructed to do, by a different FNA, and his wife is now admitted to the hospital. Neal says that he is just calling to say thank you to the FNAs for being there for him.   Reason for Disposition    [1] Follow-up call to recent contact AND [2] information only call, no triage required    Additional Information    Negative: [1] Caller is not with the adult (patient) AND [2] reporting urgent symptoms    Negative: Lab result questions    Negative: Medication questions    Negative: Caller can't be reached by phone    Negative: Caller has already spoken to PCP or another triager    Negative: RN needs further essential information from caller in order to complete triage    Negative: Requesting regular office appointment    Negative: [1] Caller requesting NON-URGENT health information AND [2] PCP's office is the best resource    Negative: Health Information question, no triage required and triager able to answer question    Negative: General information question, no triage required and triager able to answer question    Negative: Question about upcoming scheduled test, no triage required and triager able to answer question    Negative: [1] Caller is not with the adult (patient) AND [2] probable NON-URGENT symptoms    Protocols used: INFORMATION ONLY CALL-A-

## 2019-06-09 NOTE — ED PROVIDER NOTES
History     Chief Complaint:  Fall      HPI   Polly De La Torre is a 87 year old female who presents with fall. Per the nurses report, the patient has felt altered and seemed unsteady on her feet to her . The patient fell multiple times this evening but did not hit her head. She was able to walk into the hospital. The family is concerned because the patient is normally very active. No urinary symptoms, no leg swelling. The patient states that she felt well last night and well this morning and went out with her  for breakfast. She does not believe that the food made her sick. This evening the patient reports she fell three times. The first time she fell was at 4:30 PM just before going to [facility] where she says that she just went down. She denies any head trauma at that time. When they arrived at [facility] the patient felt so fatigued that she required assistance to get out of the car and to her seat. She second fall was shortly after she returned from [facility] around 6 PM. She denies hitting her head or mechanical mechanism of the fall. The third time she fell was shortly after the second fall. She states that she just sat down on the floor and felt fatigued.  The patient says that she has been sleeping well and has not done any recent strenuous activity.  She has not had any chest pain or palpitations.  The patient denies headache, chest pain, abdominal pain, numbness, pain in hips, dysuria, urgency, frequency, cold symptoms, nausea, chest ache, chest pressure, or chest tightness. The patient has never had a problems with low sodium levels, she has never had a heart attack, she does not have diabetes and has never had a stress test. Of note, the patient's  is concerned because the patient is normally very stable on her feet. Additionally, the son comments that he was talking with his father last night who mentioned that the patient was very tired last night.      Allergies:  Cetirizine  "  Darvon   Methadone     Medications:    Zocor  Aspirin    Past Medical History:    Aortic regurgitation   Aortic valve disorders   Choledocholithiasis   Esophageal reflux   Gallstone pancreatitis   Osteoarthrosis  Osteopenia    Past Surgical History:    Appendectomy   Cholecystectomy     Family History:    Hypertension   Cerebrovascular disease     Social History:  Smoking status: Never smoker  Alcohol use: Yes  Marital Status:   [2]       Review of Systems   Constitutional: Positive for fatigue.   Respiratory: Negative for chest tightness and shortness of breath.    Cardiovascular: Negative for chest pain.   Gastrointestinal: Negative for abdominal pain and nausea.   Genitourinary: Negative for dysuria, frequency and urgency.   Neurological: Negative for numbness and headaches.   All other systems reviewed and are negative.      Physical Exam     Patient Vitals for the past 24 hrs:   BP Temp Temp src Pulse Heart Rate Resp SpO2 Height Weight   06/08/19 2300 (!) 137/92 -- -- 98 107 17 96 % -- --   06/08/19 2254 -- -- -- -- 85 18 -- -- --   06/08/19 2253 122/54 -- -- 112 76 18 96 % -- --   06/08/19 2250 -- -- -- -- 163 18 -- -- --   06/08/19 2245 -- -- -- -- 158 18 96 % -- --   06/08/19 2240 -- -- -- -- 170 26 96 % -- --   06/08/19 2235 -- -- -- -- 165 22 98 % -- --   06/08/19 2230 -- -- -- -- 163 24 98 % -- --   06/08/19 2225 (!) 158/92 -- -- 149 135 18 96 % -- --   06/08/19 2215 156/75 -- -- 142 142 16 96 % -- --   06/08/19 2155 (!) 170/144 -- -- 125 138 16 96 % -- --   06/08/19 2130 -- -- -- -- 80 16 98 % -- --   06/08/19 2030 -- -- -- -- 77 16 96 % -- --   06/08/19 2005 152/62 -- -- 79 75 18 97 % -- --   06/08/19 1955 156/60 98.5  F (36.9  C) Oral -- 83 -- 97 % 1.473 m (4' 10\") 47.6 kg (105 lb)         Physical Exam  Physical Exam   General:  Sitting on bed with son and  at bedside.   HENT:  No obvious trauma to head  Right Ear:  External ear normal.   Left Ear:  External ear normal.   Nose:  Nose " normal.   Eyes:  Conjunctivae and EOM are normal. Pupils are equal, round, and reactive.   Neck: Normal range of motion. Neck supple. No tracheal deviation present.   CV:  Normal heart sounds. No murmur heard.  Pulm/Chest: Effort normal and breath sounds normal.   Abd: Soft. No distension. There is no tenderness. There is no rigidity, no rebound and no guarding.   M/S: Normal range of motion.   Neuro: Alert. GCS 15. CN II-XII Grossly intact, no pronator drift, normal finger-nose-finger, visual fields intact by confrontation. Muscle strength is +5 proximal and distal in the bilateral upper and lower extremities. No dysarthria. Normal palm up, palm down.  Skin: Skin is warm and dry. No rash noted. Not diaphoretic.   Psych: Normal mood and affect. Behavior is normal.       Emergency Department Course   ECG (21:03:04):  Rate 74 bpm. VT interval 224. QRS duration 72. QT/QTc 394/437. P-R-T axes 61 -9 61. Sinus rhythm with 1st degree AV block, Otherwise normal ECG, No significant change compared to EKG dated 9/15/17 Interpreted at 2105 by Hal Ocampo DO.    ECG (22:33:47):  Rate 157 bpm. VT interval *. QRS duration 70. QT/QTc 258/417. P-R-T axes * -10 110. Atrial fibrillation with rapid ventricular response, ST and T wave abnormality consider lateral ischemia, Abnormal ECG,  Interpreted at 2234 by Hal Ocampo DO    Laboratory:  Lipase: 177  CMP: GFR 51, (Creatinine 0.99)  CBC: WNL (WBC 7.5, HGB 15.3, )  2107: troponin I: 0.781    UA: Blood trace, leukocyte esterase large, WBC 34, Mucous present, o/w negative   Urine culture: pending    Interventions:  2251: Cardizem 10 mg IV, followed by Cardizem drip  2222: NaCl bolus 1100 ml IV  2209: Rocephin 1 g in 100 IV infusion    Emergency Department Course:  Past medical records, nursing notes, and vitals reviewed.  2005: I performed an exam of the patient and obtained history, as documented above.    IV inserted and blood drawn.    2241: I rechecked  the patient. Explained findings to the patient.    2238: Patient got up to go to the restroom. When she came back she was tachycardic, EKG shows Afib RVR, Patient has no history of this per her report or through chart review.     Findings and plan explained to the Patient who consents to admission.     2244: Discussed the patient with Dr. Barrientos, who will admit the patient to a cardiac tele bed for further monitoring, evaluation, and treatment.       Impression & Plan    Medical Decision Making:  Polly De La Torre is a very pleasant 87 year old year old patient who presents to the emergency department with concern of 3 falls.  The patient reports she feels fatigued.  Screening EKG shows a sinus rhythm and basic labs reveal a UTI.  The patient has no neurologic deficit to suggest stroke.  The patient denies any trauma from these falls.  She has no extremity pain and no tenderness on exam to suggest wrist, hip or other fracture.  She denies any chest pain or palpitations.  The patient was provided an initial fluid bolus.  Throughout her stay the patient had urinary frequency.  Although she does not have dysuria with this urinary frequency I believe the finding above does represent a urinary tract infection.  Urine culture added on. She was provided Rocephin.    During her ED course she went into atrial fibrillation with a rapid ventricular rate.  The troponin is elevated.  The patient denies any chest pain.  There are no significant ST changes to warrant Cath Lab activation based on her initial EKG or repeat EKG.  The patient was provided diltiazem for rate control which was effective for period of time but then she required a diltiazem drip to control the rate.  I reviewed the risk and benefit of initiating anticoagulation for her A. fib and after doing so the patient,  and son agreed.  The patient's chadsvasc2 score is 4, placing her at increased risk.  Heparin was initiated.  The patient was provided  aspirin.    Diagnosis:    ICD-10-CM    1. Urinary tract infection without hematuria, site unspecified N39.0 Troponin I     Comprehensive metabolic panel     Lipase     Urine Culture   2. Multiple falls R29.6    3. Fatigue, unspecified type R53.83    4. Atrial fibrillation with RVR (H) I48.91        Disposition:  Admitted to cardiac tele bed     Discharge Medications:     Medication List      There are no discharge medications for this visit.           Rosalio Valentine  6/8/2019    EMERGENCY DEPARTMENT    Scribe Disclosure:  Rosalio GARCIA, am serving as a scribe at 8:05 PM on 6/8/2019 to document services personally performed by Hal Ocampo DO based on my observations and the provider's statements to me.          Hal Ocampo DO  06/09/19 0028

## 2019-06-09 NOTE — ED NOTES
"[facility]  ED Nurse Handoff Report    ED Chief complaint: Fall      ED Diagnosis:   Final diagnoses:   Urinary tract infection without hematuria, site unspecified   Multiple falls   Fatigue, unspecified type   Atrial fibrillation with RVR (H)       Code Status: Full Code    Allergies:   Allergies   Allergen Reactions     Cetirizine      Made her so tired after taking doesn't want to take again,just can't live that way([employer])     Darvon [Propoxyphene]      Methadone      DARVON  ---  \"crawling\" sensation        Activity level - Baseline/Home:  Independent  Activity Level - Current:   Stand with Assist    Patient's Preferred language: english   Needed?: No    Isolation: No  Infection: Not Applicable  Bariatric?: No    Vital Signs:   Vitals:    06/08/19 2245 06/08/19 2250 06/08/19 2253 06/08/19 2254   BP:   122/54    Pulse:   112    Resp: 18 18 18 18   Temp:       TempSrc:       SpO2: 96%  96%    Weight:       Height:           Cardiac Rhythm: ,        Pain level:      Is this patient confused?: No   Does this patient have a guardian?  No         If yes, is there guardianship documents in the Epic \"Code/ACP\" activity?  N/A         Guardian Notified?  N/A  [place] - Suicide Severity Rating Scale Completed?  Yes  If yes, what color did the patient score?  White    Patient Report: Initial Complaint: Fall  Focused Assessment: Pt c/o altered gait starting today, reports falling x2, denies hitting head or LOC. Pt able to walk independently on arrival. A&)x4. Pt denies recent illness, urinary symptoms, hx of cardiac disease. Pt reports taking baby ASA and simvastatin daily. Small superficial abrasion noted to left lower forearm, no additional skin abnormalities noted. No swelling to b/l LE. Pt denies pain or inability to move extremities. Pt demonstrates urinary symptoms including inadequate emptying and frequency or urination. It is also noted on pt cardiac monitoring that pt was in Afib with " RVR, pt was asymptomatic  Tests Performed: lab, EKG  Abnormal Results:   Labs Ordered and Resulted from Time of ED Arrival Up to the Time of Departure from the ED   COMPREHENSIVE METABOLIC PANEL - Abnormal; Notable for the following components:       Result Value    GFR Estimate 51 (*)     GFR Estimate If Black 59 (*)     All other components within normal limits   ROUTINE UA WITH MICROSCOPIC - Abnormal; Notable for the following components:    Blood Urine Trace (*)     Leukocyte Esterase Urine Large (*)     WBC Urine 34 (*)     Mucous Urine Present (*)     All other components within normal limits   CBC WITH PLATELETS DIFFERENTIAL   TROPONIN I   LIPASE   URINE CULTURE AEROBIC BACTERIAL       Treatments provided: IV fluid, IV abx, IV cardizem    Family Comments: son and  at bedside    OBS brochure/video discussed/provided to patient/family: Yes              Name of person given brochure if not patient:               Relationship to patient:     ED Medications:   Medications   0.9% sodium chloride BOLUS (0 mLs Intravenous Stopped 6/8/19 2222)   cefTRIAXone (ROCEPHIN) 1 g vial to attach to  mL bag for ADULTS or NS 50 mL bag for PEDS (0 g Intravenous Stopped 6/8/19 2209)   diltiazem (CARDIZEM) injection 10 mg (10 mg Intravenous Given 6/8/19 2251)       Drips infusing?:  No    For the majority of the shift this patient was [place].   Interventions performed were .    Severe Sepsis OR Septic Shock Diagnosis Present: No    To be done/followed up on inpatient unit:      ED NURSE PHONE NUMBER: [phone]

## 2019-06-09 NOTE — PHARMACY-ADMISSION MEDICATION HISTORY
Admission medication history interview status for the 6/8/2019  admission is complete. See EPIC admission navigator for prior to admission medications     Medication history source reliability:Good    Actions taken by pharmacist (provider contacted, etc):None     Additional medication history information not noted on PTA med list :None    Medication reconciliation/reorder completed by provider prior to medication history? Yes     Time spent in this activity: 5 minutes    Prior to Admission medications    Medication Sig Last Dose Taking? Auth Provider   aspirin 81 MG tablet Take 1 tablet (81 mg) by mouth daily 6/7/2019 Yes Sherrie Christianson PA-C   simvastatin (ZOCOR) 20 MG tablet Take 1 tablet (20 mg) by mouth At Bedtime 6/7/2019 Yes Tarsha Merritt MD

## 2019-06-09 NOTE — PLAN OF CARE
Discharge Planner PT   Patient plan for discharge: Home  Current status: Pt is 87 year old female adm on 6/8/19 with weakness and multiple falls. At baseline, pt lives with spouse in condo, is independent and active without use of assistive device. PT evaluation completed and treatment initiated. Pt is CGA for bed mobility, difficulty maintaining balance at EOB, repeatedly losing balance backwards. Pt sit to stand with CGA. Pt able to ambulate in room with IV pole and CGA. Pt amb 60' in milian with IV pole and min assist, needing assist with turns and also with increased fatigue. Encouraged pt to use FWW in place of IV pole but pt stating she wanted to use IV pole at this time.  Barriers to return to prior living situation: Decreased balance, decreased activity tolerance, decreased strength  Recommendations for discharge: Home with assist especially for IADLs, possibly with ambulation depending on progress and willingness to use assistive device, home PT  Rationale for recommendations: Pt appears to be below baseline level of independent and active function without use of assistive device. Pt also demonstrates decreased safety awareness and decreased insight. Anticipate pt could return home with family to assist, may benefit from use assistive device to decrease fall risk. Will continue to follow for PT intervention during hospital stay.       Entered by: Elda Hope 06/09/2019 2:52 PM

## 2019-06-09 NOTE — TELEPHONE ENCOUNTER
reports that wife  (history of dementia) had sudden onset of weakness nd change in gait  , onset this afternoon   states she normally is sure on her feet and moves quickly bu tnow is weak, moves slow and needs support   States they went to [place] and needed assistance to walk   They came home and she went right to bed; heis very worried aboutthis sudden change   Triage protocol reviewed  Advised  Assessment in ED for  acute neurological change    understands and wlil take to ED @ [facility]   Lora Fernandes RN  [facility]        Reason for Disposition    [1] Weakness (i.e., paralysis, loss of muscle strength) of the face, arm / hand, or leg / foot on one side of the body AND [2] sudden onset AND [3] brief (now gone)    Additional Information    Negative: [1] SEVERE weakness (i.e., unable to walk or barely able to walk, requires support) AND [2] new onset or worsening    Negative: [1] Weakness (i.e., paralysis, loss of muscle strength) of the face, arm / hand, or leg / foot on one side of the body AND [2] sudden onset AND [3] present now    Negative: [1] Numbness (i.e., loss of sensation) of the face, arm / hand, or leg / foot on one side of the body AND [2] sudden onset AND [3] present now    Negative: [1] Loss of speech or garbled speech AND [2] sudden onset AND [3] present now    Negative: Difficult to awaken or acting confused (e.g., disoriented, slurred speech)    Negative: Sounds like a life-threatening emergency to the triager    Negative: Confusion, disorientation, or hallucinations is main symptom    Negative: Neck pain is main symptom (and having weakness, numbness, or tingling in arm / hand because of neck pain)    Negative: Back pain is main symptom (and having weakness, numbness, or tingling in leg because of back pain)    Negative: Hand pain is main symptom (and having mild weakness, numbness, or tingling in hand related to hand pain)    Negative: Dizziness is main symptom    Negative: Vision loss  or change is main symptom    Negative: Followed a head injury within last 3 days    Negative: Followed a neck injury within last 3 days    Negative: [1] Tingling in both hands and/or feet AND [2] breathing faster than normal AND [3] feels similar to prior panic attack or hyperventilation episode    Negative: Weakness in both sides of the body or weakness all over    Negative: Headache  (and neurologic deficit)    Negative: [1] Back pain AND [2] numbness (loss of sensation) in groin or rectal area    Negative: [1] Unable to urinate (or only a few drops) > 4 hours AND [2] bladder feels very full (e.g., palpable bladder or strong urge to urinate)    Negative: [1] Loss of control of bowel or bladder (i.e., incontinence) AND [2] new onset    Protocols used: NEUROLOGIC DEFICIT-A-AH

## 2019-06-09 NOTE — PROGRESS NOTES
Non billing PN    Patient admitted with multiple falls and found to be in afib with RVR and started on dilt gtt and iv heparin.    Echo shows:  Sinus rhythm was noted.  Left ventricular systolic function is normal.  Proximal septal thickening is noted.  The left atrium is mildly dilated.  There is mild to moderate (1-2+) aortic regurgitation.    Patient converted to NSR    Will place on eliis, await cardiology consultation.    Aleksey Briseno MD

## 2019-06-09 NOTE — PLAN OF CARE
Pt A&Ox4, VSS on RA, up Ax1 GB, tele afib CVR, echo done, cardiac diet, LS clear, denies pain, CMS intact, discharge TBD.

## 2019-06-09 NOTE — ED TRIAGE NOTES
Bilateral LE weakness and balance problems began today, fall x2.  Denies LOC/blood thinners.  Family present deny abnormalities with speech and facial apperance.  Patient does not use assistive device at home and lives with .  Family does mention in a side conversation that patient has a history of dementia.

## 2019-06-10 NOTE — PLAN OF CARE
Pt A&Ox4; VSS; in SR with 1st degree AVB. Echo done; No c/o chest pain or sob. Up with SBA. Tolerating PO. Plan to stop heparin gtt this evening at 2100 and start Eliquis. Will continue to monitor.

## 2019-06-10 NOTE — PLAN OF CARE
Pt alert, confused at times but redirectable.  Pt vitals remain stable and pt is in SR with 1st AVB.  No complaints of discomfort.  SBA to assist of one with transfers/ambulation.  Plan to discharge home today.  Heparin stopped last evening and eliquis started.

## 2019-06-10 NOTE — DISCHARGE INSTRUCTIONS
-The patient will discharge home with [facility] with PT.  [facility] will contact the patient directly to arrange the first visit.  [facility]'s phone number is [phone].

## 2019-06-10 NOTE — PROGRESS NOTES
"[facility]  Cardiology Progress Note         Assessment and Plan:     Atrial fibrillation with RVR (H)  Mod AI  Mild MS  \"acquired\" IHSS physiology (I reviewed echo and alcohol ablation possible but no sig gradient on current echo)  Mild Troponin with rapid afib    I spoke with pt and son  I rec:   amiodarone 200bid x 2 weeks then 200/day ( I discussed with pt/son the lung/liver/thyroid SE)  eliquis 2.5bid (done) stop ASA  I discussed risk of cva with vs without anticoag--pt said this is second af that she knows of but she didn't really feel she was out of rhythm it was the dizzy she noted  Stop bb with amio  Add ace for bp  lexiscan nuc gxt as outpt  Home today if ok with IM--cardio will see within next 1-2 weeks               Interval History:   {WANTS TO GO HOME TODAY              Medications:   Scheduled Meds:     apixaban ANTICOAGULANT  2.5 mg Oral BID     aspirin  81 mg Oral Daily     cefTRIAXone  1 g Intravenous Q24H     metoprolol tartrate  12.5 mg Oral BID     simvastatin  20 mg Oral At Bedtime     PRN Meds: acetaminophen, acetaminophen, melatonin, naloxone, ondansetron **OR** ondansetron, - MEDICATION INSTRUCTIONS -, potassium chloride, potassium chloride with lidocaine, potassium chloride, potassium chloride, potassium chloride, senna-docusate **OR** senna-docusate         Physical Exam:   Blood pressure 162/71, pulse 74, temperature 97.8  F (36.6  C), temperature source Oral, resp. rate 18, height 1.473 m (4' 10\"), weight 50.1 kg (110 lb 6.4 oz), SpO2 96 %, not currently breastfeeding.  Vitals:    19 1955 19 0606 06/10/19 0525   Weight: 47.6 kg (105 lb) 48.9 kg (107 lb 11.2 oz) 50.1 kg (110 lb 6.4 oz)       Intake/Output Summary (Last 24 hours) at 6/10/2019 1014  Last data filed at 2019  Gross per 24 hour   Intake 240 ml   Output --   Net 240 ml           Vital Sign Ranges  Temperature Temp  Av.8  F (36.6  C)  Min: 97.6  F (36.4  C)  Max: 97.9  F (36.6  C) "   Blood pressure Systolic (24hrs), Av , Min:138 , Max:162        Diastolic (24hrs), Av, Min:61, Max:71      Pulse Pulse  Av  Min: 64  Max: 74   Respirations Resp  Av  Min: 18  Max: 18   Pulse oximetry SpO2  Av %  Min: 95 %  Max: 97 %             Constitutional: Awake, alert, cooperative, no apparent distress       Skin: No rash, petechia, cyanosis       Neck: No thyromegaly or adenopathy       Lungs: clear       Cardiovasc: RRR  Gr 2 ALTHEA, min diastolic murmur       Abdomen: Normal bowel sounds, soft, non-distended, non-tender, no hepatomegaly       Neuro: Alert and oriented x3, non focal exam       Extremities: No edema       Other:             Data:     Recent Labs   Lab Test 19  0845   WBC 7.5 8.1   HGB 15.3 16.1*   MCV 97 101*    181      Recent Labs   Lab Test 19  0541 19  210    141   POTASSIUM 3.8 5.3   CHLORIDE 111* 108   BUN 12 18   CR 0.76 0.99     Recent Labs   Lab 19  0541 19  2107   * 91     Recent Labs   Lab Test 197 18  0951   ALT 11 14   AST 8 9     No components found for: TROPONINIES    Lab Results   Component Value Date    CHOL 162 2018     Lab Results   Component Value Date    HDL 62 2018     Lab Results   Component Value Date    LDL 72 2018     Lab Results   Component Value Date    TRIG 140 2018     Lab Results   Component Value Date    CHOLHDLRATIO 2.8 2015          TSH   Date Value Ref Range Status   2019 3.18 0.40 - 4.00 mU/L Final

## 2019-06-10 NOTE — DISCHARGE SUMMARY
[facility]  Discharge Summary        Polly De La Torre MRN# [id number]   YOB: 1931 Age: 87 year old     Date of Admission:  6/8/2019  Date of Discharge:  6/10/2019  Admitting Physician:  Barbara Barrientos MD  Discharge Physician: Aleksey Briseno MD  Discharging Service: Hospitalist     Primary Provider:  Tarsha Merritt  Primary Care Physician Phone Number: [phone]         Discharge Diagnoses/Problem Oriented Hospital Course (Providers):    Polly De La Torre was admitted on 6/8/2019 by Barbara Barrientos MD and I would refer you to their history and physical.  The following problems were addressed during her hospitalization:    ASSESSMENT:  Ms. De La Torre is a very pleasant 87-year-old female with past medical history of valvular disease, dyslipidemia, gastroesophageal reflux disease and gallstone pancreatitis, who came in for evaluation of weakness and recurrent falls.  She was found to have a urinary tract infection and also to be in atrial fibrillation with rapid ventricular response.      PLAN:   1.  Atrial fibrillation with rapid ventricular rate.   -- new diagnosis for the patient although she now states she had one prior episode.  -- converted to NSR on diltiazem  -- appreciate cardiology consult  -- amiodarone load x 14 days and then daily.  -- will check LFT, outpatient PFT  -- eliquis.  -- echo shows preserved LVEF, diastolic dysfunction and mild LAE   2. NTSEMI TYPE II from rapid afib.  --  Elevated troponins of 0.85 now downtrending.  -- likely demand ischemia in the setting of atrial fibrillation with RVR.    -- denies any chest pain, no shortness of breath.   -- outpatient NUC GXT, will make appointment  3.  Urinary tract infection.   -- Her UA is abnormal with 34 wbc.   -- Urine culture < 10 K urogenital kolby  -- discontinue ceftriaxone.   4.  Weakness/ recurrent falls.   -- She had 3 falls today, which is unusual for her and denies loss  of consciousness.   -- suspect from rapid afib  5.  Dyslipidemia.  We will continue her [id number] statin.   6.  History of mild cognitive impairment.   -- She lives at home with her .  She is awake, alert, oriented x 3 at the time of my examination.             Code Status:      Full Code         Important Results:      NAD  LUNGS CTA  CV RRR  ABD SOFT +BS  EXT NO EDEMA  NEURO NONFOCAL  DERM WARM AND DRY  PSYCHE  MOOD AND AFFECT STABLE         Pending Results:        Unresulted Labs Ordered in the Past 30 Days of this Admission     No orders found from 4/9/2019 to 6/9/2019.               Discharge Instructions and Follow-Up:      Follow-up Appointments     Follow-up and recommended labs and tests       Follow up with outpatient NUC GXT study  Follow up with cardiology as directed   Follow up with PCP in 1 week                  Discharge Disposition:      Discharged to home         Discharge Medications:        Current Discharge Medication List      START taking these medications    Details   !! amiodarone (PACERONE/CODARONE) 200 MG tablet Take 1 tablet (200 mg) by mouth 2 times daily for 14 days  Qty: 28 tablet, Refills: 0    Associated Diagnoses: Atrial fibrillation with RVR (H)      !! amiodarone (PACERONE/CODARONE) 200 MG tablet Take 1 tablet (200 mg) by mouth daily  Qty: 30 tablet, Refills: 1    Associated Diagnoses: Atrial fibrillation with RVR (H)      apixaban ANTICOAGULANT (ELIQUIS) 2.5 MG tablet Take 1 tablet (2.5 mg) by mouth 2 times daily  Qty: 60 tablet, Refills: 1    Associated Diagnoses: Atrial fibrillation with RVR (H)      lisinopril (PRINIVIL/ZESTRIL) 5 MG tablet Take 1 tablet (5 mg) by mouth daily  Qty: 30 tablet, Refills: 1    Associated Diagnoses: Atrial fibrillation with RVR (H)       !! - Potential duplicate medications found. Please discuss with provider.      CONTINUE these medications which have NOT CHANGED    Details   simvastatin (ZOCOR) 20 MG tablet Take 1 tablet (20 mg)  "by mouth At Bedtime  Qty: 90 tablet, Refills: 3    Associated Diagnoses: Hyperlipidemia LDL goal <130         STOP taking these medications       aspirin 81 MG tablet Comments:   Reason for Stopping:                    Allergies:         Allergies   Allergen Reactions     Cetirizine      Made her so tired after taking doesn't want to take again,just can't live that way([employer])     Darvon [Propoxyphene]      Methadone      DARVON  ---  \"crawling\" sensation             Consultations This Hospital Stay:      Consultation during this admission received from cardiology          Discharge Orders      After Care Instructions     Activity      Your activity upon discharge: activity as tolerated         Diet      Follow this diet upon discharge: Orders Placed This Encounter      Low Saturated Fat Na <2400 mg             Future tests that were ordered for you     EKG 12-lead complete w/read  (to be scheduled)      NM Lexiscan stress test (nuc card)      Pulmonary Function Test      .                  Rehab orders     Referrals     Future Labs/Procedures    Follow-Up with Cardiologist     Comments:    Follow-up after pft and nuc gxt with carissa or his np             Discharge Time:      Greater than 30 minutes.        Image Results From This Hospital Stay (For Non-EPIC Providers):        Results for orders placed or performed in visit on 01/26/19   XR Abdomen 2 Views    Narrative    ABDOMEN 2 VIEWS   1/26/2019 3:19 PM    HISTORY: Nausea and vomiting.    COMPARISON: A CT on 6/19/2012.      Impression    IMPRESSION: There is a moderate amount of stool throughout the colon.  No distended loop of bowel or air-fluid level is seen to suggest an  obstruction. There are surgical clips in the right upper quadrant from  a cholecystectomy. No other abnormality is seen.     ALEX CUNHA MD           Most Recent Lab Results In EPIC (For Non-EPIC Providers):    Most Recent 3 CBC's:  Recent Labs   Lab Test 06/08/19  2107 " 01/28/19  0845 05/23/18  0951   WBC 7.5 8.1 8.0   HGB 15.3 16.1* 16.8*   MCV 97 101* 100    181 237      Most Recent 3 BMP's:  Recent Labs   Lab Test 06/09/19  0541 06/08/19 2107 01/28/19  0845    141 139   POTASSIUM 3.8 5.3 4.5   CHLORIDE 111* 108 106   CO2 27 29 26   BUN 12 18 15   CR 0.76 0.99 0.90   ANIONGAP 5 4 7   RADHA 8.1* 9.6 9.2   * 91 93     Most Recent 3 Troponin's:  Recent Labs   Lab Test 06/09/19  1144 06/09/19  0541 06/08/19 2107   TROPI 0.332* 0.484* 0.781*     Most Recent 3 INR's:No lab results found.  Most Recent 2 LFT's:  Recent Labs   Lab Test 06/08/19 2107 05/23/18  0951   AST 8 9   ALT 11 14   ALKPHOS 63 56   BILITOTAL 0.3 0.5     Most Recent Cholesterol Panel:  Recent Labs   Lab Test 05/23/18  0951   CHOL 162   LDL 72   HDL 62   TRIG 140     Most Recent 6 Bacteria Isolates From Any Culture (See EPIC Reports for Culture Details):  Recent Labs   Lab Test 06/08/19  2107 09/15/17  2127 06/13/12  1720   CULT <10,000 colonies/mL  urogenital kolby  Susceptibility testing not routinely done   10,000 to 50,000 colonies/mL  mixed urogenital kolby  Susceptibility testing not routinely done   10 to 50,000 colonies/mL Escherichia coli     Most Recent TSH, T4 and HgbA1c:  Recent Labs   Lab Test 06/08/19 2107 08/23/12  0900   TSH 3.18   < >  --    A1C  --   --  5.5    < > = values in this interval not displayed.

## 2019-06-10 NOTE — CONSULTS
Medication coverage check for Eliquis. $28 monthly copay.    Enriqueta Porras CphT  [facility] Discharge Pharmacy Liaison  Liaison Cell: [phone]

## 2019-06-10 NOTE — PLAN OF CARE
PT: Pt discharged prior to scheduled PT intervention.    Physical Therapy Discharge Summary    Reason for therapy discharge:    Discharged to home with home therapy.    Progress towards therapy goal(s). See goals on Care Plan in [facility] electronic health record for goal details.  Goals not met.  Barriers to achieving goals:   discharge from facility.    Therapy recommendation(s):    Continued therapy is recommended.  Rationale/Recommendations:  home PT to maximize independence and safety in home environment.

## 2019-06-11 NOTE — TELEPHONE ENCOUNTER
Patient was evaluated by cardiology while inpatient for new onset A. Fib RVR, acute weakness with recurrent falls. Converted to NSR shortly after being started on IV Diltiazem. Started on po Amiodarone, Eliquis, Lisinopril and BB stopped. Called patient to discuss any post hospital d/c questions she may have, review medication changes, and confirm f/u appts and spoke with both pt and spouse. Reviewed medication list and Amiodarone titration with spouse, whom is pt's primary caregiver. Verbalized understanding of all instructions. Patient denied any SOB, chest pain, or light headedness. RN confirmed with patient/spouse that she has an apt scheduled on 6/25/19 with Y[facility]AN[facility]. OP Lexiscan scheduled on 6/18/19 secondary to declining troponin during admission to r/o CAD vs demand ischemia secondary to RVR. Patient/spouse advised to call clinic with any cardiac related questions or concerns prior to this yolande't., and verbalized understanding and agreed with plan. DANTE Lowery RN.

## 2019-06-12 NOTE — TELEPHONE ENCOUNTER
Verbal approval given per request below. Homecare/Hospice agency to fax orders for provider signature.  Pt has follow up 6/19    Amanda Boswell RN

## 2019-06-12 NOTE — TELEPHONE ENCOUNTER
Reason for Call:  Home Health Care    Neli with [facility] called regarding (reason for call): orders    Orders are needed for this patient.     PT: continue pt 2 x 2, then 1 x 1    OT:     Skilled Nursing:     Pt Provider: Dr. Merritt    Phone Number Homecare Nurse can be reached at: [phone]    Can we leave a detailed message on this number? YES    Phone number patient can be reached at: na    Best Time: any    Call taken on 6/12/2019 at 12:26 PM by Gayla Brennan

## 2019-06-13 NOTE — TELEPHONE ENCOUNTER
"Pt  called and put pt on speaker phone. Pt states has developed fatigue and \"no energy\" today. Spouse states symptoms started this afternoon. Denies weakness on one side of body vs other, denies vision changes, ataxia, dysphagia, falls, HA. Oriented x 3. No other complaints. Denies black or bloody stools, palpitations or feelings of heart racing, although unable to monitor HR for rate or regularity. Pt is s/p hospitalization for new onset A. Fib with RVR. Converted to NSR shortly after being started on IV Diltiazem. Started on po Amiodarone, Eliquis, Lisinopril and BB stopped. Reviewed medications again with pt's spouse and she has been taking correctly. Spouse states, \"She has been washing cloths, washing sheets, I think she has just been doing too much.\" Encouraged pt to rest during this post convalescence stage, and gradually increase activity as tolerated. Will call PMD's office to determine if pt can be seen tomorrow. Instructed if symptoms worsen, go to ED. Verbalized understanding. DANTE Lowery RN.  "

## 2019-06-13 NOTE — TELEPHONE ENCOUNTER
Spoke with Polly and Ed     Spoke with hospital nurse, wanted PCP to know pt was in for a-fib and had multiple med changes     They don't want to see anyone other than PCP but pt feeling very fatigued since recent hospitalization. Wants to know if she can follow up with PCP sooner than next week - no available appointments tomorrow, are you willing to work pt in?    Next 5 appointments (look out 90 days)    Jun 19, 2019  2:00 PM CDT  [facility] Visit with [facility]ha [facility]itt MD  [facility] [address] [place] [id number]  [phone]   Jun 25, 2019  1:50 PM CDT  Return Visit with Ciarra Kang PA-C  [facility] ([facility]) [address] [place] [id number]  [phone] OPT 2        Rin Martinez RN on 6/13/2019 at 4:46 PM

## 2019-06-14 NOTE — TELEPHONE ENCOUNTER
Spoke with RN about her medications yesterday. Told to see Dr Merritt. Has appt Tuesday 19th at 2 p.m. Spoke with Norma who would try and get her in earlier to see Dr Merritt. Polly's feeling fine. She doesn't have to get in early. They'll keep the 2 p.m. On June 19th, please don't cancel that.  Epic encounter sent to the patient's clinic.    Izzy Patel RN-[facility]

## 2019-06-14 NOTE — TELEPHONE ENCOUNTER
Spoke with RN about her medications yesterday. Told to see Dr Merritt. Has appt Tuesday 19th at 2 p.m. Spoke with Norma who would try and get her in earlier to see Dr Merritt. Polly's feeling fine. She doesn't have to get in early. They'll keep the 2 p.m. On June 19th, please don't cancel that.    Izzy Patel RN-[facility]

## 2019-06-18 NOTE — TELEPHONE ENCOUNTER
Called Ed, pt feels better today than any other day this week     Discussed symptoms to watch for and call for ER transport if experiences them     Ed verbalized understanding - asking if pt can get in sooner than 2pm tomorrow? Wondering if he can bring pt in the AM?    Please advise     Rin WILD RN

## 2019-06-18 NOTE — TELEPHONE ENCOUNTER
Called spoke with . He said Pt is worn out and napping. Told him to have her rest.  Informed him if SOB, or CP can go to ER. Did send results to PMD he sees Pt tomorrow.  Asked  to have her rest no working,  MARY BETH Ramires RN [facility]

## 2019-06-18 NOTE — TELEPHONE ENCOUNTER
Reason for Call:  Other appointment    Detailed comments: Mitulreno (C2C SIGNED) called in to see if we could get pt in earlier in the day than the 2pm due to her abnormal results. He will like a callback to see if possible since appt will determine pt's care.    Phone Number Patient can be reached at: Home number on file [phone] (home)    Best Time: any    Can we leave a detailed message on this number? YES    Call taken on 6/18/2019 at 6:35 PM by Tone Sanchez

## 2019-06-19 NOTE — PROGRESS NOTES
"Patient was inpatient last week with NSTEMI -  Planned for an outpatient Nuclear stress test..   Results:     \"Impression  1.  Myocardial perfusion imaging using single isotope technique  demonstrated mild distal anteroseptal ischemia and mild inferolateral  ischemia.   2. Gated images demonstrated small LV cavity size with normal LV  systolic function.  The left ventricular systolic function is 69%.  3. Compared to the prior study from no prior study .    Tomographic Findings  Overall, the study quality is adequate . On the stress images, mild  count reduction is seen in a small area of the distal anteroseptal and  mild count reduction is seen in the inferolateral wall. On the rest  images, there is reversibility seen in the distal anteroseptal wall  and inferolateral wall suggesting mild ischemia in these areas . Gated  images demonstrated small LV cavity size with end-diastolic volumes  measured at 40 and 41 mL and rest and stress respectively. The LV  systolic function appears normal. The left ventricular ejection  fraction was calculated to be 69%. TID was absent. \"     Patient updated with the nuclear results - aware there is some abnormalities.. Patient agreeable to no participate in strenuous activities and to seek ER evaluation if symptoms occur.      The patient was scheduled to see Aybike in clinic 6/25/2019 - moved up appointment to tomorrow 6/20/2019..  Updated Dr. Harrington who is available for Aybike tomorrow if needed. Hawa Richardson    "

## 2019-06-19 NOTE — PROGRESS NOTES
Polly De La Torre is a 87 year old female who presents to clinic today for the following health issues:          Hospital Follow-up Visit:    Hospital/Nursing Home/IP Rehab Facility: [facility]  Date of Admission: 06/08/2019  Date of Discharge: 06/10/2019  Reason(s) for Admission:     Atrial fibrillation with rapid ventricular rate.   NTSEMI TYPE II from rapid afib.  Urinary tract infection.   Weakness/ recurrent falls.   Dyslipidemia.  We will continue her oetuw-ya-mgeayniky statin.  History of mild cognitive impairment.          Problems taking medications regularly:  None       Medication changes since discharge: None       Problems adhering to non-medication therapy:  None    Summary of hospitalization:  [facility] discharge summary reviewed  Diagnostic Tests/Treatments reviewed.  Follow up needed: cardiology  Other Healthcare Providers Involved in Patient s Care:         Specialist appointment - cadiology appt tomorrow  Update since discharge: stable.     Post Discharge Medication Reconciliation: discharge medications reconciled and changed, per note/orders (see AVS).  Plan of care communicated with patient and family     Coding guidelines for this visit:  Type of Medical   Decision Making Face-to-Face Visit       within 7 Days of discharge Face-to-Face Visit        within 14 days of discharge   Moderate Complexity [id number]   High Complexity [id number]            HPI:   Patient Polly De La Torre is a very pleasant 87 year old female with history of hyperlipidemia, hypertension who presents to Internal Medicine clinic today for post hospital discharge follow up of NSTEMI and CAD. Patient denies any new chest pains since hospital discharge. Patient underwent a recent stress test, which is positive. patient denies any acute chest pains at this time. Patient is scheduled for further evaluation and follow up with the cardiology clinic at the [facility] cardiology clinic in [place]  "that's scheduled for 11AM tomorrow 6/20/2019. Regarding her diagnoses of atrial fibrillation with RVR, the patient's heart rate is well controlled. She is due for a refill of her cardiac medications. Patient has been compliant with her cardiac medications since hospital discharge. No fever or chills.      Current Medications:     Current Outpatient Medications   Medication Sig Dispense Refill     [START ON 6/24/2019] amiodarone (PACERONE/CODARONE) 200 MG tablet Take 1 tablet (200 mg) by mouth daily 30 tablet 3     amiodarone (PACERONE/CODARONE) 200 MG tablet Take 1 tablet (200 mg) by mouth 2 times daily for 14 days 28 tablet 0     apixaban ANTICOAGULANT (ELIQUIS) 2.5 MG tablet Take 1 tablet (2.5 mg) by mouth 2 times daily 60 tablet 5     lisinopril (PRINIVIL/ZESTRIL) 5 MG tablet Take 1 tablet (5 mg) by mouth daily 90 tablet 3     simvastatin (ZOCOR) 20 MG tablet Take 1 tablet (20 mg) by mouth At Bedtime 90 tablet 3         Allergies:      Allergies   Allergen Reactions     Cetirizine      Made her so tired after taking doesn't want to take again,just can't live that way([employer])     Darvon [Propoxyphene]      Methadone      DARVON  ---  \"crawling\" sensation             Past Medical History:     Past Medical History:   Diagnosis Date     Aortic regurgitation 2015     Aortic valve disorders      Choledocholithiasis     s/p ERCP x 3 2011, ERCP Jan 2014, Dr. Krishnan     Esophageal reflux      Gallstone pancreatitis      Osteoarthrosis, unspecified whether generalized or localized, unspecified site      Osteopenia          Past Surgical History:     Past Surgical History:   Procedure Laterality Date     C NONSPECIFIC PROCEDURE  age 16    Appy     C NONSPECIFIC PROCEDURE      D&C x 2     CHOLECYSTECTOMY  1995    [facility]         Family Medical History:     Family History   Problem Relation Age of Onset     Hypertension Father      Cerebrovascular Disease Father      Allergies Son      Prostate Cancer No family hx of      " Cancer - colorectal No family hx of          Social History:     Social History     Socioeconomic History     Marital status:      Spouse name: Not on file     Number of children: Not on file     Years of education: Not on file     Highest education level: Not on file   Occupational History     Not on file   Social Needs     Financial resource strain: Not on file     Food insecurity:     Worry: Not on file     Inability: Not on file     Transportation needs:     Medical: Not on file     Non-medical: Not on file   Tobacco Use     Smoking status: Never Smoker     Smokeless tobacco: Never Used   Substance and Sexual Activity     Alcohol use: Yes     Alcohol/week: 0.0 oz     Comment: rarely     Drug use: No     Sexual activity: Yes     Partners: Male   Lifestyle     Physical activity:     Days per week: Not on file     Minutes per session: Not on file     Stress: Not on file   Relationships     Social connections:     Talks on phone: Not on file     Gets together: Not on file     Attends Bahai service: Not on file     Active member of club or organization: Not on file     Attends meetings of clubs or organizations: Not on file     Relationship status: Not on file     Intimate partner violence:     Fear of current or ex partner: Not on file     Emotionally abused: Not on file     Physically abused: Not on file     Forced sexual activity: Not on file   Other Topics Concern     Parent/sibling w/ CABG, MI or angioplasty before 65F 55M? Not Asked   Social History Narrative     with 3 kids           Review of System:     Constitutional: Negative for fever or chills  Skin: Negative for rashes  Ears/Nose/Throat: Negative for nasal congestion, sore throat  Respiratory: No shortness of breath, dyspnea on exertion, cough, or hemoptysis  Cardiovascular: Negative for any acute chest pains in clinic today at this time. Positive for recent hospitalization for NSTEMI.  Gastrointestinal: Negative for nausea,  "vomiting  Genitourinary: Negative for dysuria, hematuria  Musculoskeletal: Negative for myalgias  Neurologic: Negative for headaches  Psychiatric: Negative for depression, anxiety  Hematologic/Lymphatic/Immunologic: Negative  Endocrine: Negative  Behavioral: Negative for tobacco use       Physical Exam:   /73 (BP Location: Right arm, Patient Position: Sitting, Cuff Size: Adult Regular)   Pulse 71   Temp 97.8  F (36.6  C) (Tympanic)   Ht 1.473 m (4' 10\")   Wt 49.1 kg (108 lb 4.8 oz)   SpO2 94%   BMI 22.63 kg/m      GENERAL: chronically ill appearing elderly female, alert and no acute distress  EYES: eyes grossly normal to inspection, and conjunctivae and sclerae normal  HENT: Normocephalic atraumatic. Nose and mouth without ulcers or lesions  NECK: supple  RESP: lungs clear to auscultation   CV: regular rate and rhythm, normal S1 S2  LYMPH: no peripheral edema   ABDOMEN: nondistended  MS: no gross musculoskeletal defects noted  SKIN: no suspicious lesions or rashes  NEURO: Alert & Oriented x 3.   PSYCH: mentation appears normal, affect normal        Diagnostic Test Results:     Diagnostic Test Results:  Results for orders placed or performed during the hospital encounter of 06/18/19   NM Lexiscan stress test (nuc card)    Narrative    GATED MYOCARDIAL PERFUSION SCINTIGRAPHY WITH INTRAVENOUS PHARMACOLOGIC  VASODILATATION LEXISCAN -ONE DAY STUDY     6/18/2019 1:14 PM  DELFINO LAUREANO  87 years  Female   9/11/1931.    Indication/Clinical History: Atrial for correlation    Impression  1.  Myocardial perfusion imaging using single isotope technique  demonstrated mild distal anteroseptal ischemia and mild inferolateral  ischemia.   2. Gated images demonstrated small LV cavity size with normal LV  systolic function.  The left ventricular systolic function is 69%.  3. Compared to the prior study from no prior study .    Procedure  Pharmacologic stress testing was performed with Lexiscan at a rate of  0.08 " mg/ml rapid bolus injection, for 15 seconds, 0.4 mg/5ml  intravenously. Low-level exercise was not performed along with the  vasodilator infusion.  The heart rate was 74 at baseline and fernando to  90 beats per minute during the Lexiscan infusion. The rest blood  pressure was 118/60 mmHg and was 128/60 mm Hg during Lexiscan  infusion. The patient experienced no symptoms  during the test.    Myocardial perfusion imaging was performed at rest, approximately 45  minutes after the injection intravenously of 3.5 mCi of Tc-99m  Myoview. At peak pharmacologic effect, 10-20 seconds after Lexiscan,   the patient was injected intravenously with 9.48 mCi of  Tc-99m  Myoview. The post-stress tomographic imaging was performed  approximately 60 minutes after stress.    EKG Findings  The resting EKG demonstrated normal sinus rhythm. The stress EKG  demonstrated no ST segment changes.    Tomographic Findings  Overall, the study quality is adequate . On the stress images, mild  count reduction is seen in a small area of the distal anteroseptal and  mild count reduction is seen in the inferolateral wall. On the rest  images, there is reversibility seen in the distal anteroseptal wall  and inferolateral wall suggesting mild ischemia in these areas . Gated  images demonstrated small LV cavity size with end-diastolic volumes  measured at 40 and 41 mL and rest and stress respectively. The LV  systolic function appears normal. The left ventricular ejection  fraction was calculated to be 69%. TID was absent.    FRANCES CASAS MD       ASSESSMENT/PLAN:   (E78.5) Hyperlipidemia LDL goal <130  (I48.91) Atrial fibrillation with RVR (H)  (R94.39) Positive cardiac stress test  (I25.10) Coronary artery disease involving native heart without angina pectoris, unspecified vessel or lesion type  (I21.4) NSTEMI (non-ST elevated myocardial infarction) (H)  (primary encounter diagnosis)  Comment: post hospital discharge follow up of NSTEMI and CAD  Plan:  apixaban ANTICOAGULANT (ELIQUIS) 2.5 MG tablet,        lisinopril (PRINIVIL/ZESTRIL) 5 MG tablet,         amiodarone (PACERONE/CODARONE) 200 MG tablet,  simvastatin (ZOCOR) 20 MG tablet  Recent positive stress test, patient denies any acute chest pains at this time. Patient is scheduled for follow up cardiology clinic at the [facility] cardiology clinic in [place] that's scheduled for 11AM tomorrow 6/20/2019.     Follow Up Plan:     Patient is instructed to return to Internal Medicine clinic for follow-up visit in 1 week. Patient is also advised to go to the ER for any new chest pains in the meantime. The patient endorses understanding of and agreement to the above recommendations.          Tarsha Merritt MD  Internal Medicine  [facility]

## 2019-06-20 NOTE — PATIENT INSTRUCTIONS
Today's Plan:   1) Schedule lung function test.   2) Follow up with Dr. Harrington in September.     If you have questions or concerns please call my nurse team at [phone].     Scheduling phone number: [phone]  Reminder: Please bring in all current medications, over the counter supplements and vitamin bottles to your next appointment.    It was a pleasure seeing you today!     Ciarra Kang PA-C  6/20/2019

## 2019-06-20 NOTE — PROGRESS NOTES
Primary Cardiologist: Dr. Harrington    Reason for Visit: Post Hospital follow up and review results of recent nuclear stress scan    History of Present Illness:   This is a very pleasant 87-year-old female with past medical history notable for moderate aortic regurgitation, mild mitral stenosis, long history of fatigue, known proximal sigmoidal septum (initially there was question of possible hypertrophic cardiomyopathy, cardiac MRI was ordered in 2015 and this showed that the septal wall thickness was likely secondary to age and that she had sigmoidal septum, there was no evidence of systolic anterior motion of the mitral valve leaflet), and recent admission with worsening weakness and frequent falls.  On admission she was found to have atrial fibrillation RVR.  She was initiated on diltiazem and was later switched to amiodarone therapy per Dr. Jefferson.  Troponin was mildly elevated with a peak of 0.8 and subsequent downtrend.  This was felt to be likely secondary to her A. fib RVR but outpatient nuclear stress scan was arrange to rule out significant ischemia.  Repeat echocardiogram was performed and this showed once again mildly thickened proximal septal wall.  LV function appeared to be normal.  Mitral valve had mean gradient of 3.9 mmHg at heart rate of 68 bpm.  There appears to be no LVOT gradient.  Left atrium was mildly dilated which was normal about 4 years ago.  On her admission she was also found to have UTI for which she was treated.  She returns to clinic today for post hospital evaluation as well as to discuss her stress nuclear study results.  Of note her TSH was checked and this was normal.  Her other lab works did not show any evidence of renal injury or anemia.  She was initiated on Eliquis therapy (reduced dose at 2.5 mg twice daily).  It was unclear if her atrial fibrillation was nonvalvular or valvular in etiology.    She returns to clinic with her  and son, stating she is feeling much much  better.  She continued to have profound fatigue when after her hospital discharge but yesterday she started to feel better and she thinks she has more energy.  She denies any exertional symptoms of chest discomfort or shortness of breath.  She has had no bleeding issues with initiation of Eliquis.  She denies lightheadedness, syncope, or any recurrent fall episodes.  She  no longer has dysuria or any other urinary symptoms.    Assessment and Plan:   This is a very pleasant 87-year-old female with past medical history notable for moderate aortic regurgitation, mild mitral stenosis, long history of fatigue, known proximal sigmoidal septum (initially there was question of possible hypertrophic cardiomyopathy, cardiac MRI was ordered in 2015 and this showed that the septal wall thickness was likely secondary to age and that she had sigmoidal septum, there was no evidence of systolic anterior motion of the mitral valve leaflet), and recent admission with worsening weakness and frequent falls where she was found to be in atrial fibrillation RVR.    Due to persistent symptoms she was eventually started on rhythm control strategy with amiodarone.  She was also started on Eliquis 2.5 mg twice daily for CVA prophylaxis.  She had mild troponin bump and therefore outpatient nuclear stress study was performed.  This showed mild anterolateral and inferolateral ischemia.  I have reviewed these findings with Dr. Harrington who recommended continued medical therapy as patient did not have any exertional symptoms.  There is a possibility that defects on her nuclear stress study are artifactual.  We will not initiate aspirin therapy given her age.  She would like to follow-up with Dr. Harrington and therefore we will keep her appointment that was already arranged in September.  Her ECG showed NSR with no concerning QT changes or AV block. Given that she is on amiodarone therapy now I did recommend that we set up a baseline pulmonary function  "test.  She will complete this in the next 2 weeks.  She will continue with Eliquis therapy.  She does not require warfarin therapy as she only has mild mitral stenosis.  Dr. Harrington was in agreement with this as well.    Thank you for allowing me to participate in the care of this pleasant patient today.    This note was completed in part using Dragon voice recognition software. Although reviewed after completion, some word and grammatical errors may occur.    Orders this Visit:  Orders Placed This Encounter   Procedures     EKG 12-lead complete w/read - Clinics (performed today)     Pulmonary function test     No orders of the defined types were placed in this encounter.    There are no discontinued medications.      Encounter Diagnosis   Name Primary?     Atrial fibrillation, unspecified type (H) Yes       CURRENT MEDICATIONS:  Current Outpatient Medications   Medication Sig Dispense Refill     [START ON 6/24/2019] amiodarone (PACERONE/CODARONE) 200 MG tablet Take 1 tablet (200 mg) by mouth daily 30 tablet 3     amiodarone (PACERONE/CODARONE) 200 MG tablet Take 1 tablet (200 mg) by mouth 2 times daily for 14 days 28 tablet 0     apixaban ANTICOAGULANT (ELIQUIS) 2.5 MG tablet Take 1 tablet (2.5 mg) by mouth 2 times daily 60 tablet 5     lisinopril (PRINIVIL/ZESTRIL) 5 MG tablet Take 1 tablet (5 mg) by mouth daily 90 tablet 3     simvastatin (ZOCOR) 20 MG tablet Take 1 tablet (20 mg) by mouth At Bedtime 90 tablet 3       ALLERGIES     Allergies   Allergen Reactions     Cetirizine      Made her so tired after taking doesn't want to take again,just can't live that way([employer]-Zyr)     Darvon [Propoxyphene]      Methadone      DARVON  ---  \"crawling\" sensation        PAST MEDICAL HISTORY:  Past Medical History:   Diagnosis Date     Aortic regurgitation 2015     Aortic valve disorders      Choledocholithiasis     s/p ERCP x 3 2011, ERCP Jan 2014, Dr. Krishnan     Esophageal reflux      Gallstone pancreatitis      " Osteoarthrosis, unspecified whether generalized or localized, unspecified site      Osteopenia        PAST SURGICAL HISTORY:  Past Surgical History:   Procedure Laterality Date     C NONSPECIFIC PROCEDURE  age 16    Appy     C NONSPECIFIC PROCEDURE      D&C x 2     CHOLECYST[facility]OM[facility]  1995    [facility]       FAMILY HISTORY:  Family History   Problem Relation Age of Onset     Hypertension Father      Cerebrovascular Disease Father      Allergies Son      Prostate Cancer No family hx of      Cancer - colorectal No family hx of        SOCIAL HISTORY:  Social History     Socioeconomic History     Marital status:      Spouse name: None     Number of children: None     Years of education: None     Highest education level: None   Occupational History     None   Social Needs     Financial resource strain: None     Food insecurity:     Worry: None     Inability: None     Transportation needs:     Medical: None     Non-medical: None   Tobacco Use     Smoking status: Never Smoker     Smokeless tobacco: Never Used   Substance and Sexual Activity     Alcohol use: Yes     Alcohol/week: 0.0 oz     Comment: rarely     Drug use: No     Sexual activity: Yes     Partners: Male   Lifestyle     Physical activity:     Days per week: None     Minutes per session: None     Stress: None   Relationships     Social connections:     Talks on phone: None     Gets together: None     Attends Voodoo service: None     Active member of club or organization: None     Attends meetings of clubs or organizations: None     Relationship status: None     Intimate partner violence:     Fear of current or ex partner: None     Emotionally abused: None     Physically abused: None     Forced sexual activity: None   Other Topics Concern     Parent/sibling w/ CABG, MI or angioplasty before 65F 55M? Not Asked   Social History Narrative     with 3 kids       Review of Systems:  Skin:  Negative     Eyes:  Negative    ENT:  Negative    Respiratory:   Negative    Cardiovascular:  Negative;palpitations;chest pain;lightheadedness;dizziness Positive for;fatigue  Gastroenterology: Negative    Genitourinary:  not assessed    Musculoskeletal:  Negative    Neurologic:  Negative    Psychiatric:  Negative    Heme/Lymph/Imm:  Negative    Endocrine:  Negative      Physical Exam:  Vitals: /63   Pulse 68   Ht 1.524 m (5')   Wt 49.3 kg (108 lb 11.2 oz)   BMI 21.23 kg/m       GEN:  NAD  NECK: No JVD  C/V:  Regular rate and rhythm, no murmur, rub or gallop.  RESP: Clear to auscultation bilaterally without wheezing, rales, or rhonchi.  GI: Abdomen soft, nontender, nondistended.   EXTREM: No LE edema.   NEURO: Alert and oriented, cooperative. No obvious focal deficits.   PSYCH: Normal affect.  SKIN: Warm and dry.       Recent Lab Results:  LIPID RESULTS:  Lab Results   Component Value Date    CHOL 162 05/23/2018    HDL 62 05/23/2018    LDL 72 05/23/2018    TRIG 140 05/23/2018    CHOLHDLRATIO 2.8 08/19/2015       LIVER ENZYME RESULTS:  Lab Results   Component Value Date    AST 8 06/08/2019    ALT 11 06/08/2019       CBC RESULTS:  Lab Results   Component Value Date    WBC 7.5 06/08/2019    RBC 4.78 06/08/2019    HGB 15.3 06/08/2019    HCT 46.4 06/08/2019    MCV 97 06/08/2019    MCH 32.0 06/08/2019    MCHC 33.0 06/08/2019    RDW 13.2 06/08/2019     06/08/2019       BMP RESULTS:  Lab Results   Component Value Date     06/09/2019    POTASSIUM 3.8 06/09/2019    CHLORIDE 111 (H) 06/09/2019    CO2 27 06/09/2019    ANIONGAP 5 06/09/2019     (H) 06/09/2019    BUN 12 06/09/2019    CR 0.76 06/09/2019    GFRESTIMATED 71 06/09/2019    GFRESTBLACK 82 06/09/2019    RADHA 8.1 (L) 06/09/2019        A1C RESULTS:  Lab Results   Component Value Date    A1C 5.5 08/23/2012       INR RESULTS:  Lab Results   Component Value Date    INR 0.94 02/08/2011    INR 0.98 01/04/2011           Ciarra Kang PA-C   June 20, 2019

## 2019-06-20 NOTE — LETTER
6/20/2019    Tarsha Merritt MD  [address]  [facility] [address]    RE: Polly De La Torre       Dear Colleague,    I had the pleasure of seeing Polly De La Torre in the [facility].    Primary Cardiologist: Dr. Harrington    Reason for Visit: Post Hospital follow up and review results of recent nuclear stress scan    History of Present Illness:   This is a very pleasant 87-year-old female with past medical history notable for moderate aortic regurgitation, mild mitral stenosis, long history of fatigue, known proximal sigmoidal septum (initially there was question of possible hypertrophic cardiomyopathy, cardiac MRI was ordered in 2015 and this showed that the septal wall thickness was likely secondary to age and that she had sigmoidal septum, there was no evidence of systolic anterior motion of the mitral valve leaflet), and recent admission with worsening weakness and frequent falls.  On admission she was found to have atrial fibrillation RVR.  She was initiated on diltiazem and was later switched to amiodarone therapy per Dr. Jefferson.  Troponin was mildly elevated with a peak of 0.8 and subsequent downtrend.  This was felt to be likely secondary to her A. fib RVR but outpatient nuclear stress scan was arrange to rule out significant ischemia.  Repeat echocardiogram was performed and this showed once again mildly thickened proximal septal wall.  LV function appeared to be normal.  Mitral valve had mean gradient of 3.9 mmHg at heart rate of 68 bpm.  There appears to be no LVOT gradient.  Left atrium was mildly dilated which was normal about 4 years ago.  On her admission she was also found to have UTI for which she was treated.  She returns to clinic today for post hospital evaluation as well as to discuss her stress nuclear study results.  Of note her TSH was checked and this was normal.  Her other lab works did not show any evidence of renal injury or anemia.  She was  initiated on Eliquis therapy (reduced dose at 2.5 mg twice daily).  It was unclear if her atrial fibrillation was nonvalvular or valvular in etiology.    She returns to clinic with her  and son, stating she is feeling much much better.  She continued to have profound fatigue when after her hospital discharge but yesterday she started to feel better and she thinks she has more energy.  She denies any exertional symptoms of chest discomfort or shortness of breath.  She has had no bleeding issues with initiation of Eliquis.  She denies lightheadedness, syncope, or any recurrent fall episodes.  She  no longer has dysuria or any other urinary symptoms.    Assessment and Plan:   This is a very pleasant 87-year-old female with past medical history notable for moderate aortic regurgitation, mild mitral stenosis, long history of fatigue, known proximal sigmoidal septum (initially there was question of possible hypertrophic cardiomyopathy, cardiac MRI was ordered in 2015 and this showed that the septal wall thickness was likely secondary to age and that she had sigmoidal septum, there was no evidence of systolic anterior motion of the mitral valve leaflet), and recent admission with worsening weakness and frequent falls where she was found to be in atrial fibrillation RVR.    Due to persistent symptoms she was eventually started on rhythm control strategy with amiodarone.  She was also started on Eliquis 2.5 mg twice daily for CVA prophylaxis.  She had mild troponin bump and therefore outpatient nuclear stress study was performed.  This showed mild anterolateral and inferolateral ischemia.  I have reviewed these findings with Dr. Harrington who recommended continued medical therapy as patient did not have any exertional symptoms.  There is a possibility that defects on her nuclear stress study are artifactual.  We will not initiate aspirin therapy given her age.  She would like to follow-up with Dr. Harrington and therefore we  "will keep her appointment that was already arranged in September.  Her ECG showed NSR with no concerning QT changes or AV block. Given that she is on amiodarone therapy now I did recommend that we set up a baseline pulmonary function test.  She will complete this in the next 2 weeks.  She will continue with Eliquis therapy.  She does not require warfarin therapy as she only has mild mitral stenosis.  Dr. Harrington was in agreement with this as well.    Thank you for allowing me to participate in the care of this pleasant patient today.    This note was completed in part using Dragon voice recognition software. Although reviewed after completion, some word and grammatical errors may occur.    Orders this Visit:  Orders Placed This Encounter   Procedures     EKG 12-lead complete w/read - Clinics (performed today)     Pulmonary function test     No orders of the defined types were placed in this encounter.    There are no discontinued medications.      Encounter Diagnosis   Name Primary?     Atrial fibrillation, unspecified type (H) Yes       CURRENT MEDICATIONS:  Current Outpatient Medications   Medication Sig Dispense Refill     [START ON 6/24/2019] amiodarone (PACERONE/CODARONE) 200 MG tablet Take 1 tablet (200 mg) by mouth daily 30 tablet 3     amiodarone (PACERONE/CODARONE) 200 MG tablet Take 1 tablet (200 mg) by mouth 2 times daily for 14 days 28 tablet 0     apixaban ANTICOAGULANT (ELIQUIS) 2.5 MG tablet Take 1 tablet (2.5 mg) by mouth 2 times daily 60 tablet 5     lisinopril (PRINIVIL/ZESTRIL) 5 MG tablet Take 1 tablet (5 mg) by mouth daily 90 tablet 3     simvastatin (ZOCOR) 20 MG tablet Take 1 tablet (20 mg) by mouth At Bedtime 90 tablet 3       ALLERGIES     Allergies   Allergen Reactions     Cetirizine      Made her so tired after taking doesn't want to take again,just can't live that way([employer]-Zyr)     Darvon [Propoxyphene]      Methadone      DARVON  ---  \"crawling\" sensation        PAST MEDICAL HISTORY:  Past " Medical History:   Diagnosis Date     Aortic regurgitation 2015     Aortic valve disorders      Choledocholithiasis     s/p ERCP x 3 2011, ERCP Jan 2014, Dr. Krishnan     Esophageal reflux      Gallstone pancreatitis      Osteoarthrosis, unspecified whether generalized or localized, unspecified site      Osteopenia        PAST SURGICAL HISTORY:  Past Surgical History:   Procedure Laterality Date     C NONSPECIFIC PROCEDURE  age 16    Appy     C NONSPECIFIC PROCEDURE      D&C x 2     CHOLECY[facility]OMY  1995    [facility]       FAMILY HISTORY:  Family History   Problem Relation Age of Onset     Hypertension Father      Cerebrovascular Disease Father      Allergies Son      Prostate Cancer No family hx of      Cancer - colorectal No family hx of        SOCIAL HISTORY:  Social History     Socioeconomic History     Marital status:      Spouse name: None     Number of children: None     Years of education: None     Highest education level: None   Occupational History     None   Social Needs     Financial resource strain: None     Food insecurity:     Worry: None     Inability: None     Transportation needs:     Medical: None     Non-medical: None   Tobacco Use     Smoking status: Never Smoker     Smokeless tobacco: Never Used   Substance and Sexual Activity     Alcohol use: Yes     Alcohol/week: 0.0 oz     Comment: rarely     Drug use: No     Sexual activity: Yes     Partners: Male   Lifestyle     Physical activity:     Days per week: None     Minutes per session: None     Stress: None   Relationships     Social connections:     Talks on phone: None     Gets together: None     Attends Jew service: None     Active member of club or organization: None     Attends meetings of clubs or organizations: None     Relationship status: None     Intimate partner violence:     Fear of current or ex partner: None     Emotionally abused: None     Physically abused: None     Forced sexual activity: None   Other Topics Concern      Parent/sibling w/ CABG, MI or angioplasty before 65F 55M? Not Asked   Social History Narrative     with 3 kids       Review of Systems:  Skin:  Negative     Eyes:  Negative    ENT:  Negative    Respiratory:  Negative    Cardiovascular:  Negative;palpitations;chest pain;lightheadedness;dizziness Positive for;fatigue  Gastroenterology: Negative    Genitourinary:  not assessed    Musculoskeletal:  Negative    Neurologic:  Negative    Psychiatric:  Negative    Heme/Lymph/Imm:  Negative    Endocrine:  Negative      Physical Exam:  Vitals: /63   Pulse 68   Ht 1.524 m (5')   Wt 49.3 kg (108 lb 11.2 oz)   BMI 21.23 kg/m        GEN:  NAD  NECK: No JVD  C/V:  Regular rate and rhythm, no murmur, rub or gallop.  RESP: Clear to auscultation bilaterally without wheezing, rales, or rhonchi.  GI: Abdomen soft, nontender, nondistended.   EXTREM: No LE edema.   NEURO: Alert and oriented, cooperative. No obvious focal deficits.   PSYCH: Normal affect.  SKIN: Warm and dry.       Recent Lab Results:  LIPID RESULTS:  Lab Results   Component Value Date    CHOL 162 05/23/2018    HDL 62 05/23/2018    LDL 72 05/23/2018    TRIG 140 05/23/2018    CHOLHDLRATIO 2.8 08/19/2015       LIVER ENZYME RESULTS:  Lab Results   Component Value Date    AST 8 06/08/2019    ALT 11 06/08/2019       CBC RESULTS:  Lab Results   Component Value Date    WBC 7.5 06/08/2019    RBC 4.78 06/08/2019    HGB 15.3 06/08/2019    HCT 46.4 06/08/2019    MCV 97 06/08/2019    MCH 32.0 06/08/2019    MCHC 33.0 06/08/2019    RDW 13.2 06/08/2019     06/08/2019       BMP RESULTS:  Lab Results   Component Value Date     06/09/2019    POTASSIUM 3.8 06/09/2019    CHLORIDE 111 (H) 06/09/2019    CO2 27 06/09/2019    ANIONGAP 5 06/09/2019     (H) 06/09/2019    BUN 12 06/09/2019    CR 0.76 06/09/2019    GFRESTIMATED 71 06/09/2019    GFRESTBLACK 82 06/09/2019    RADHA 8.1 (L) 06/09/2019        A1C RESULTS:  Lab Results   Component Value Date    A1C 5.5  08/23/2012       INR RESULTS:  Lab Results   Component Value Date    INR 0.94 02/08/2011    INR 0.98 01/04/2011           Ciarra Kang PA-C   June 20, 2019             Thank you for allowing me to participate in the care of your patient.    Sincerely,     Ciarra Kang PA-C     [facility]

## 2019-06-24 NOTE — TELEPHONE ENCOUNTER
Patient called wondering what she could use for a sore back.  Patient will try tylenol.   Patient will also contact PMD regarding sore back if something stronger than Tylenol needed.

## 2019-07-15 NOTE — TELEPHONE ENCOUNTER
Call from patient's spouse asking if the PFT results are back. Reviewed with patient that the interpretation has not been finalized. He would like a call when the results are reviewed. Patient to see Dr. Harrington in September.

## 2019-07-19 NOTE — TELEPHONE ENCOUNTER
Ciarra reply   Per my review, there is nothing that looks concerning on her PFT but we are waiting for the official interpretation which has not been completed yet. It looks like they were not able to complete DLCO as patient could not follow the instructions. This is one of the things we look at when we are following patients on amiodarone therapy. I don't there is anything else we can do about this- we will continue with amiodarone therapy at this time.     Ciarra    HPI





- HPI


Patient complains to provider of: injured anterior left hip getting out of bed


Onset: This morning


Onset/Duration: Sudden


Quality of pain: Cramping, Throbbing


Pain Level: 4


Context: 


32-year-old female was getting out of bed this morning felt a pop in her 

anterior left groin which has been so sore today she was unable to work.  She 

needs to work.  She has chronic intermittent low back pain that this was a 

little bit different.  No radiculopathy no saddle anesthesia.  No fever or 

chills.


Associated Symptoms: None


Exacerbated by: Denies, Movement


Relieved by: Denies


Similar symptoms previously: No


Recently seen / treated by doctor: No





- ROS


ROS below otherwise negative: Yes


Systems Reviewed and Negative: Yes All other systems reviewed and negative





- REPRODUCTIVE


Reproductive: DENIES: Pregnant:





- DERM


Skin Color: Normal





Past Medical History





- General


Information source: Patient





- Social History


Smoking Status: Unknown if Ever Smoked


Frequency of alcohol use: None


Drug Abuse: None


Lives with: Family


Family History: Arthritis, CAD, DM, Hyperlipidemia, Hypertension, Malignancy, 

Thyroid Disfunction


Pulmonary Medical History: Reports: Hx Bronchitis


Neurological Medical History: Reports: Hx Migraine


Renal/ Medical History: Denies: Hx Peritoneal Dialysis


Musculoskeltal Medical History: Reports Hx Musculoskeletal Trauma


Psychiatric Medical History: Reports: Hx Attention Deficit Hyperactivity 

Disorder - add, Hx Depression


Traumatic Medical History: Reports: Hx Fractures - right wrist


Past Surgical History: Reports: Hx Gynecologic Surgery - leep, Hx Oral Surgery 

- wisdom teeth, Hx Tonsillectomy, Hx Tubal Ligation





- Immunizations


Immunizations up to date: No


Hx Diphtheria, Pertussis, Tetanus Vaccination: No





Vertical Provider Document





- CONSTITUTIONAL


Agree With Documented VS: Yes


Exam Limitations: No Limitations





- INFECTION CONTROL


TRAVEL OUTSIDE OF THE [place] IN LAST 30 DAYS: No





- HEENT


HEENT: Normocephalic





- NECK


Neck: Supple





- RESPIRATORY


O2 Sat by Pulse Oximetry: 99





- GI/ABDOMEN


Gastrointestinal: Abdomen Soft, Abdomen Non-Tender, No Organomegaly





- BACK


Back: Normal Inspection





- MUSCULOSKELETAL/EXTREMETIES


Musculoskeletal/Extremeties: MAEW, FROM, Tender - Anterior proximal left thigh 

muscle and tendon to the superior anterior iliac spine.  No adenopathy no 

erythema no rash.





- NEURO


Level of Consciousness: Awake, Alert, Appropriate


Deep Tendon Reflexes: 2+ - Bilateral ankle and patellar





- DERM


Integumentary: Warm, Dry, No Rash





Course





- Vital Signs


Vital signs: 


 











Temp Pulse Resp BP Pulse Ox


 


 98.7 F   89   16   142/90 H  99 


 


 04/15/17 18:47  04/15/17 18:47  04/15/17 18:47  04/15/17 18:47  04/15/17 18:47














Discharge





- Discharge


Clinical Impression: 


Strain of left inguinal muscle


Qualifiers:


 Encounter type: initial encounter Qualified Code(s): S39.013A - Strain of 

muscle, fascia and tendon of pelvis, initial encounter





Condition: Good


Disposition: HOME, SELF-CARE


Instructions:  Muscle Strain (OM), Muscle Relaxers (OM), Use of Over-The-

Counter Ibuprofen ([facility]), Acetaminophen, Warm Packs ([facility])


Additional Instructions: 


warm compress


to er if worse





Please complete the patient satisfaction survey if you get one, and return it.. 

If you do not receive a survey,  then you can go to the [facility] website, [email or web address] 

and place your comments about your very good care. Thank you very much. It was 

a pleasure being your medical provider today.


Prescriptions: 


Cyclobenzaprine HCl [Flexeril 10 Mg Tablet] 10 mg PO TIDP PRN #20 tablet


 PRN Reason: 


Forms:  Return to Work

## 2019-07-19 NOTE — TELEPHONE ENCOUNTER
PFT results.  7-1-19  Last Aybike OV 6-20-19 Patient is on amiodarone 200 mg daily.   Post Test Comments: Good patient effort and cooperation. The results of Spirometry testing appear to be  valid, although the ATS standard for three acceptable maneuvers was not met. Patient  was unable to exhale for longer than 3 seconds, after 8 attempts. All other testing not  performed, due to patient unable to follow instructions. No history of smoking.  Pre-Bronch Post-Bronch  Actual Pred %Pred LLN Actual %Pred  ---- SPIROMETRY ----  FVC (L) 1.34 1.96 68 1.33  FEV1 (L) 1.09 1.49 72 1.01  FEV1/FVC (%) 81 77 61  FEF 25% (L/sec) 2.79  FEF 75% (L/sec) 0.40  FEF 25-75% (L/sec) 1.05 1.23 85 0.47  FEF Max (L/sec) 2.79 3.33 83 1.88  FIVC (L) 0.90

## 2019-07-19 NOTE — TELEPHONE ENCOUNTER
Patient called with Preliminary PFT results.  Explained that this test is done when Patient's are on amiodarone.   requests return call in final report notes anything abnormal.   states Patient did have a hard time following instructions on how to breath and blow.

## 2019-07-23 NOTE — PROGRESS NOTES
Message from VALENTINE [employer]rice[employer]on:  Please let patient know her lung function is essentially normal. Of note, DLCO could not be completed. We will continue to monitor for any symptoms.     Ciarra     Patient's spouse was contacted previously with results

## 2019-07-24 NOTE — TELEPHONE ENCOUNTER
New Rx pended for 90 day supply of medication for patient per insurance policy.    Brent Cohn, CMA on 7/24/2019 at 9:47 AM

## 2019-08-26 NOTE — TELEPHONE ENCOUNTER
Requested Prescriptions   Pending Prescriptions Disp Refills     amiodarone (PACERONE/CODARONE) 200 MG tablet [Pharmacy Med Name: AMIODARONE TABS 200MG] 30 tablet 12     Sig: TAKE 1 TABLET DAILY   Last Written Prescription Date:  6/24/2019  Last Fill Quantity: 60,  # refills: 3   Last office visit: 6/19/2019 with prescribing provider:  Shaina  Future Office Visit:   Next 5 appointments (look out 90 days)    Sep 23, 2019 10:45 AM CDT  Return Visit with Igor Harrington MD  [facility] ([facility]) [address] [place] [id number]  [phone] OPT 2             There is no refill protocol information for this order      Routing refill request to provider for review/approval because:  Drug not on the G refill protocol   OMAYRA Albright, RN  [employer] [facility]

## 2019-09-23 NOTE — LETTER
9/23/2019    Tarsha Merritt MD  [address]  [facility] [address]    RE: Polly FERRARA Wil       Dear Colleague,    I had the pleasure of seeing Polly Daniellejohn in the [facility].        Cardiology Clinic      Assessment & Plan       Patient is a very pleasant female who has a past medical history notable for moderate AR, mild mitral stenosis and a long history of fatigue and proximal sigmoid septum.  No evidence of hypertrophic obstructive cardiomyopathy.  She does have paroxysmal atrial fibrillation however now is in normal sinus rhythm.  Let us continue with her current medical therapy which includes amiodarone and anticoagulation.  She is on lisinopril for blood pressure control and is normotensive to low blood pressure at baseline.  She has scheduled amiodarone follow-up lab work which has been normal to date.  We will continue with this as she has maintained normal sinus rhythm.  I will have her follow-up with me in 6 months time roughly April 2020 to mitigate winter travel.      Igor Harrington MD      Patient is a very pleasant 88-year-old woman who is accompanied by her  to the cardiology clinic.  I had last seen her in 2017.  She had initially presented with a history of fatigue and a heart murmur.  She ultimately underwent an echocardiogram and cardiac MRI.  She was found to have mild to moderate aortic regurgitation with aortic valve sclerosis and a hyperdynamic LV cavity which was small.  There is some mild asymmetric septal hypertrophy however full criteria for hypertrophic cardiomyopathy was not made.  She did well up until this past summer.  She presented with atrial fibrillation and rapid ventricular response.  Troponin was minimally elevated and felt to be likely secondary to her atrial fibrillation with rapid ventricular response.  However a nuclear myocardial perfusion study showed a mild perfusion defect.  She was medically  stabilized and placed on appropriate medical therapy including anticoagulation.  She is currently stating that she is feeling much better and is approaching her baseline.  She is tolerating her anticoagulation without difficulty.  Here for a close follow-up.  Was seen by my colleague on June 20, 2019 and she has been medically managed for her stress test.        Primary Care Physician   Tarsha Merritt      Patient Active Problem List   Diagnosis     Esophageal reflux     Osteoarthritis     HYPERLIPIDEMIA LDL GOAL <130     Advanced directives, counseling/discussion     Choledocholithiasis     Vitamin B12 deficiency (non anemic)     Fatigue     Dementia without behavioral disturbance     Aortic valve insufficiency, etiology of cardiac valve disease unspecified     Osteopenia of multiple sites     Atrial fibrillation with RVR (H)       Past Medical History   I have reviewed this patient's medical history and updated it with pertinent information if needed.   Past Medical History:   Diagnosis Date     Aortic regurgitation 2015     Aortic valve disorders      Choledocholithiasis     s/p ERCP x 3 2011, ERCP Jan 2014, Dr. Krishnan     Esophageal reflux      Gallstone pancreatitis      Osteoarthrosis, unspecified whether generalized or localized, unspecified site      Osteopenia        Past Surgical History   I have reviewed this patient's surgical history and updated it with pertinent information if needed.  Past Surgical History:   Procedure Laterality Date     C NONSPECIFIC PROCEDURE  age 16    Appy     C NONSPECIFIC PROCEDURE      D&C x 2     CHOLECYSTECTOMY  1995    [facility]       Prior to Admission Medications   Cannot display prior to admission medications because the patient has not been admitted in this contact.     [unfilled]  [unfilled]  Allergies   Allergies   Allergen Reactions     Cetirizine      Made her so tired after taking doesn't want to take again,just can't live that way([employer]yr)     Darvon  "[Propoxyphene]      Methadone      DARVON  ---  \"crawling\" sensation        Social History    reports that she has never smoked. She has never used smokeless tobacco. She reports current alcohol use. She reports that she does not use drugs.    Family History   Family History   Problem Relation Age of Onset     Hypertension Father      Cerebrovascular Disease Father      Allergies Son      Prostate Cancer No family hx of      Cancer - colorectal No family hx of        Review of Systems   The comprehensive 10 point Review of Systems is negative other than noted in the HPI or here.     Physical Exam   Vital Signs with Ranges  Pulse:  [74] 74  BP: (100)/(58) 100/58  Wt Readings from Last 4 Encounters:   09/23/19 47.9 kg (105 lb 9.6 oz)   06/20/19 49.3 kg (108 lb 11.2 oz)   06/19/19 49.1 kg (108 lb 4.8 oz)   06/10/19 50.1 kg (110 lb 6.4 oz)     [unfilled]      Vitals: /58   Pulse 74   Ht 1.524 m (5')   Wt 47.9 kg (105 lb 9.6 oz)   BMI 20.62 kg/m     GEN:  NAD  NECK: No JVD  C/V:  Regular rate and rhythm, no murmur, rub or gallop.  RESP: Clear to auscultation bilaterally without wheezing, rales, or rhonchi.  GI: Abdomen soft, nontender, nondistended.   EXTREM: No LE edema.   NEURO: Alert and oriented, cooperative. No obvious focal deficits.   PSYCH: Normal affect.  SKIN: Warm and dry.     @LABRCNTIPR(tropi:5,troponinies:5)@    @LABRCNTIPR(wbc:3,hgb:3,mcv:3,plt:3,inr:3,na:3,potassium:3,chloride:3,co2:3,bun:3,cr:3,gfrestimated:3,gfrestblack:3,aniongap:3,varun:3,glc:3,albumin:2,prottotal:2,bilitotal:2,alkphos:2,alt:2,ast:2,lipase:2,tropi:3)@  Recent Labs   Lab Test 05/23/18  0951 03/16/16  0855 08/19/15  0741 08/04/14  0713   CHOL 162 154 165 149   HDL 62 57 58 63   LDL 72 77 79 66   TRIG 140 99 140 98   CHOLHDLRATIO  --   --  2.8 2.4 "     @LABRCNTIP(wbc:3,hgb:3,hct:3,mcv:3,plt:3,iron:3,ironsat:3,reticabsct:3,retp:3,feb:3,bernadette:3,b12:3,folic:3,epoe:3,morph:3)@  @LABRCNTIP(PH:3,PHV:3,PO2:3,PO2V:3,sat:3,PCO2:3,PCO2V:3,HCO3:3,HCO3V:3)@  @LABRCNTIP(NTBNPI:3,NTBNP:3)@  @LABRCNTIP(DD:1)@  @LABRCNTIP(sed:3,crp:3)@  @LABRCNTIP(PLT:3)@  @LABRCNTIP(TSH:3)@  @LABRCNTIP(color:1,appearance:1,urineg,urinebili:1,urineketone:1,s,ubld:1,urineph:1,protein:1,urobilinogen:1,nitrite:1,leukest:1,rbcu:1,wbcu:1)@    Imaging:  No results found for this or any previous visit (from the past 48 hour(s)).    Echo:  No results found for this or any previous visit (from the past 4320 hour(s)).         Thank you for allowing me to participate in the care of your patient.    Sincerely,     Igor Harrington MD     [facility]

## 2019-09-23 NOTE — PROGRESS NOTES
Cardiology Clinic      Assessment & Plan       Patient is a very pleasant female who has a past medical history notable for moderate AR, mild mitral stenosis and a long history of fatigue and proximal sigmoid septum.  No evidence of hypertrophic obstructive cardiomyopathy.  She does have paroxysmal atrial fibrillation however now is in normal sinus rhythm.  Let us continue with her current medical therapy which includes amiodarone and anticoagulation.  She is on lisinopril for blood pressure control and is normotensive to low blood pressure at baseline.  She has scheduled amiodarone follow-up lab work which has been normal to date.  We will continue with this as she has maintained normal sinus rhythm.  I will have her follow-up with me in 6 months time roughly April 2020 to mitigate winter travel.      Igor Harrington MD      Patient is a very pleasant 88-year-old woman who is accompanied by her  to the cardiology clinic.  I had last seen her in 2017.  She had initially presented with a history of fatigue and a heart murmur.  She ultimately underwent an echocardiogram and cardiac MRI.  She was found to have mild to moderate aortic regurgitation with aortic valve sclerosis and a hyperdynamic LV cavity which was small.  There is some mild asymmetric septal hypertrophy however full criteria for hypertrophic cardiomyopathy was not made.  She did well up until this past summer.  She presented with atrial fibrillation and rapid ventricular response.  Troponin was minimally elevated and felt to be likely secondary to her atrial fibrillation with rapid ventricular response.  However a nuclear myocardial perfusion study showed a mild perfusion defect.  She was medically stabilized and placed on appropriate medical therapy including anticoagulation.  She is currently stating that she is feeling much better and is approaching her baseline.  She is tolerating her anticoagulation without difficulty.  Here for a  "close follow-up.  Was seen by my colleague on June 20, 2019 and she has been medically managed for her stress test.        Primary Care Physician   Tarsha Merritt      Patient Active Problem List   Diagnosis     Esophageal reflux     Osteoarthritis     HYPERLIPIDEMIA LDL GOAL <130     Advanced directives, counseling/discussion     Choledocholithiasis     Vitamin B12 deficiency (non anemic)     Fatigue     Dementia without behavioral disturbance     Aortic valve insufficiency, etiology of cardiac valve disease unspecified     Osteopenia of multiple sites     Atrial fibrillation with RVR (H)       Past Medical History   I have reviewed this patient's medical history and updated it with pertinent information if needed.   Past Medical History:   Diagnosis Date     Aortic regurgitation 2015     Aortic valve disorders      Choledocholithiasis     s/p ERCP x 3 2011, ERCP Jan 2014, Dr. Krishnan     Esophageal reflux      Gallstone pancreatitis      Osteoarthrosis, unspecified whether generalized or localized, unspecified site      Osteopenia        Past Surgical History   I have reviewed this patient's surgical history and updated it with pertinent information if needed.  Past Surgical History:   Procedure Laterality Date     C NONSPECIFIC PROCEDURE  age 16    Appy     C NONSPECIFIC PROCEDURE      D&C x 2     CHOLECYSTECTOMY  1995     [facility]       Prior to Admission Medications   Cannot display prior to admission medications because the patient has not been admitted in this contact.     [unfilled]  [unfilled]  Allergies   Allergies   Allergen Reactions     Cetirizine      Made her so tired after taking doesn't want to take again,just can't live that way([employer])     Darvon [Propoxyphene]      Methadone      DARVON  ---  \"crawling\" sensation        Social History    reports that she has never smoked. She has never used smokeless tobacco. She reports current alcohol use. She reports that she does not use drugs.    Family " History   Family History   Problem Relation Age of Onset     Hypertension Father      Cerebrovascular Disease Father      Allergies Son      Prostate Cancer No family hx of      Cancer - colorectal No family hx of        Review of Systems   The comprehensive 10 point Review of Systems is negative other than noted in the HPI or here.     Physical Exam   Vital Signs with Ranges  Pulse:  [74] 74  BP: (100)/(58) 100/58  Wt Readings from Last 4 Encounters:   09/23/19 47.9 kg (105 lb 9.6 oz)   06/20/19 49.3 kg (108 lb 11.2 oz)   06/19/19 49.1 kg (108 lb 4.8 oz)   06/10/19 50.1 kg (110 lb 6.4 oz)     [unfilled]      Vitals: /58   Pulse 74   Ht 1.524 m (5')   Wt 47.9 kg (105 lb 9.6 oz)   BMI 20.62 kg/m    GEN:  NAD  NECK: No JVD  C/V:  Regular rate and rhythm, no murmur, rub or gallop.  RESP: Clear to auscultation bilaterally without wheezing, rales, or rhonchi.  GI: Abdomen soft, nontender, nondistended.   EXTREM: No LE edema.   NEURO: Alert and oriented, cooperative. No obvious focal deficits.   PSYCH: Normal affect.  SKIN: Warm and dry.     @LABRCNTIPR(tropi:5,troponinies:5)@    @LABRCNTIPR(wbc:3,hgb:3,mcv:3,plt:3,inr:3,na:3,potassium:3,chloride:3,co2:3,bun:3,cr:3,gfrestimated:3,gfrestblack:3,aniongap:3,varun:3,glc:3,albumin:2,prottotal:2,bilitotal:2,alkphos:2,alt:2,ast:2,lipase:2,tropi:3)@  Recent Labs   Lab Test 05/23/18  0951 03/16/16  0855 08/19/15  0741 08/04/14  0713   CHOL 162 154 165 149   HDL 62 57 58 63   LDL 72 77 79 66   TRIG 140 99 140 98   CHOLHDLRATIO  --   --  2.8 2.4      @LABRCNTIP(wbc:3,hgb:3,hct:3,mcv:3,plt:3,iron:3,ironsat:3,reticabsct:3,retp:3,feb:3,bernadette:3,b12:3,folic:3,epoe:3,morph:3)@  @LABRCNTIP(PH:3,PHV:3,PO2:3,PO2V:3,sat:3,PCO2:3,PCO2V:3,HCO3:3,HCO3V:3)@  @LABRCNTIP(NTBNPI:3,NTBNP:3)@  @LABRCNTIP(DD:1)@  @LABRCNTIP(sed:3,crp:3)@  @LABRCNTIP(PLT:3)@  @LABRCNTIP(TSH:3)@  @LABRCNTIP(color:1,appearance:1,urineg,urinebili:1,urineketone:1,s,ubld:1,urineph:1,protein:1,urobilinogen:1,nitrite:1,leukest:1,rbcu:1,wbcu:1)@    Imaging:  No results found for this or any previous visit (from the past 48 hour(s)).    Echo:  No results found for this or any previous visit (from the past 4320 hour(s)).

## 2019-11-29 NOTE — ED PROVIDER NOTES
History     Chief Complaint:  Generalized Weakness    The history is provided by the patient.      Polly De La Torre is a 88 year old female, with history significant for atrial fibrillation on Eliquis, who presents after fall with concerns for generalized weakness and collapse.   She states that her  drove her to the store and when she finished shopping, she became lightheaded and both of her legs froze up and would not move forward, resulting in her legs giving out on her and she collapsed to the ground.  She states she fell on her left buttock secondary to abnormal weakness of her bilateral lower extremities and near-syncopal lightheadedness.  She was assisted to standing by bystanders and was able to walk.  Her  drove her here.   She raised concerns for possible CVA. She is anticoagulated with Eliquis for stroke prevention for atrial fibrillation history.  She does not have any history of stroke.  She did not hit her head.  Patient is ambulatory without pain and states she feels back to normal during H&P. She denies any syncope, nausea, chest pain, shortness of breath, recent illnesses, headache, confusion, speech disturbance, seizure activity, headache, neck pain, vision changes, change in appetite, or head injury.    Allergies:  Cetirizine  Darvon  Methadone    Medications:    Amiodarone  Eliquis  Lisinopril  Zocor    Past Medical History:    Aortic regurgitation  Choledocholithiasis  Reflux  Gallstone pancreatitis  Osteopenia  Afib with RVR  Osteoarthrosis  Dementia    Past Surgical History:    Appendectomy  D&C x2  Cholecystectomy    Family History:    HTN  CVA    Social History:  Accompanied by son  Never Smoker  Alcohol Use: Positive  Marital Status:       Review of Systems   Constitutional: Negative for appetite change, chills and fever.   HENT: Negative for congestion and rhinorrhea.    Respiratory: Negative for cough and shortness of breath.    Cardiovascular: Negative for  chest pain.   Gastrointestinal: Negative for nausea.   Musculoskeletal: Negative for neck pain.   Neurological: Positive for weakness and light-headedness. Negative for syncope, speech difficulty and headaches.   Psychiatric/Behavioral: Negative for confusion.   All other systems reviewed and are negative.    Physical Exam   Patient Vitals for the past 24 hrs:   BP Temp Temp src Pulse Resp SpO2 Height Weight   11/29/19 1115 133/50 97.7  F (36.5  C) Temporal 70 18 96 % 1.524 m (5') 47.6 kg (105 lb)     Physical Exam  Nursing note and vitals reviewed.  Constitutional:  Appears well-developed and well-nourished.   HENT:   Head:    Atraumatic.   Mouth/Throat:   Oropharynx is clear and moist. No oropharyngeal exudate.   Eyes:    Pupils are equal, round, and reactive to light.   Neck:    Normal range of motion. Neck supple. Neck non tender.     No tracheal deviation present. No thyromegaly present.   Cardiovascular:  Normal rate, regular rhythm, no murmur   Pulmonary/Chest: Breath sounds are clear and equal without wheezes or crackles.  Abdominal:   Soft. Bowel sounds are normal. Exhibits no distension and      no mass. There is no tenderness.      There is no rebound and no guarding.   Musculoskeletal:  Exhibits no edema. Non tender back, pelvis, arms and legs.  Lymphadenopathy:  No cervical adenopathy.   Neurological:   Ambulates normally without pain or weakness. Alert and oriented to person, place, and time. GCS 15.  CN 2-12 intact.  and proximal upper extremity strength strong and equal.  Bilateral lower extremity strength strong and equal, including strong dorsiflexion and plantarflexion strength.  Sensation intact and equal to the face, arms and legs.  No facial droop or weakness. Normal speech.  Follows commands and answers questions normally.    Skin:    Skin is warm and dry. No rash noted. No pallor.     Emergency Department Course   ECG:  ECG taken at 1200, ECG read at 1209  Normal sinus rhythm  Normal  ECG  Rate 71 bpm. NY interval 192 ms. QRS duration 82 ms. QT/QTc 426/462 ms. P-R-T axes 60 -8 5.    Imaging:  Head CT w/o contrast  1. No acute abnormality.  2. Atrophy of the brain.  White matter changes consistent with sequelae of small vessel ischemic disease.   Reading per radiology    Laboratory:  CBC: WBC 9.2, HGB 15.5,   BMP: glc 110 (H) creatinine 1.05 (H) GFR 47 (L) o/w WNL    Troponin (Collected 1253): <0.015    UA: blood trace (A) LE moderate (A) bacteria few (A) mucous present (A) o/w WNL  Urine culture: Pending    Interventions:  1429: NS 1L IV Bolus   1435: Rocephin 1 g in  mL IV infusion    Emergency Department Course:  Nursing notes and vitals reviewed.  1200 EKG obtained in the ED, see results above.  1205 IV was inserted and blood was drawn for laboratory testing, results above.  1228 I performed an exam of the patient as documented above. Patient was able to ambulate for me.   1316 The patient provided a urine sample here in the emergency department. This was sent for laboratory testing, findings above.  1324 The patient was sent for a CT while in the emergency department, results above.   1440 Findings and plan explained to the patient. Patient discharged home with instructions regarding supportive care, medications, and reasons to return. The importance of close follow-up was reviewed.   Impression & Plan      Medical Decision Making:  Polly De La Torre is a 88 year old female who presents to the emergency department today for evaluation of near-syncopal episode which caused her to collapse but there was no sign of head injury, stroke, intracranial bleeding, or systematic bleeding. I found her to have a mild UTI which can be a cause for her weakness and near-syncope so she was given ceftriaxone here and will be discharged on 3 days of Keflex. She was offered hospital admission which she declines. I ambulated her here and she ambulated well. I did not feel there were signs for  hip or pelvic fracture therefore I did not feel x-rays are indicated since she has no pain with ambulation. She was instructed on orally hydrating herself and to return if any symptoms worsen. She does not appear to be septic or toxic here either and I felt she'd be safe for discharge with follow-up with her primary care doctor on Monday.     Diagnosis:    ICD-10-CM   1. Near syncope R55   2. Urinary tract infection without hematuria, site unspecified N39.0     Disposition: Home    Discharge Medications:  cephALEXin 500 MG capsule  Commonly known as:  KEFLEX      Dose:  500 mg  Start taking on:  November 30, 2019  Take 1 capsule (500 mg) by mouth 3 times daily for 3 days  Quantity:  9 capsule  Refills:  0       Scribe Disclosure:  I, Dario Sellers, am serving as a scribe at 12:06 PM on 11/29/2019 to document services personally performed by Paulette Gatica MD based on my observations and the provider's statements to me.     EMERGENCY DEPARTMENT       Paulette Gatica MD  11/29/19 8784

## 2019-11-29 NOTE — ED TRIAGE NOTES
Pt out shopping and had an episode of weakness and fell landing on left buttock pt ambulatory after fall

## 2019-11-29 NOTE — ED AVS SNAPSHOT
Emergency Department  [address] [place] [id number]  Phone:  [phone]  Fax:  [phone]                                    Polly De La Torre   MRN: [id number]    Department:   Emergency Department   Date of Visit:  11/29/2019           After Visit Summary Signature Page    I have received my discharge instructions, and my questions have been answered. I have discussed any challenges I see with this plan with the nurse or doctor.    ..........................................................................................................................................  Patient/Patient Representative Signature      ..........................................................................................................................................  Patient Representative Print Name and Relationship to Patient    ..................................................               ................................................  Date                                   Time    ..........................................................................................................................................  Reviewed by Signature/Title    ...................................................              ..............................................  Date                                               Time          22EPIC Rev 08/18

## 2019-12-02 NOTE — PROGRESS NOTES
"  Chief Complaint:       Polly De La Torre is a 88 year old female who presents to clinic today for the following health issues:      ED/UC Followup:    Facility:  [facility]  Date of visit: 11-  Reason for visit: falls  Current Status: Feeling good       HPI:   Patient Polly De La Torre is a very pleasant 88 year old female with history of hypertension, atrial fibrillation who presents to Internal Medicine clinic today brought in by her daughter from home with her 90 year old  for post ER discharge follow up of frequent falls recently;. Regarding the patient's frequent falls, the patient has no acute head injuries or fractures from her recent falls. Patient does not walk with canes or walker. Today, in clinic, the patient again refused to consider starting walking with canes or walkers because she doesn't think she needs them. Patient still lives independently in her own condo with her 90 year old . the patient has not yet started looking into the possibility of moving into an assisted living facility.    Current Medications:     Current Outpatient Medications   Medication Sig Dispense Refill     amiodarone (PACERONE/CODARONE) 200 MG tablet TAKE 1 TABLET DAILY 30 tablet 12     apixaban ANTICOAGULANT (ELIQUIS) 2.5 MG tablet Take 1 tablet (2.5 mg) by mouth 2 times daily 180 tablet 3     cephALEXin (KEFLEX) 500 MG capsule Take 1 capsule (500 mg) by mouth 3 times daily for 3 days 9 capsule 0     lisinopril (PRINIVIL/ZESTRIL) 5 MG tablet Take 1 tablet (5 mg) by mouth daily 90 tablet 3     simvastatin (ZOCOR) 20 MG tablet Take 1 tablet (20 mg) by mouth At Bedtime 90 tablet 3         Allergies:      Allergies   Allergen Reactions     Cetirizine      Made her so tired after taking doesn't want to take again,just can't live that way([employer])     Darvon [Propoxyphene]      Methadone      DARVON  ---  \"crawling\" sensation             Past Medical History:     Past Medical History:   Diagnosis Date "     Aortic regurgitation 2015     Aortic valve disorders      Choledocholithiasis     s/p ERCP x 3 2011, ERCP Jan 2014, Dr. Krishnan     Esophageal reflux      Gallstone pancreatitis      Osteoarthrosis, unspecified whether generalized or localized, unspecified site      Osteopenia          Past Surgical History:     Past Surgical History:   Procedure Laterality Date     C NONSPECIFIC PROCEDURE  age 16    Appy     C NONSPECIFIC PROCEDURE      D&C x 2     CHOLECYSTECTOMY  1995    [facility]         Family Medical History:     Family History   Problem Relation Age of Onset     Hypertension Father      Cerebrovascular Disease Father      Allergies Son      Prostate Cancer No family hx of      Cancer - colorectal No family hx of          Social History:     Social History     Socioeconomic History     Marital status:      Spouse name: Not on file     Number of children: Not on file     Years of education: Not on file     Highest education level: Not on file   Occupational History     Not on file   Social Needs     Financial resource strain: Not on file     Food insecurity:     Worry: Not on file     Inability: Not on file     Transportation needs:     Medical: Not on file     Non-medical: Not on file   Tobacco Use     Smoking status: Never Smoker     Smokeless tobacco: Never Used   Substance and Sexual Activity     Alcohol use: Yes     Alcohol/week: 0.0 standard drinks     Comment: rarely     Drug use: No     Sexual activity: Yes     Partners: Male   Lifestyle     Physical activity:     Days per week: Not on file     Minutes per session: Not on file     Stress: Not on file   Relationships     Social connections:     Talks on phone: Not on file     Gets together: Not on file     Attends Pentecostalism service: Not on file     Active member of club or organization: Not on file     Attends meetings of clubs or organizations: Not on file     Relationship status: Not on file     Intimate partner violence:     Fear of current  or ex partner: Not on file     Emotionally abused: Not on file     Physically abused: Not on file     Forced sexual activity: Not on file   Other Topics Concern     Parent/sibling w/ CABG, MI or angioplasty before 65F 55M? Not Asked   Social History Narrative     with 3 kids           Review of System:     Constitutional: Negative for fever or chills  Skin: Negative for rashes  Ears/Nose/Throat: Negative for nasal congestion, sore throat  Respiratory: No shortness of breath, dyspnea on exertion, cough, or hemoptysis  Cardiovascular: Negative for chest pain  Gastrointestinal: Negative for nausea, vomiting  Genitourinary: Negative for dysuria, hematuria  Musculoskeletal: positive for frequent falls  Neurologic: Negative for headaches  Psychiatric: Negative for depression, anxiety  Hematologic/Lymphatic/Immunologic: Negative  Endocrine: Negative  Behavioral: Negative for tobacco use       Physical Exam:   BP (!) 145/64 (BP Location: Right arm, Cuff Size: Adult Regular)   Pulse 64   Temp 97.6  F (36.4  C) (Oral)   Ht 1.524 m (5')   Wt 46.5 kg (102 lb 8 oz)   SpO2 96%   BMI 20.02 kg/m      GENERAL: chronically ill appearing elderly female, alert and no acute distress  EYES: eyes grossly normal to inspection, and conjunctivae and sclerae normal  HENT: Normocephalic atraumatic. Nose and mouth without ulcers or lesions  NECK: supple  RESP: lungs clear to auscultation   CV: regular rate and rhythm, normal S1 S2  LYMPH: no peripheral edema   ABDOMEN: nondistended  MS: no gross musculoskeletal defects noted  SKIN: no suspicious lesions or rashes  NEURO: Alert & Oriented x 3.   PSYCH: mentation appears normal, affect normal        Diagnostic Test Results:     Diagnostic Test Results:  No results found for any visits on 12/02/19.    ASSESSMENT/PLAN:     (Z71.89) Advance care planning  (R29.6) Falls frequently  (primary encounter diagnosis)  Comment: post ER discharge follow up of frequent falls, no acute head  injuries or fractures from recent falls. Patient does not walk with canes or walker  Plan: patient again refused to consider starting walking with canes or walkers because she doesn't think she needs them. Patient still lives independently in her own condo with her 90 year old . I have strongly advised the patient to start looking into the possibility of moving into an assisted living facility going forward.      (I10) Essential hypertension  Comment: BP is at an acceptable range for her age  Plan: continue current BP medication regimen      (I48.0) Paroxysmal atrial fibrillation (H)  Comment: heart rate is well controlled. No chest pains  Plan: continue current cardiac medication regimen including Eliquis long term anticoagulation therapy    Follow Up Plan:     Patient is instructed to return to Internal Medicine clinic for follow-up visit in 1 month.        Tarsha Merritt MD  Internal Medicine  [facility]

## 2020-01-01 ENCOUNTER — MEDICAL CORRESPONDENCE (OUTPATIENT)
Dept: HEALTH INFORMATION MANAGEMENT | Facility: CLINIC | Age: 85
End: 2020-01-01

## 2020-01-01 ENCOUNTER — APPOINTMENT (OUTPATIENT)
Dept: CARDIOLOGY | Facility: CLINIC | Age: 85
DRG: 065 | End: 2020-01-01
Attending: INTERNAL MEDICINE
Payer: MEDICARE

## 2020-01-01 ENCOUNTER — APPOINTMENT (OUTPATIENT)
Dept: OCCUPATIONAL THERAPY | Facility: CLINIC | Age: 85
DRG: 065 | End: 2020-01-01
Payer: MEDICARE

## 2020-01-01 ENCOUNTER — OFFICE VISIT (OUTPATIENT)
Dept: FAMILY MEDICINE | Facility: CLINIC | Age: 85
End: 2020-01-01
Payer: MEDICARE

## 2020-01-01 ENCOUNTER — TELEPHONE (OUTPATIENT)
Dept: FAMILY MEDICINE | Facility: CLINIC | Age: 85
End: 2020-01-01

## 2020-01-01 ENCOUNTER — OFFICE VISIT (OUTPATIENT)
Dept: URGENT CARE | Facility: URGENT CARE | Age: 85
End: 2020-01-01
Payer: MEDICARE

## 2020-01-01 ENCOUNTER — HOSPITAL ENCOUNTER (INPATIENT)
Facility: CLINIC | Age: 85
LOS: 2 days | Discharge: HOME HEALTH | DRG: 065 | End: 2020-02-29
Attending: EMERGENCY MEDICINE | Admitting: INTERNAL MEDICINE
Payer: MEDICARE

## 2020-01-01 ENCOUNTER — APPOINTMENT (OUTPATIENT)
Dept: PHYSICAL THERAPY | Facility: CLINIC | Age: 85
DRG: 065 | End: 2020-01-01
Payer: MEDICARE

## 2020-01-01 ENCOUNTER — APPOINTMENT (OUTPATIENT)
Dept: CT IMAGING | Facility: CLINIC | Age: 85
DRG: 065 | End: 2020-01-01
Attending: EMERGENCY MEDICINE
Payer: MEDICARE

## 2020-01-01 ENCOUNTER — VIRTUAL VISIT (OUTPATIENT)
Dept: CARDIOLOGY | Facility: CLINIC | Age: 85
End: 2020-01-01
Attending: INTERNAL MEDICINE
Payer: MEDICARE

## 2020-01-01 ENCOUNTER — VIRTUAL VISIT (OUTPATIENT)
Dept: FAMILY MEDICINE | Facility: CLINIC | Age: 85
End: 2020-01-01
Payer: MEDICARE

## 2020-01-01 ENCOUNTER — APPOINTMENT (OUTPATIENT)
Dept: SPEECH THERAPY | Facility: CLINIC | Age: 85
DRG: 065 | End: 2020-01-01
Payer: MEDICARE

## 2020-01-01 ENCOUNTER — DOCUMENTATION ONLY (OUTPATIENT)
Dept: OTHER | Facility: CLINIC | Age: 85
End: 2020-01-01

## 2020-01-01 ENCOUNTER — APPOINTMENT (OUTPATIENT)
Dept: MRI IMAGING | Facility: CLINIC | Age: 85
DRG: 065 | End: 2020-01-01
Payer: MEDICARE

## 2020-01-01 ENCOUNTER — APPOINTMENT (OUTPATIENT)
Dept: SPEECH THERAPY | Facility: CLINIC | Age: 85
DRG: 065 | End: 2020-01-01
Attending: INTERNAL MEDICINE
Payer: MEDICARE

## 2020-01-01 ENCOUNTER — APPOINTMENT (OUTPATIENT)
Dept: MRI IMAGING | Facility: CLINIC | Age: 85
DRG: 065 | End: 2020-01-01
Attending: INTERNAL MEDICINE
Payer: MEDICARE

## 2020-01-01 ENCOUNTER — APPOINTMENT (OUTPATIENT)
Dept: PHYSICAL THERAPY | Facility: CLINIC | Age: 85
DRG: 065 | End: 2020-01-01
Attending: INTERNAL MEDICINE
Payer: MEDICARE

## 2020-01-01 ENCOUNTER — APPOINTMENT (OUTPATIENT)
Dept: OCCUPATIONAL THERAPY | Facility: CLINIC | Age: 85
DRG: 065 | End: 2020-01-01
Attending: INTERNAL MEDICINE
Payer: MEDICARE

## 2020-01-01 VITALS
BODY MASS INDEX: 20.26 KG/M2 | HEIGHT: 60 IN | WEIGHT: 103.2 LBS | OXYGEN SATURATION: 98 % | TEMPERATURE: 96.7 F | HEART RATE: 64 BPM | SYSTOLIC BLOOD PRESSURE: 108 MMHG | DIASTOLIC BLOOD PRESSURE: 66 MMHG

## 2020-01-01 VITALS
DIASTOLIC BLOOD PRESSURE: 74 MMHG | BODY MASS INDEX: 18.4 KG/M2 | HEIGHT: 60 IN | HEART RATE: 80 BPM | OXYGEN SATURATION: 94 % | TEMPERATURE: 97.6 F | SYSTOLIC BLOOD PRESSURE: 117 MMHG | WEIGHT: 93.7 LBS

## 2020-01-01 VITALS
OXYGEN SATURATION: 99 % | WEIGHT: 98.4 LBS | HEART RATE: 61 BPM | SYSTOLIC BLOOD PRESSURE: 142 MMHG | HEIGHT: 60 IN | BODY MASS INDEX: 19.32 KG/M2 | TEMPERATURE: 96.6 F | DIASTOLIC BLOOD PRESSURE: 60 MMHG

## 2020-01-01 VITALS
WEIGHT: 97.5 LBS | DIASTOLIC BLOOD PRESSURE: 43 MMHG | TEMPERATURE: 98.2 F | SYSTOLIC BLOOD PRESSURE: 157 MMHG | BODY MASS INDEX: 19.04 KG/M2 | HEART RATE: 67 BPM | RESPIRATION RATE: 18 BRPM | OXYGEN SATURATION: 98 %

## 2020-01-01 VITALS
BODY MASS INDEX: 19.63 KG/M2 | OXYGEN SATURATION: 93 % | HEART RATE: 59 BPM | WEIGHT: 100 LBS | DIASTOLIC BLOOD PRESSURE: 69 MMHG | SYSTOLIC BLOOD PRESSURE: 185 MMHG | TEMPERATURE: 97.2 F | HEIGHT: 60 IN

## 2020-01-01 VITALS
OXYGEN SATURATION: 94 % | TEMPERATURE: 97.6 F | HEART RATE: 88 BPM | BODY MASS INDEX: 20.12 KG/M2 | SYSTOLIC BLOOD PRESSURE: 129 MMHG | DIASTOLIC BLOOD PRESSURE: 67 MMHG | WEIGHT: 103 LBS

## 2020-01-01 VITALS
HEIGHT: 60 IN | HEART RATE: 64 BPM | OXYGEN SATURATION: 91 % | TEMPERATURE: 96.8 F | SYSTOLIC BLOOD PRESSURE: 157 MMHG | BODY MASS INDEX: 19.63 KG/M2 | WEIGHT: 100 LBS | DIASTOLIC BLOOD PRESSURE: 69 MMHG

## 2020-01-01 VITALS
OXYGEN SATURATION: 92 % | TEMPERATURE: 97.3 F | SYSTOLIC BLOOD PRESSURE: 131 MMHG | BODY MASS INDEX: 20.22 KG/M2 | DIASTOLIC BLOOD PRESSURE: 69 MMHG | HEART RATE: 79 BPM | WEIGHT: 103 LBS | HEIGHT: 60 IN

## 2020-01-01 VITALS
SYSTOLIC BLOOD PRESSURE: 121 MMHG | OXYGEN SATURATION: 96 % | DIASTOLIC BLOOD PRESSURE: 57 MMHG | RESPIRATION RATE: 16 BRPM | WEIGHT: 95 LBS | HEART RATE: 62 BPM | BODY MASS INDEX: 18.65 KG/M2 | TEMPERATURE: 97.4 F | HEIGHT: 60 IN

## 2020-01-01 DIAGNOSIS — M62.81 GENERALIZED MUSCLE WEAKNESS: ICD-10-CM

## 2020-01-01 DIAGNOSIS — I63.9 CEREBROVASCULAR ACCIDENT (CVA), UNSPECIFIED MECHANISM (H): ICD-10-CM

## 2020-01-01 DIAGNOSIS — R29.6 FALLS FREQUENTLY: Primary | ICD-10-CM

## 2020-01-01 DIAGNOSIS — Z76.0 ENCOUNTER FOR MEDICATION REFILL: ICD-10-CM

## 2020-01-01 DIAGNOSIS — I48.91 ATRIAL FIBRILLATION WITH RVR (H): ICD-10-CM
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
## 2020-01-01 DIAGNOSIS — R05.9 COUGH: ICD-10-CM

## 2020-01-01 DIAGNOSIS — J06.9 UPPER RESPIRATORY TRACT INFECTION, UNSPECIFIED TYPE: Primary | ICD-10-CM

## 2020-01-01 DIAGNOSIS — J18.9 PNEUMONIA OF BOTH LOWER LOBES DUE TO INFECTIOUS ORGANISM: ICD-10-CM

## 2020-01-01 DIAGNOSIS — I10 ESSENTIAL HYPERTENSION: ICD-10-CM

## 2020-01-01 DIAGNOSIS — F03.90 DEMENTIA WITHOUT BEHAVIORAL DISTURBANCE, UNSPECIFIED DEMENTIA TYPE: Primary | Chronic | ICD-10-CM

## 2020-01-01 DIAGNOSIS — Z71.89 ADVANCED CARE PLANNING/COUNSELING DISCUSSION: ICD-10-CM

## 2020-01-01 DIAGNOSIS — I10 BENIGN ESSENTIAL HYPERTENSION: ICD-10-CM

## 2020-01-01 DIAGNOSIS — S61.412A SKIN TEAR OF LEFT HAND WITHOUT COMPLICATION, INITIAL ENCOUNTER: ICD-10-CM

## 2020-01-01 DIAGNOSIS — J18.9 PNEUMONIA OF BOTH LOWER LOBES DUE TO INFECTIOUS ORGANISM: Primary | ICD-10-CM

## 2020-01-01 DIAGNOSIS — J06.9 UPPER RESPIRATORY TRACT INFECTION, UNSPECIFIED TYPE: ICD-10-CM

## 2020-01-01 DIAGNOSIS — I63.9 CEREBELLAR INFARCT (H): ICD-10-CM

## 2020-01-01 DIAGNOSIS — E78.5 HYPERLIPIDEMIA LDL GOAL <130: ICD-10-CM

## 2020-01-01 DIAGNOSIS — R05.9 COUGH: Primary | ICD-10-CM
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
## 2020-01-01 DIAGNOSIS — R55 SYNCOPE, UNSPECIFIED SYNCOPE TYPE: ICD-10-CM

## 2020-01-01 DIAGNOSIS — R41.3 MEMORY LOSS: Primary | ICD-10-CM

## 2020-01-01 DIAGNOSIS — E78.5 HYPERLIPIDEMIA LDL GOAL <130: Primary | ICD-10-CM

## 2020-01-01 DIAGNOSIS — Z79.01 LONG TERM CURRENT USE OF ANTICOAGULANT THERAPY: ICD-10-CM

## 2020-01-01 LAB
ALBUMIN SERPL-MCNC: 2.5 G/DL (ref 3.4–5)
ALBUMIN UR-MCNC: NEGATIVE MG/DL
ALP SERPL-CCNC: 115 U/L (ref 40–150)
ALT SERPL W P-5'-P-CCNC: 36 U/L (ref 0–50)
ANION GAP SERPL CALCULATED.3IONS-SCNC: 3 MMOL/L (ref 3–14)
ANION GAP SERPL CALCULATED.3IONS-SCNC: 6 MMOL/L (ref 3–14)
APPEARANCE UR: CLEAR
AST SERPL W P-5'-P-CCNC: 27 U/L (ref 0–45)
BASOPHILS # BLD AUTO: 0 10E9/L (ref 0–0.2)
BASOPHILS NFR BLD AUTO: 0.1 %
BILIRUB DIRECT SERPL-MCNC: <0.1 MG/DL (ref 0–0.2)
BILIRUB SERPL-MCNC: 0.4 MG/DL (ref 0.2–1.3)
BILIRUB UR QL STRIP: NEGATIVE
BUN SERPL-MCNC: 20 MG/DL (ref 7–30)
BUN SERPL-MCNC: 32 MG/DL (ref 7–30)
CALCIUM SERPL-MCNC: 7.8 MG/DL (ref 8.5–10.1)
CALCIUM SERPL-MCNC: 8 MG/DL (ref 8.5–10.1)
CHLORIDE SERPL-SCNC: 101 MMOL/L (ref 94–109)
CHLORIDE SERPL-SCNC: 106 MMOL/L (ref 94–109)
CHOLEST SERPL-MCNC: 186 MG/DL
CO2 SERPL-SCNC: 27 MMOL/L (ref 20–32)
CO2 SERPL-SCNC: 31 MMOL/L (ref 20–32)
COLOR UR AUTO: ABNORMAL
CREAT SERPL-MCNC: 0.94 MG/DL (ref 0.52–1.04)
CREAT SERPL-MCNC: 1.12 MG/DL (ref 0.52–1.04)
DIFFERENTIAL METHOD BLD: ABNORMAL
EOSINOPHIL # BLD AUTO: 0 10E9/L (ref 0–0.7)
EOSINOPHIL NFR BLD AUTO: 0 %
ERYTHROCYTE [DISTWIDTH] IN BLOOD BY AUTOMATED COUNT: 15.6 % (ref 10–15)
ERYTHROCYTE [DISTWIDTH] IN BLOOD BY AUTOMATED COUNT: 15.7 % (ref 10–15)
GFR SERPL CREATININE-BSD FRML MDRD: 44 ML/MIN/{1.73_M2}
GFR SERPL CREATININE-BSD FRML MDRD: 54 ML/MIN/{1.73_M2}
GLUCOSE SERPL-MCNC: 156 MG/DL (ref 70–99)
GLUCOSE SERPL-MCNC: 92 MG/DL (ref 70–99)
GLUCOSE UR STRIP-MCNC: NEGATIVE MG/DL
HBA1C MFR BLD: 6 % (ref 0–5.6)
HCT VFR BLD AUTO: 46.4 % (ref 35–47)
HCT VFR BLD AUTO: 48.9 % (ref 35–47)
HDLC SERPL-MCNC: 77 MG/DL
HGB BLD-MCNC: 15 G/DL (ref 11.7–15.7)
HGB BLD-MCNC: 15.5 G/DL (ref 11.7–15.7)
HGB UR QL STRIP: NEGATIVE
IMM GRANULOCYTES # BLD: 0.1 10E9/L (ref 0–0.4)
IMM GRANULOCYTES NFR BLD: 0.4 %
INTERPRETATION ECG - MUSE: NORMAL
KETONES UR STRIP-MCNC: NEGATIVE MG/DL
LDLC SERPL CALC-MCNC: 65 MG/DL
LEUKOCYTE ESTERASE UR QL STRIP: ABNORMAL
LYMPHOCYTES # BLD AUTO: 0.4 10E9/L (ref 0.8–5.3)
LYMPHOCYTES NFR BLD AUTO: 3.2 %
MCH RBC QN AUTO: 31.8 PG (ref 26.5–33)
MCH RBC QN AUTO: 31.9 PG (ref 26.5–33)
MCHC RBC AUTO-ENTMCNC: 31.7 G/DL (ref 31.5–36.5)
MCHC RBC AUTO-ENTMCNC: 32.3 G/DL (ref 31.5–36.5)
MCV RBC AUTO: 101 FL (ref 78–100)
MCV RBC AUTO: 99 FL (ref 78–100)
MONOCYTES # BLD AUTO: 0.6 10E9/L (ref 0–1.3)
MONOCYTES NFR BLD AUTO: 4.3 %
NEUTROPHILS # BLD AUTO: 12.5 10E9/L (ref 1.6–8.3)
NEUTROPHILS NFR BLD AUTO: 92 %
NITRATE UR QL: NEGATIVE
NONHDLC SERPL-MCNC: 109 MG/DL
NRBC # BLD AUTO: 0 10*3/UL
NRBC BLD AUTO-RTO: 0 /100
PH UR STRIP: 6 PH (ref 5–7)
PLATELET # BLD AUTO: 142 10E9/L (ref 150–450)
PLATELET # BLD AUTO: 188 10E9/L (ref 150–450)
POTASSIUM SERPL-SCNC: 4.2 MMOL/L (ref 3.4–5.3)
POTASSIUM SERPL-SCNC: 4.7 MMOL/L (ref 3.4–5.3)
PROT SERPL-MCNC: 6.1 G/DL (ref 6.8–8.8)
RBC # BLD AUTO: 4.71 10E12/L (ref 3.8–5.2)
RBC # BLD AUTO: 4.86 10E12/L (ref 3.8–5.2)
RBC #/AREA URNS AUTO: 0 /HPF (ref 0–2)
SODIUM SERPL-SCNC: 135 MMOL/L (ref 133–144)
SODIUM SERPL-SCNC: 139 MMOL/L (ref 133–144)
SOURCE: ABNORMAL
SP GR UR STRIP: 1.01 (ref 1–1.03)
TRIGL SERPL-MCNC: 222 MG/DL
TROPONIN I SERPL-MCNC: 0.02 UG/L (ref 0–0.04)
TROPONIN I SERPL-MCNC: <0.015 UG/L (ref 0–0.04)
UROBILINOGEN UR STRIP-MCNC: NORMAL MG/DL (ref 0–2)
WBC # BLD AUTO: 10.2 10E9/L (ref 4–11)
WBC # BLD AUTO: 13.6 10E9/L (ref 4–11)
WBC #/AREA URNS AUTO: 2 /HPF (ref 0–5)

## 2020-01-01 PROCEDURE — 84484 ASSAY OF TROPONIN QUANT: CPT | Performed by: EMERGENCY MEDICINE

## 2020-01-01 PROCEDURE — 97166 OT EVAL MOD COMPLEX 45 MIN: CPT | Mod: GO

## 2020-01-01 PROCEDURE — 97116 GAIT TRAINING THERAPY: CPT | Mod: GP

## 2020-01-01 PROCEDURE — 99213 OFFICE O/P EST LOW 20 MIN: CPT | Performed by: INTERNAL MEDICINE

## 2020-01-01 PROCEDURE — 99221 1ST HOSP IP/OBS SF/LOW 40: CPT | Mod: GC | Performed by: PSYCHIATRY & NEUROLOGY

## 2020-01-01 PROCEDURE — 25000132 ZZH RX MED GY IP 250 OP 250 PS 637: Mod: GY | Performed by: INTERNAL MEDICINE

## 2020-01-01 PROCEDURE — 84484 ASSAY OF TROPONIN QUANT: CPT | Performed by: INTERNAL MEDICINE

## 2020-01-01 PROCEDURE — 99232 SBSQ HOSP IP/OBS MODERATE 35: CPT | Performed by: HOSPITALIST

## 2020-01-01 PROCEDURE — 25000125 ZZHC RX 250: Performed by: EMERGENCY MEDICINE

## 2020-01-01 PROCEDURE — 85027 COMPLETE CBC AUTOMATED: CPT | Performed by: INTERNAL MEDICINE

## 2020-01-01 PROCEDURE — G2012 BRIEF CHECK IN BY MD/QHP: HCPCS | Performed by: INTERNAL MEDICINE

## 2020-01-01 PROCEDURE — 25000128 H RX IP 250 OP 636: Performed by: EMERGENCY MEDICINE

## 2020-01-01 PROCEDURE — 83036 HEMOGLOBIN GLYCOSYLATED A1C: CPT | Performed by: EMERGENCY MEDICINE

## 2020-01-01 PROCEDURE — 70450 CT HEAD/BRAIN W/O DYE: CPT

## 2020-01-01 PROCEDURE — 97535 SELF CARE MNGMENT TRAINING: CPT | Mod: GO

## 2020-01-01 PROCEDURE — 12000000 ZZH R&B MED SURG/OB

## 2020-01-01 PROCEDURE — 25800030 ZZH RX IP 258 OP 636: Performed by: INTERNAL MEDICINE

## 2020-01-01 PROCEDURE — 85025 COMPLETE CBC W/AUTO DIFF WBC: CPT | Performed by: EMERGENCY MEDICINE

## 2020-01-01 PROCEDURE — 36415 COLL VENOUS BLD VENIPUNCTURE: CPT | Performed by: INTERNAL MEDICINE

## 2020-01-01 PROCEDURE — 93306 TTE W/DOPPLER COMPLETE: CPT | Mod: 26 | Performed by: INTERNAL MEDICINE

## 2020-01-01 PROCEDURE — 97535 SELF CARE MNGMENT TRAINING: CPT | Mod: GO | Performed by: OCCUPATIONAL THERAPIST

## 2020-01-01 PROCEDURE — 99214 OFFICE O/P EST MOD 30 MIN: CPT | Performed by: INTERNAL MEDICINE

## 2020-01-01 PROCEDURE — 72156 MRI NECK SPINE W/O & W/DYE: CPT

## 2020-01-01 PROCEDURE — 25000132 ZZH RX MED GY IP 250 OP 250 PS 637: Mod: GY | Performed by: HOSPITALIST

## 2020-01-01 PROCEDURE — 99223 1ST HOSP IP/OBS HIGH 75: CPT | Mod: AI | Performed by: INTERNAL MEDICINE

## 2020-01-01 PROCEDURE — 97530 THERAPEUTIC ACTIVITIES: CPT | Mod: GP

## 2020-01-01 PROCEDURE — 80076 HEPATIC FUNCTION PANEL: CPT | Performed by: EMERGENCY MEDICINE

## 2020-01-01 PROCEDURE — 92610 EVALUATE SWALLOWING FUNCTION: CPT | Mod: GN | Performed by: SPEECH-LANGUAGE PATHOLOGIST

## 2020-01-01 PROCEDURE — 80048 BASIC METABOLIC PNL TOTAL CA: CPT | Performed by: EMERGENCY MEDICINE

## 2020-01-01 PROCEDURE — A9585 GADOBUTROL INJECTION: HCPCS | Performed by: INTERNAL MEDICINE

## 2020-01-01 PROCEDURE — 99285 EMERGENCY DEPT VISIT HI MDM: CPT | Mod: 25

## 2020-01-01 PROCEDURE — 99232 SBSQ HOSP IP/OBS MODERATE 35: CPT | Mod: GC | Performed by: PSYCHIATRY & NEUROLOGY

## 2020-01-01 PROCEDURE — 93005 ELECTROCARDIOGRAM TRACING: CPT

## 2020-01-01 PROCEDURE — 70553 MRI BRAIN STEM W/O & W/DYE: CPT

## 2020-01-01 PROCEDURE — 80061 LIPID PANEL: CPT | Performed by: EMERGENCY MEDICINE

## 2020-01-01 PROCEDURE — 99239 HOSP IP/OBS DSCHRG MGMT >30: CPT | Performed by: HOSPITALIST

## 2020-01-01 PROCEDURE — 93306 TTE W/DOPPLER COMPLETE: CPT

## 2020-01-01 PROCEDURE — 80048 BASIC METABOLIC PNL TOTAL CA: CPT | Performed by: INTERNAL MEDICINE

## 2020-01-01 PROCEDURE — 97112 NEUROMUSCULAR REEDUCATION: CPT | Mod: GP

## 2020-01-01 PROCEDURE — 97161 PT EVAL LOW COMPLEX 20 MIN: CPT | Mod: GP

## 2020-01-01 PROCEDURE — 81001 URINALYSIS AUTO W/SCOPE: CPT | Performed by: STUDENT IN AN ORGANIZED HEALTH CARE EDUCATION/TRAINING PROGRAM

## 2020-01-01 PROCEDURE — 92526 ORAL FUNCTION THERAPY: CPT | Mod: GN | Performed by: SPEECH-LANGUAGE PATHOLOGIST

## 2020-01-01 PROCEDURE — 25500064 ZZH RX 255 OP 636: Performed by: INTERNAL MEDICINE

## 2020-01-01 PROCEDURE — 99207 ZZC APP CREDIT; MD BILLING SHARED VISIT: CPT | Performed by: INTERNAL MEDICINE

## 2020-01-01 PROCEDURE — 70496 CT ANGIOGRAPHY HEAD: CPT

## 2020-01-01 RX ORDER — SIMVASTATIN 20 MG
20 TABLET ORAL AT BEDTIME
Status: DISCONTINUED | OUTPATIENT
Start: 2020-01-01 | End: 2020-01-01 | Stop reason: HOSPADM

## 2020-01-01 RX ORDER — SODIUM CHLORIDE 9 MG/ML
INJECTION, SOLUTION INTRAVENOUS CONTINUOUS
Status: DISCONTINUED | OUTPATIENT
Start: 2020-01-01 | End: 2020-01-01

## 2020-01-01 RX ORDER — LABETALOL HYDROCHLORIDE 5 MG/ML
10-40 INJECTION, SOLUTION INTRAVENOUS EVERY 10 MIN PRN
Status: DISCONTINUED | OUTPATIENT
Start: 2020-01-01 | End: 2020-01-01 | Stop reason: HOSPADM

## 2020-01-01 RX ORDER — DONEPEZIL HYDROCHLORIDE 5 MG/1
5 TABLET, FILM COATED ORAL AT BEDTIME
Qty: 90 TABLET | Refills: 3 | Status: SHIPPED | OUTPATIENT
Start: 2020-01-01

## 2020-01-01 RX ORDER — AZITHROMYCIN 250 MG/1
TABLET, FILM COATED ORAL
Qty: 6 TABLET | Refills: 0 | Status: SHIPPED | OUTPATIENT
Start: 2020-01-01 | End: 2020-01-01

## 2020-01-01 RX ORDER — POTASSIUM CHLORIDE 29.8 MG/ML
20 INJECTION INTRAVENOUS
Status: DISCONTINUED | OUTPATIENT
Start: 2020-01-01 | End: 2020-01-01

## 2020-01-01 RX ORDER — GADOBUTROL 604.72 MG/ML
4 INJECTION INTRAVENOUS ONCE
Status: COMPLETED | OUTPATIENT
Start: 2020-01-01 | End: 2020-01-01

## 2020-01-01 RX ORDER — ACETAMINOPHEN 325 MG/1
650 TABLET ORAL EVERY 4 HOURS PRN
Status: DISCONTINUED | OUTPATIENT
Start: 2020-01-01 | End: 2020-01-01 | Stop reason: HOSPADM

## 2020-01-01 RX ORDER — POTASSIUM CHLORIDE 1.5 G/1.58G
20-40 POWDER, FOR SOLUTION ORAL
Status: DISCONTINUED | OUTPATIENT
Start: 2020-01-01 | End: 2020-01-01 | Stop reason: HOSPADM

## 2020-01-01 RX ORDER — ONDANSETRON 2 MG/ML
4 INJECTION INTRAMUSCULAR; INTRAVENOUS EVERY 6 HOURS PRN
Status: DISCONTINUED | OUTPATIENT
Start: 2020-01-01 | End: 2020-01-01 | Stop reason: HOSPADM

## 2020-01-01 RX ORDER — HYDRALAZINE HYDROCHLORIDE 20 MG/ML
10-20 INJECTION INTRAMUSCULAR; INTRAVENOUS
Status: DISCONTINUED | OUTPATIENT
Start: 2020-01-01 | End: 2020-01-01 | Stop reason: HOSPADM

## 2020-01-01 RX ORDER — NALOXONE HYDROCHLORIDE 0.4 MG/ML
.1-.4 INJECTION, SOLUTION INTRAMUSCULAR; INTRAVENOUS; SUBCUTANEOUS
Status: DISCONTINUED | OUTPATIENT
Start: 2020-01-01 | End: 2020-01-01 | Stop reason: HOSPADM

## 2020-01-01 RX ORDER — ASPIRIN 300 MG/1
300 SUPPOSITORY RECTAL DAILY
Status: DISCONTINUED | OUTPATIENT
Start: 2020-01-01 | End: 2020-01-01

## 2020-01-01 RX ORDER — PREDNISONE 20 MG/1
20 TABLET ORAL 2 TIMES DAILY
Qty: 20 TABLET | Refills: 0 | Status: SHIPPED | OUTPATIENT
Start: 2020-01-01 | End: 2020-01-01

## 2020-01-01 RX ORDER — AMOXICILLIN 250 MG
2 CAPSULE ORAL 2 TIMES DAILY PRN
Status: DISCONTINUED | OUTPATIENT
Start: 2020-01-01 | End: 2020-01-01 | Stop reason: HOSPADM

## 2020-01-01 RX ORDER — CODEINE PHOSPHATE/GUAIFENESIN 10-100MG/5
5 LIQUID (ML) ORAL EVERY 4 HOURS PRN
Qty: 180 ML | Refills: 1 | Status: SHIPPED | OUTPATIENT
Start: 2020-01-01 | End: 2020-01-01

## 2020-01-01 RX ORDER — POTASSIUM CHLORIDE 7.45 MG/ML
10 INJECTION INTRAVENOUS
Status: DISCONTINUED | OUTPATIENT
Start: 2020-01-01 | End: 2020-01-01 | Stop reason: HOSPADM

## 2020-01-01 RX ORDER — AMIODARONE HYDROCHLORIDE 200 MG/1
200 TABLET ORAL DAILY
Status: DISCONTINUED | OUTPATIENT
Start: 2020-01-01 | End: 2020-01-01 | Stop reason: HOSPADM

## 2020-01-01 RX ORDER — LIDOCAINE 40 MG/G
CREAM TOPICAL
Status: DISCONTINUED | OUTPATIENT
Start: 2020-01-01 | End: 2020-01-01 | Stop reason: HOSPADM

## 2020-01-01 RX ORDER — PROCHLORPERAZINE MALEATE 5 MG
5 TABLET ORAL EVERY 6 HOURS PRN
Status: DISCONTINUED | OUTPATIENT
Start: 2020-01-01 | End: 2020-01-01 | Stop reason: HOSPADM

## 2020-01-01 RX ORDER — BENZONATATE 100 MG/1
100 CAPSULE ORAL 3 TIMES DAILY PRN
Qty: 30 CAPSULE | Refills: 0 | Status: SHIPPED | OUTPATIENT
Start: 2020-01-01 | End: 2020-01-01

## 2020-01-01 RX ORDER — LISINOPRIL 5 MG/1
5 TABLET ORAL DAILY
Qty: 90 TABLET | Refills: 3 | Status: SHIPPED | OUTPATIENT
Start: 2020-01-01 | End: 2020-01-01

## 2020-01-01 RX ORDER — SIMVASTATIN 20 MG
20 TABLET ORAL AT BEDTIME
Qty: 90 TABLET | Refills: 3 | Status: SHIPPED | OUTPATIENT
Start: 2020-01-01 | End: 2020-01-01

## 2020-01-01 RX ORDER — LISINOPRIL 2.5 MG/1
5 TABLET ORAL DAILY
Status: DISCONTINUED | OUTPATIENT
Start: 2020-01-01 | End: 2020-01-01 | Stop reason: HOSPADM

## 2020-01-01 RX ORDER — MAGNESIUM SULFATE HEPTAHYDRATE 40 MG/ML
4 INJECTION, SOLUTION INTRAVENOUS EVERY 4 HOURS PRN
Status: DISCONTINUED | OUTPATIENT
Start: 2020-01-01 | End: 2020-01-01 | Stop reason: HOSPADM

## 2020-01-01 RX ORDER — POTASSIUM CL/LIDO/0.9 % NACL 10MEQ/0.1L
10 INTRAVENOUS SOLUTION, PIGGYBACK (ML) INTRAVENOUS
Status: DISCONTINUED | OUTPATIENT
Start: 2020-01-01 | End: 2020-01-01 | Stop reason: HOSPADM

## 2020-01-01 RX ORDER — PREDNISONE 20 MG/1
20 TABLET ORAL 2 TIMES DAILY
Status: ON HOLD | COMMUNITY
End: 2020-01-01

## 2020-01-01 RX ORDER — IOPAMIDOL 755 MG/ML
70 INJECTION, SOLUTION INTRAVASCULAR ONCE
Status: COMPLETED | OUTPATIENT
Start: 2020-01-01 | End: 2020-01-01

## 2020-01-01 RX ORDER — AMOXICILLIN 250 MG
1 CAPSULE ORAL 2 TIMES DAILY PRN
Status: DISCONTINUED | OUTPATIENT
Start: 2020-01-01 | End: 2020-01-01 | Stop reason: HOSPADM

## 2020-01-01 RX ORDER — ONDANSETRON 4 MG/1
4 TABLET, ORALLY DISINTEGRATING ORAL EVERY 6 HOURS PRN
Status: DISCONTINUED | OUTPATIENT
Start: 2020-01-01 | End: 2020-01-01 | Stop reason: HOSPADM

## 2020-01-01 RX ORDER — PREDNISONE 20 MG/1
20 TABLET ORAL 3 TIMES DAILY
Qty: 21 TABLET | Refills: 0 | Status: SHIPPED | OUTPATIENT
Start: 2020-01-01 | End: 2020-01-01

## 2020-01-01 RX ORDER — POLYETHYLENE GLYCOL 3350 17 G/17G
17 POWDER, FOR SOLUTION ORAL DAILY PRN
Status: DISCONTINUED | OUTPATIENT
Start: 2020-01-01 | End: 2020-01-01 | Stop reason: HOSPADM

## 2020-01-01 RX ORDER — POTASSIUM CHLORIDE 1500 MG/1
20-40 TABLET, EXTENDED RELEASE ORAL
Status: DISCONTINUED | OUTPATIENT
Start: 2020-01-01 | End: 2020-01-01 | Stop reason: HOSPADM

## 2020-01-01 RX ORDER — BISACODYL 10 MG
10 SUPPOSITORY, RECTAL RECTAL DAILY PRN
Status: DISCONTINUED | OUTPATIENT
Start: 2020-01-01 | End: 2020-01-01 | Stop reason: HOSPADM

## 2020-01-01 RX ORDER — PROCHLORPERAZINE 25 MG
12.5 SUPPOSITORY, RECTAL RECTAL EVERY 12 HOURS PRN
Status: DISCONTINUED | OUTPATIENT
Start: 2020-01-01 | End: 2020-01-01 | Stop reason: HOSPADM

## 2020-01-01 RX ADMIN — LISINOPRIL 5 MG: 2.5 TABLET ORAL at 09:29

## 2020-01-01 RX ADMIN — IOPAMIDOL 70 ML: 755 INJECTION, SOLUTION INTRAVENOUS at 05:56

## 2020-01-01 RX ADMIN — SODIUM CHLORIDE 90 ML: 9 INJECTION, SOLUTION INTRAVENOUS at 05:56

## 2020-01-01 RX ADMIN — GADOBUTROL 4 ML: 604.72 INJECTION INTRAVENOUS at 20:29

## 2020-01-01 RX ADMIN — APIXABAN 2.5 MG: 2.5 TABLET, FILM COATED ORAL at 21:21

## 2020-01-01 RX ADMIN — SODIUM CHLORIDE: 9 INJECTION, SOLUTION INTRAVENOUS at 10:45

## 2020-01-01 RX ADMIN — Medication 1 LOZENGE: at 23:48

## 2020-01-01 RX ADMIN — SIMVASTATIN 20 MG: 20 TABLET, FILM COATED ORAL at 21:21

## 2020-01-01 RX ADMIN — APIXABAN 2.5 MG: 2.5 TABLET, FILM COATED ORAL at 09:29

## 2020-01-01 RX ADMIN — APIXABAN 2.5 MG: 2.5 TABLET, FILM COATED ORAL at 09:39

## 2020-01-01 RX ADMIN — AMIODARONE HYDROCHLORIDE 200 MG: 200 TABLET ORAL at 09:29

## 2020-01-01 RX ADMIN — ASPIRIN 325 MG: 325 TABLET, COATED ORAL at 13:08

## 2020-01-01 RX ADMIN — AMIODARONE HYDROCHLORIDE 200 MG: 200 TABLET ORAL at 17:15

## 2020-01-01 RX ADMIN — APIXABAN 2.5 MG: 2.5 TABLET, FILM COATED ORAL at 00:28

## 2020-01-01 RX ADMIN — Medication 1 LOZENGE: at 03:03

## 2020-01-01 RX ADMIN — SIMVASTATIN 20 MG: 20 TABLET, FILM COATED ORAL at 22:14

## 2020-01-01 ASSESSMENT — ENCOUNTER SYMPTOMS
SORE THROAT: 0
FEVER: 0
SINUS PAIN: 0
DIZZINESS: 1
COUGH: 1
FATIGUE: 1
MYALGIAS: 0
WOUND: 1
SINUS PRESSURE: 0
DIARRHEA: 0
ARTHRALGIAS: 0
NECK PAIN: 0
LIGHT-HEADEDNESS: 1
SHORTNESS OF BREATH: 1
NECK STIFFNESS: 0
MYALGIAS: 0
VOMITING: 0
ABDOMINAL PAIN: 0
NAUSEA: 0

## 2020-01-01 ASSESSMENT — MIFFLIN-ST. JEOR
SCORE: 818.7
SCORE: 776.52
SCORE: 782.42
SCORE: 805.1
SCORE: 819.61
SCORE: 797.84
SCORE: 805.1

## 2020-01-01 ASSESSMENT — ACTIVITIES OF DAILY LIVING (ADL)
ADLS_ACUITY_SCORE: 14
PRIOR_FUNCTIONAL_LEVEL_COMMENT: IND
ADLS_ACUITY_SCORE: 14
ADLS_ACUITY_SCORE: 14
ADLS_ACUITY_SCORE: 16
SWALLOWING: 0-->SWALLOWS FOODS/LIQUIDS WITHOUT DIFFICULTY
NUMBER_OF_TIMES_PATIENT_HAS_FALLEN_WITHIN_LAST_SIX_MONTHS: 6
ADLS_ACUITY_SCORE: 16
ADLS_ACUITY_SCORE: 16
ADLS_ACUITY_SCORE: 14
ADLS_ACUITY_SCORE: 17
ADLS_ACUITY_SCORE: 16
ADLS_ACUITY_SCORE: 14
ADLS_ACUITY_SCORE: 14
ADLS_ACUITY_SCORE: 16
ADLS_ACUITY_SCORE: 16

## 2020-01-02 NOTE — PROGRESS NOTES
"Chief Complaint:       Polly De La Torre is a 88 year old female who presents to clinic today for the following health issues:    Falls      HPI:   Patient Polly De La Torre is a very pleasant 88 year old female with history of falls who presents to Internal Medicine clinic today for recent falls due to advanced age and generalized muscle weakness. Recent history of falls, has been feeling like things have been getting better but had fallen twice in one day      Current Medications:     Current Outpatient Medications   Medication Sig Dispense Refill     amiodarone (PACERONE/CODARONE) 200 MG tablet TAKE 1 TABLET DAILY 30 tablet 12     apixaban ANTICOAGULANT (ELIQUIS) 2.5 MG tablet Take 1 tablet (2.5 mg) by mouth 2 times daily 180 tablet 3     lisinopril (PRINIVIL/ZESTRIL) 5 MG tablet Take 1 tablet (5 mg) by mouth daily 90 tablet 3     order for DME Equipment being ordered: walking cane 1 each 3     simvastatin (ZOCOR) 20 MG tablet Take 1 tablet (20 mg) by mouth At Bedtime 90 tablet 3         Allergies:      Allergies   Allergen Reactions     Cetirizine      Made her so tired after taking doesn't want to take again,just can't live that way([employer])     Darvon [Propoxyphene]      Methadone      DARVON  ---  \"crawling\" sensation             Past Medical History:     Past Medical History:   Diagnosis Date     Aortic regurgitation 2015     Aortic valve disorders      Choledocholithiasis     s/p ERCP x 3 2011, ERCP Jan 2014, Dr. Krishnan     Esophageal reflux      Gallstone pancreatitis      Osteoarthrosis, unspecified whether generalized or localized, unspecified site      Osteopenia          Past Surgical History:     Past Surgical History:   Procedure Laterality Date     C NONSPECIFIC PROCEDURE  age 16    Appy     C NONSPECIFIC PROCEDURE      D&C x 2     CHOLECYSTECTOMY  1995    [facility]         Family Medical History:     Family History   Problem Relation Age of Onset     Hypertension Father      " Cerebrovascular Disease Father      Allergies Son      Prostate Cancer No family hx of      Cancer - colorectal No family hx of          Social History:     Social History     Socioeconomic History     Marital status:      Spouse name: Not on file     Number of children: Not on file     Years of education: Not on file     Highest education level: Not on file   Occupational History     Not on file   Social Needs     Financial resource strain: Not on file     Food insecurity:     Worry: Not on file     Inability: Not on file     Transportation needs:     Medical: Not on file     Non-medical: Not on file   Tobacco Use     Smoking status: Never Smoker     Smokeless tobacco: Never Used   Substance and Sexual Activity     Alcohol use: Yes     Alcohol/week: 0.0 standard drinks     Comment: rarely     Drug use: No     Sexual activity: Yes     Partners: Male   Lifestyle     Physical activity:     Days per week: Not on file     Minutes per session: Not on file     Stress: Not on file   Relationships     Social connections:     Talks on phone: Not on file     Gets together: Not on file     Attends Sikhism service: Not on file     Active member of club or organization: Not on file     Attends meetings of clubs or organizations: Not on file     Relationship status: Not on file     Intimate partner violence:     Fear of current or ex partner: Not on file     Emotionally abused: Not on file     Physically abused: Not on file     Forced sexual activity: Not on file   Other Topics Concern     Parent/sibling w/ CABG, MI or angioplasty before 65F 55M? Not Asked   Social History Narrative     with 3 kids           Review of System:     Constitutional: Negative for fever or chills  Skin: Negative for rashes  Ears/Nose/Throat: Negative for nasal congestion, sore throat  Respiratory: No shortness of breath, dyspnea on exertion, cough, or hemoptysis  Cardiovascular: Negative for chest pain  Gastrointestinal: Negative for  nausea, vomiting  Genitourinary: Negative for dysuria, hematuria  Musculoskeletal: positive for recent falls  Neurologic: Negative for headaches  Psychiatric: Negative for depression, anxiety  Hematologic/Lymphatic/Immunologic: Negative  Endocrine: Negative  Behavioral: Negative for tobacco use       Physical Exam:   /66 (BP Location: Right arm, Patient Position: Sitting, Cuff Size: Adult Regular)   Pulse 64   Temp 96.7  F (35.9  C) (Oral)   Ht 1.524 m (5')   Wt 46.8 kg (103 lb 3.2 oz)   SpO2 98%   BMI 20.15 kg/m      GENERAL: alert and no distress  EYES: eyes grossly normal to inspection, and conjunctivae and sclerae normal  HENT: Normocephalic atraumatic. Nose and mouth without ulcers or lesions  NECK: supple  RESP: lungs clear to auscultation   CV: regular rate and rhythm, normal S1 S2  LYMPH: no peripheral edema   ABDOMEN: nondistended  MS: diffuse muscle weakness noted  SKIN: no suspicious lesions or rashes  NEURO: Alert & Oriented x 3.   PSYCH: mentation appears normal, affect normal        Diagnostic Test Results:     Diagnostic Test Results:  No results found for any visits on 01/02/20.    ASSESSMENT/PLAN:   (Z71.89) Advanced care planning/counseling discussion  (M62.81) Generalized muscle weakness  (R29.6) Falls frequently  (primary encounter diagnosis)  Comment: recent falls due to advanced age and generalized muscle weakness  Plan: [facility] REFERRAL, order         for DME for a walking cane      Follow Up Plan:     Patient is instructed to return to Internal Medicine clinic for follow-up visit in 1 month.        Tarsha Merritt MD  Internal Medicine  [facility]

## 2020-02-14 NOTE — PROGRESS NOTES
"  Chief Complaint:       Polly De La Torre is a 88 year old female who presents to clinic today for the following health issues:       ED/UC Followup:    Facility:  [facility]  Date of visit: 02/04/2020  Reason for visit: URI           HPI:   Patient Polly De La Torre is a very pleasant 88 year old female with history of recent URI who presents to Internal Medicine clinic today for follow up of recent URI symptoms.  Current Status: Cough and fatigue still present. Feels like symptoms just aren't getting any better since starting antibiotics          Current Medications:     Current Outpatient Medications   Medication Sig Dispense Refill     amiodarone (PACERONE/CODARONE) 200 MG tablet TAKE 1 TABLET DAILY 30 tablet 12     apixaban ANTICOAGULANT (ELIQUIS) 2.5 MG tablet Take 1 tablet (2.5 mg) by mouth 2 times daily 180 tablet 3     azithromycin (ZITHROMAX) 250 MG tablet Take 2 tablets (500 mg) by mouth daily for 1 day, THEN 1 tablet (250 mg) daily for 4 days. 6 tablet 0     benzonatate (TESSALON) 100 MG capsule Take 1 capsule (100 mg) by mouth 3 times daily as needed for cough 30 capsule 0     lisinopril (PRINIVIL/ZESTRIL) 5 MG tablet Take 1 tablet (5 mg) by mouth daily 90 tablet 3     order for DME Equipment being ordered: walking cane 1 each 3     predniSONE (DELTASONE) 20 MG tablet Take 1 tablet (20 mg) by mouth 2 times daily 20 tablet 0     simvastatin (ZOCOR) 20 MG tablet Take 1 tablet (20 mg) by mouth At Bedtime 90 tablet 3         Allergies:      Allergies   Allergen Reactions     Cetirizine      Made her so tired after taking doesn't want to take again,just can't live that way([employer])     Darvon [Propoxyphene]      Methadone      DARVON  ---  \"crawling\" sensation             Past Medical History:     Past Medical History:   Diagnosis Date     Aortic regurgitation 2015     Aortic valve disorders      Choledocholithiasis     s/p ERCP x 3 2011, ERCP Jan 2014, Dr. Krishnan     " Esophageal reflux      Gallstone pancreatitis      Osteoarthrosis, unspecified whether generalized or localized, unspecified site      Osteopenia          Past Surgical History:     Past Surgical History:   Procedure Laterality Date     C NONSPECIFIC PROCEDURE  age 16    Appy     C NONSPECIFIC PROCEDURE      D&C x 2     CHOLECYSTECTOMY  1995    [facility]         Family Medical History:     Family History   Problem Relation Age of Onset     Hypertension Father      Cerebrovascular Disease Father      Allergies Son      Prostate Cancer No family hx of      Cancer - colorectal No family hx of          Social History:     Social History     Socioeconomic History     Marital status:      Spouse name: Not on file     Number of children: Not on file     Years of education: Not on file     Highest education level: Not on file   Occupational History     Not on file   Social Needs     Financial resource strain: Not on file     Food insecurity:     Worry: Not on file     Inability: Not on file     Transportation needs:     Medical: Not on file     Non-medical: Not on file   Tobacco Use     Smoking status: Never Smoker     Smokeless tobacco: Never Used   Substance and Sexual Activity     Alcohol use: Yes     Alcohol/week: 0.0 standard drinks     Comment: rarely     Drug use: No     Sexual activity: Yes     Partners: Male   Lifestyle     Physical activity:     Days per week: Not on file     Minutes per session: Not on file     Stress: Not on file   Relationships     Social connections:     Talks on phone: Not on file     Gets together: Not on file     Attends Zoroastrianism service: Not on file     Active member of club or organization: Not on file     Attends meetings of clubs or organizations: Not on file     Relationship status: Not on file     Intimate partner violence:     Fear of current or ex partner: Not on file     Emotionally abused: Not on file     Physically abused: Not on file     Forced sexual activity: Not on  file   Other Topics Concern     Parent/sibling w/ CABG, MI or angioplasty before 65F 55M? Not Asked   Social History Narrative     with 3 kids           Review of System:     Constitutional: Negative for fever or chills  Skin: Negative for rashes  Ears/Nose/Throat: positive for nasal congestion, sore throat  Respiratory: positive for cough  Cardiovascular: Negative for chest pain  Gastrointestinal: Negative for nausea, vomiting  Genitourinary: Negative for dysuria, hematuria  Musculoskeletal: Negative for myalgias  Neurologic: Negative for headaches  Psychiatric: Negative for depression, anxiety  Hematologic/Lymphatic/Immunologic: Negative  Endocrine: Negative  Behavioral: Negative for tobacco use       Physical Exam:   /69 (BP Location: Right arm, Patient Position: Sitting, Cuff Size: Adult Regular)   Pulse 79   Temp 97.3  F (36.3  C) (Oral)   Ht 1.524 m (5')   Wt 46.7 kg (103 lb)   SpO2 92%   BMI 20.12 kg/m      GENERAL: chronically ill appearing elderly female, alert and no acute distress  EYES: eyes grossly normal to inspection, and conjunctivae and sclerae normal  HENT: Normocephalic atraumatic. Nose and mouth without ulcers or lesions, nasal congestion present  NECK: supple  RESP: intermittent dry coughing spells present  CV: regular rate and rhythm, normal S1 S2  LYMPH: no peripheral edema   ABDOMEN: nondistended  MS: no gross musculoskeletal defects noted  SKIN: no suspicious lesions or rashes  NEURO: Alert & Oriented x 3.   PSYCH: mentation appears normal, affect normal        Diagnostic Test Results:     Diagnostic Test Results:  No results found for any visits on 02/14/20.    ASSESSMENT/PLAN:     (R05) Cough  (J06.9) Upper respiratory tract infection, unspecified type  (primary encounter diagnosis)  Comment: acute URI symptoms with cough  Plan: I have added predniSONE (DELTASONE) 20 MG tablet 2xDay,  Continue current azithromycin (ZITHROMAX) 250 MG tablet      Follow Up Plan:      Patient is instructed to return to Internal Medicine clinic for follow-up visit in 3 days.        Tarsha Merritt MD  Internal Medicine  [facility]

## 2020-02-17 NOTE — PROGRESS NOTES
"      Chief Complaint:       Polly De La Torre is a 88 year old female who presents to clinic today for the following health issues:      Follow up on URI, cough      HPI:   Patient Polly De La Torre is a very pleasant 88 year old female with history of recent URI who presents to Internal Medicine clinic today for follow up of recent URI symptoms.  Current Status: Cough and fatigue mildly improved since last clinic visit, although symptoms still present. No fever or chills at this time.        Current Medications:     Current Outpatient Medications   Medication Sig Dispense Refill     amiodarone (PACERONE/CODARONE) 200 MG tablet TAKE 1 TABLET DAILY 30 tablet 12     apixaban ANTICOAGULANT (ELIQUIS) 2.5 MG tablet Take 1 tablet (2.5 mg) by mouth 2 times daily 180 tablet 3     azithromycin (ZITHROMAX) 250 MG tablet Take 2 tablets (500 mg) by mouth daily for 1 day, THEN 1 tablet (250 mg) daily for 4 days. 6 tablet 0     benzonatate (TESSALON) 100 MG capsule Take 1 capsule (100 mg) by mouth 3 times daily as needed for cough 30 capsule 0     guaiFENesin-codeine (GUAIFENESIN AC) 100-10 MG/5ML syrup Take 5 mLs by mouth every 4 hours as needed for congestion 180 mL 1     lisinopril (PRINIVIL/ZESTRIL) 5 MG tablet Take 1 tablet (5 mg) by mouth daily 90 tablet 3     order for DME Equipment being ordered: walking cane 1 each 3     predniSONE (DELTASONE) 20 MG tablet Take 1 tablet (20 mg) by mouth 2 times daily 20 tablet 0     simvastatin (ZOCOR) 20 MG tablet Take 1 tablet (20 mg) by mouth At Bedtime 90 tablet 3         Allergies:      Allergies   Allergen Reactions     Cetirizine      Made her so tired after taking doesn't want to take again,just can't live that way([employer])     Darvon [Propoxyphene]      Methadone      DARVON  ---  \"crawling\" sensation             Past Medical History:     Past Medical History:   Diagnosis Date     Aortic regurgitation 2015     Aortic valve disorders      Choledocholithiasis     s/p " ERCP x 3 2011, ERCP Jan 2014, Dr. Krishnan     Esophageal reflux      Gallstone pancreatitis      Osteoarthrosis, unspecified whether generalized or localized, unspecified site      Osteopenia          Past Surgical History:     Past Surgical History:   Procedure Laterality Date     C NONSPECIFIC PROCEDURE  age 16    Appy     C NONSPECIFIC PROCEDURE      D&C x 2     CHOLECYSTECTOMY  1995    [facility]         Family Medical History:     Family History   Problem Relation Age of Onset     Hypertension Father      Cerebrovascular Disease Father      Allergies Son      Prostate Cancer No family hx of      Cancer - colorectal No family hx of          Social History:     Social History     Socioeconomic History     Marital status:      Spouse name: Not on file     Number of children: Not on file     Years of education: Not on file     Highest education level: Not on file   Occupational History     Not on file   Social Needs     Financial resource strain: Not on file     Food insecurity:     Worry: Not on file     Inability: Not on file     Transportation needs:     Medical: Not on file     Non-medical: Not on file   Tobacco Use     Smoking status: Never Smoker     Smokeless tobacco: Never Used   Substance and Sexual Activity     Alcohol use: Yes     Alcohol/week: 0.0 standard drinks     Comment: rarely     Drug use: No     Sexual activity: Yes     Partners: Male   Lifestyle     Physical activity:     Days per week: Not on file     Minutes per session: Not on file     Stress: Not on file   Relationships     Social connections:     Talks on phone: Not on file     Gets together: Not on file     Attends Catholic service: Not on file     Active member of club or organization: Not on file     Attends meetings of clubs or organizations: Not on file     Relationship status: Not on file     Intimate partner violence:     Fear of current or ex partner: Not on file     Emotionally abused: Not on file     Physically abused: Not  on file     Forced sexual activity: Not on file   Other Topics Concern     Parent/sibling w/ CABG, MI or angioplasty before 65F 55M? Not Asked   Social History Narrative     with 3 kids           Review of System:     Constitutional: Negative for fever or chills  Skin: Negative for rashes  Ears/Nose/Throat: positive for nasal congestion, sore throat  Respiratory: positive for cough  Cardiovascular: Negative for chest pain  Gastrointestinal: Negative for nausea, vomiting  Genitourinary: Negative for dysuria, hematuria  Musculoskeletal: Negative for myalgias  Neurologic: Negative for headaches  Psychiatric: Negative for depression, anxiety  Hematologic/Lymphatic/Immunologic: Negative  Endocrine: Negative  Behavioral: Negative for tobacco use       Physical Exam:   BP (!) 185/69 (BP Location: Right arm, Patient Position: Sitting, Cuff Size: Adult Small)   Pulse 59   Temp 97.2  F (36.2  C) (Tympanic)   Ht 1.524 m (5')   Wt 45.4 kg (100 lb)   SpO2 93%   BMI 19.53 kg/m      GENERAL: chronically ill appearing elderly female, alert and no acute distress  EYES: eyes grossly normal to inspection, and conjunctivae and sclerae normal  HENT: Normocephalic atraumatic. Nose and mouth without ulcers or lesions, nasal congestion present  NECK: supple  RESP: intermittent dry coughing spells mildly improved since last clinic visit, although still present  CV: regular rate and rhythm, normal S1 S2  LYMPH: no peripheral edema   ABDOMEN: nondistended  MS: no gross musculoskeletal defects noted  SKIN: no suspicious lesions or rashes  NEURO: Alert & Oriented x 3.   PSYCH: mentation appears normal, affect normal        Diagnostic Test Results:     Diagnostic Test Results:  No results found for any visits on 02/17/20.    ASSESSMENT/PLAN:     (R05) Cough  (J06.9) Upper respiratory tract infection, unspecified type  (primary encounter diagnosis)  Comment: 3 days follow up of recent acute URI symptoms with cough, cough symptoms  mild improved, although still present   Plan: I have added guaiFENesin-codeine (GUAIFENESIN AC) 100-10         MG/5ML syrup for cough symptoms relief. Continue current predniSONE (DELTASONE) 20 MG tablet 2xDay,  Continue current azithromycin (ZITHROMAX) 250 MG tablet therapy.      Follow Up Plan:     Patient is instructed to return to Internal Medicine clinic for follow-up visit in 3 days.        Tarsha Merritt MD  Internal Medicine  [facility]

## 2020-02-20 NOTE — PROGRESS NOTES
"  Chief Complaint:       Polly De La Torre is a 88 year old female who presents to clinic today for the following health issues:      Follow up on URI, cough      HPI:   Patient Polly De La Torre is a very pleasant 88 year old female with history of recent URI who presents to Internal Medicine clinic today for follow up of recent URI symptoms.  Current Status: Cough and fatigue mildly improved since last clinic visit, although symptoms still present. No fever or chills at this time.        Current Medications:     Current Outpatient Medications   Medication Sig Dispense Refill     amiodarone (PACERONE/CODARONE) 200 MG tablet TAKE 1 TABLET DAILY 30 tablet 12     apixaban ANTICOAGULANT (ELIQUIS) 2.5 MG tablet Take 1 tablet (2.5 mg) by mouth 2 times daily 180 tablet 3     benzonatate (TESSALON) 100 MG capsule Take 1 capsule (100 mg) by mouth 3 times daily as needed for cough 30 capsule 0     guaiFENesin-codeine (GUAIFENESIN AC) 100-10 MG/5ML syrup Take 5 mLs by mouth every 4 hours as needed for congestion 180 mL 1     lisinopril (PRINIVIL/ZESTRIL) 5 MG tablet Take 1 tablet (5 mg) by mouth daily 90 tablet 3     order for DME Equipment being ordered: walking cane 1 each 3     predniSONE (DELTASONE) 20 MG tablet Take 1 tablet (20 mg) by mouth 2 times daily 20 tablet 0     simvastatin (ZOCOR) 20 MG tablet Take 1 tablet (20 mg) by mouth At Bedtime 90 tablet 3         Allergies:      Allergies   Allergen Reactions     Cetirizine      Made her so tired after taking doesn't want to take again,just can't live that way([employer])     Darvon [Propoxyphene]      Methadone      DARVON  ---  \"crawling\" sensation             Past Medical History:     Past Medical History:   Diagnosis Date     Aortic regurgitation 2015     Aortic valve disorders      Choledocholithiasis     s/p ERCP x 3 2011, ERCP Jan 2014, Dr. Krishnan     Esophageal reflux      Gallstone pancreatitis      Osteoarthrosis, unspecified whether generalized or " localized, unspecified site      Osteopenia          Past Surgical History:     Past Surgical History:   Procedure Laterality Date     C NONSPECIFIC PROCEDURE  age 16    Appy     C NONSPECIFIC PROCEDURE      D&C x 2     CHOLECYSTECTOMY  1995    [facility]         Family Medical History:     Family History   Problem Relation Age of Onset     Hypertension Father      Cerebrovascular Disease Father      Allergies Son      Prostate Cancer No family hx of      Cancer - colorectal No family hx of          Social History:     Social History     Socioeconomic History     Marital status:      Spouse name: Not on file     Number of children: Not on file     Years of education: Not on file     Highest education level: Not on file   Occupational History     Not on file   Social Needs     Financial resource strain: Not on file     Food insecurity:     Worry: Not on file     Inability: Not on file     Transportation needs:     Medical: Not on file     Non-medical: Not on file   Tobacco Use     Smoking status: Never Smoker     Smokeless tobacco: Never Used   Substance and Sexual Activity     Alcohol use: Yes     Alcohol/week: 0.0 standard drinks     Comment: rarely     Drug use: No     Sexual activity: Yes     Partners: Male   Lifestyle     Physical activity:     Days per week: Not on file     Minutes per session: Not on file     Stress: Not on file   Relationships     Social connections:     Talks on phone: Not on file     Gets together: Not on file     Attends Catholic service: Not on file     Active member of club or organization: Not on file     Attends meetings of clubs or organizations: Not on file     Relationship status: Not on file     Intimate partner violence:     Fear of current or ex partner: Not on file     Emotionally abused: Not on file     Physically abused: Not on file     Forced sexual activity: Not on file   Other Topics Concern     Parent/sibling w/ CABG, MI or angioplasty before 65F 55M? Not Asked    Social History Narrative     with 3 kids           Review of System:     Constitutional: Negative for fever or chills  Skin: Negative for rashes  Ears/Nose/Throat: negative for nasal congestion, sore throat  Respiratory: positive for cough  Cardiovascular: Negative for chest pain  Gastrointestinal: Negative for nausea, vomiting  Genitourinary: Negative for dysuria, hematuria  Musculoskeletal: Negative for myalgias  Neurologic: Negative for headaches  Psychiatric: Negative for depression, anxiety  Hematologic/Lymphatic/Immunologic: Negative  Endocrine: Negative  Behavioral: Negative for tobacco use       Physical Exam:   BP (!) 157/69 (BP Location: Right arm, Patient Position: Sitting, Cuff Size: Adult Small)   Pulse 64   Temp 96.8  F (36  C) (Oral)   Ht 1.524 m (5')   Wt 45.4 kg (100 lb)   SpO2 91%   Breastfeeding No   BMI 19.53 kg/m      GENERAL: chronically ill appearing elderly female, alert and no acute distress  EYES: eyes grossly normal to inspection, and conjunctivae and sclerae normal  HENT: Normocephalic atraumatic. Nose and mouth without ulcers or lesions, nasal congestion no longer present  NECK: supple  RESP: intermittent dry coughing spells mildly improved since last clinic visit, although still present  CV: regular rate and rhythm, normal S1 S2  LYMPH: no peripheral edema   ABDOMEN: nondistended  MS: no gross musculoskeletal defects noted  SKIN: no suspicious lesions or rashes  NEURO: Alert & Oriented x 3.   PSYCH: mentation appears normal, affect normal        Diagnostic Test Results:     Diagnostic Test Results:  No results found for any visits on 02/20/20.    ASSESSMENT/PLAN:     (R05) Cough  (J06.9) Upper respiratory tract infection, unspecified type  (primary encounter diagnosis)  Comment: 3 days follow up of recent acute URI symptoms with cough, cough symptoms mild improved, although still present   Plan: continue current therapy with guaiFENesin-codeine (GUAIFENESIN AC) 100-10          MG/5ML syrup for cough symptoms relief. Continue current predniSONE (DELTASONE) 20 MG tablet 2xDay,  Continue current azithromycin (ZITHROMAX) 250 MG tablet therapy.      Follow Up Plan:     Patient is instructed to return to Internal Medicine clinic for follow-up visit in 3 days.        Tarsha Merritt MD  Internal Medicine  [facility]

## 2020-02-24 NOTE — PROGRESS NOTES
Patient comes in with complaint of cough and congestion of 3 days duration.  Has mild shortness of breath.      Past Medical History:   Diagnosis Date     Aortic regurgitation 2015     Aortic valve disorders      Choledocholithiasis     s/p ERCP x 3 2011, ERCP Jan 2014, Dr. Krishnan     Esophageal reflux      Gallstone pancreatitis      Osteoarthrosis, unspecified whether generalized or localized, unspecified site      Osteopenia        Review of Systems   Constitutional: Positive for fatigue. Negative for fever.   HENT: Positive for congestion. Negative for sinus pressure, sinus pain and sore throat.    Respiratory: Positive for cough and shortness of breath (mild).    Cardiovascular: Negative for chest pain.   Gastrointestinal: Negative for abdominal pain, diarrhea, nausea and vomiting.   Musculoskeletal: Negative for myalgias.   Skin: Negative for rash.       /67 (BP Location: Left arm, Patient Position: Sitting, Cuff Size: Adult Regular)   Pulse 88   Temp 97.6  F (36.4  C) (Oral)   Wt 46.7 kg (103 lb)   SpO2 94%   Breastfeeding No   BMI 20.12 kg/m        Physical Exam  Constitutional:       General: She is not in acute distress.     Appearance: She is not ill-appearing.   Pulmonary:      Effort: Pulmonary effort is normal. No respiratory distress.      Breath sounds: Rales (bibasal) present. No wheezing.   Lymphadenopathy:      Cervical: No cervical adenopathy.   Skin:     Findings: No rash.   Neurological:      Mental Status: She is alert and oriented to person, place, and time.           ICD-10-CM    1. Pneumonia of both lower lobes due to infectious organism (H) J18.1 azithromycin (ZITHROMAX) 250 MG tablet   2. Cough R05 benzonatate (TESSALON) 100 MG capsule       Patient Instructions   Follow up with your doctor if your symptoms persist/worsens, or if you develop new symptoms or side effects from the medication/s.

## 2020-02-24 NOTE — PROGRESS NOTES
"  Chief Complaint:       Polly De La Torre is a 88 year old female who presents to clinic today for the following health issues:      Follow up on URI, cough      HPI:   Patient Polly De La Torre is a very pleasant 88 year old female with history of recent URI who presents to Internal Medicine clinic today for 1 week follow up of recent URI symptoms.  Current Status: Cough and fatigue further improved since last clinic visit, although symptoms still present. No fever or chills at this time.        Current Medications:     Current Outpatient Medications   Medication Sig Dispense Refill     amiodarone (PACERONE/CODARONE) 200 MG tablet TAKE 1 TABLET DAILY 30 tablet 12     apixaban ANTICOAGULANT (ELIQUIS) 2.5 MG tablet Take 1 tablet (2.5 mg) by mouth 2 times daily 180 tablet 3     benzonatate (TESSALON) 100 MG capsule Take 1 capsule (100 mg) by mouth 3 times daily as needed for cough 30 capsule 0     lisinopril (PRINIVIL/ZESTRIL) 5 MG tablet Take 1 tablet (5 mg) by mouth daily 90 tablet 3     order for DME Equipment being ordered: walking cane 1 each 3     predniSONE (DELTASONE) 20 MG tablet Take 1 tablet (20 mg) by mouth 2 times daily 20 tablet 0     predniSONE 20 MG PO tablet Take 1 tablet (20 mg) by mouth 3 times daily for 7 days 21 tablet 0     simvastatin (ZOCOR) 20 MG tablet Take 1 tablet (20 mg) by mouth At Bedtime 90 tablet 3     guaiFENesin-codeine (GUAIFENESIN AC) 100-10 MG/5ML syrup Take 5 mLs by mouth every 4 hours as needed for congestion (Patient not taking: Reported on 2/24/2020) 180 mL 1         Allergies:      Allergies   Allergen Reactions     Cetirizine      Made her so tired after taking doesn't want to take again,just can't live that way([employer])     Darvon [Propoxyphene]      Methadone      DARVON  ---  \"crawling\" sensation             Past Medical History:     Past Medical History:   Diagnosis Date     Aortic regurgitation 2015     Aortic valve disorders      Choledocholithiasis     " s/p ERCP x 3 2011, ERCP Jan 2014, Dr. Krishnan     Esophageal reflux      Gallstone pancreatitis      Osteoarthrosis, unspecified whether generalized or localized, unspecified site      Osteopenia          Past Surgical History:     Past Surgical History:   Procedure Laterality Date     C NONSPECIFIC PROCEDURE  age 16    Appy     C NONSPECIFIC PROCEDURE      D&C x 2     CHOLECYSTECTOMY  1995    [facility] [place]         Family Medical History:     Family History   Problem Relation Age of Onset     Hypertension Father      Cerebrovascular Disease Father      Allergies Son      Prostate Cancer No family hx of      Cancer - colorectal No family hx of          Social History:     Social History     Socioeconomic History     Marital status:      Spouse name: Not on file     Number of children: Not on file     Years of education: Not on file     Highest education level: Not on file   Occupational History     Not on file   Social Needs     Financial resource strain: Not on file     Food insecurity:     Worry: Not on file     Inability: Not on file     Transportation needs:     Medical: Not on file     Non-medical: Not on file   Tobacco Use     Smoking status: Never Smoker     Smokeless tobacco: Never Used   Substance and Sexual Activity     Alcohol use: Yes     Alcohol/week: 0.0 standard drinks     Comment: rarely     Drug use: No     Sexual activity: Yes     Partners: Male   Lifestyle     Physical activity:     Days per week: Not on file     Minutes per session: Not on file     Stress: Not on file   Relationships     Social connections:     Talks on phone: Not on file     Gets together: Not on file     Attends Orthodox service: Not on file     Active member of club or organization: Not on file     Attends meetings of clubs or organizations: Not on file     Relationship status: Not on file     Intimate partner violence:     Fear of current or ex partner: Not on file     Emotionally abused: Not on file     Physically abused:  Not on file     Forced sexual activity: Not on file   Other Topics Concern     Parent/sibling w/ CABG, MI or angioplasty before 65F 55M? Not Asked   Social History Narrative     with 3 kids           Review of System:     Constitutional: Negative for fever or chills  Skin: Negative for rashes  Ears/Nose/Throat: negative for nasal congestion, sore throat  Respiratory: positive for cough  Cardiovascular: Negative for chest pain  Gastrointestinal: Negative for nausea, vomiting  Genitourinary: Negative for dysuria, hematuria  Musculoskeletal: Negative for myalgias  Neurologic: Negative for headaches  Psychiatric: Negative for depression, anxiety  Hematologic/Lymphatic/Immunologic: Negative  Endocrine: Negative  Behavioral: Negative for tobacco use       Physical Exam:   BP (!) 142/60 (BP Location: Right arm, Cuff Size: Adult Regular)   Pulse 61   Temp 96.6  F (35.9  C) (Oral)   Ht 1.524 m (5')   Wt 44.6 kg (98 lb 6.4 oz)   SpO2 99%   BMI 19.22 kg/m      GENERAL: chronically ill appearing elderly female, alert and no acute distress  EYES: eyes grossly normal to inspection, and conjunctivae and sclerae normal  HENT: Normocephalic atraumatic. Nose and mouth without ulcers or lesions, nasal congestion no longer present  NECK: supple  RESP: intermittent dry coughing spells mildly improved since last clinic visit, although still present  CV: regular rate and rhythm, normal S1 S2  LYMPH: no peripheral edema   ABDOMEN: nondistended  MS: no gross musculoskeletal defects noted  SKIN: no suspicious lesions or rashes  NEURO: Alert & Oriented x 3.   PSYCH: mentation appears normal, affect normal        Diagnostic Test Results:     Diagnostic Test Results:  No results found for any visits on 02/24/20.    ASSESSMENT/PLAN:     (R05) Cough  (J06.9) Upper respiratory tract infection, unspecified type  (primary encounter diagnosis)  Comment: 1 week follow up of recent acute URI symptoms with cough, cough symptoms further  improved, although still present   Plan: continue current therapy with guaiFENesin-codeine (GUAIFENESIN AC) 100-10         MG/5ML syrup for cough symptoms relief. Continue current predniSONE (DELTASONE) 20 MG tablet 2xDay for 1 more week        Follow Up Plan:     Patient is instructed to return to Internal Medicine clinic for follow-up visit in 1 week.        Tarsha Merritt MD  Internal Medicine  [facility]

## 2020-02-27 PROBLEM — I63.9 CVA (CEREBRAL VASCULAR ACCIDENT) (H): Status: ACTIVE | Noted: 2020-01-01

## 2020-02-27 NOTE — PROGRESS NOTES
02/27/20 1112   General Information   Onset Date 02/27/20   Start of Care Date 02/27/20   Referring Physician Akira Whittaker   Patient Profile Review/OT: Additional Occupational Profile Info See Profile for full history and prior level of function   Patient/Family Goals Statement Patient reports no problems swallowing.    Swallowing Evaluation Bedside swallow evaluation   Behaviorial Observations Alert   Mode of current nutrition NPO   Respiratory Status Room air   Comments Polly De La Torre is a 88 year old female admitted on 2/27/2020. 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   Clinical Swallow Evaluation   Oral Musculature anomalies present   Structural Abnormalities none present   Dentition present and adequate   Mucosal Quality dry   Mandibular Strength and Mobility intact   Oral Labial Strength and Mobility impaired retraction  (Left sided droop. )   Lingual Strength and Mobility impaired protrusion;impaired anterior elevation;impaired left lateral movement;impaired right lateral movement;impaired coordination   Velar Elevation intact   Buccal Strength and Mobility intact   Laryngeal Function Cough;Throat clear;Swallow;Voicing initiated;Dry swallow palpated   Swallow Eval   Feeding Assistance no assistance needed   Clinical Swallow Eval: Thin Liquid Texture Trial   Mode of Presentation, Thin Liquids cup;self-fed   Volume of Liquid or Food Presented 4 oz of water   Oral Phase of Swallow Premature pharyngeal entry   Pharyngeal Phase of Swallow impaired;throat clearing   Diagnostic Statement Minimal throat clearing x1 after a solid.    Clinical Swallow Eval: Puree Solid Texture Trial   Mode of Presentation, Puree spoon;self-fed   Volume of Puree Presented 4 oz of pudding   Oral Phase, Puree WFL   Pharyngeal Phase, Puree intact   Clinical  Swallow Eval: Solid Food Texture Trial   Mode of Presentation, Solid self-fed   Volume of Solid Food Presented 3 katheryn crackers   Oral Phase, Solid Premature pharyngeal entry;Residue in oral cavity   Oral Residue, Solid left anterior lateral sulci;mid posterior tongue   Pharyngeal Phase, Solid impaired;repeated swallows;throat clearing   Diagnostic Statement Mild left sided residue with throat clear x1.    Swallow Compensations   Swallow Compensations Alternate viscosity of consistencies   Results Oral difficulties only   General Therapy Interventions   Planned Therapy Interventions Dysphagia Treatment   Dysphagia treatment Modified diet education;Instruction of safe swallow strategies   Swallow Eval: Clinical Impressions   Skilled Criteria for Therapy Intervention Skilled criteria met.  Treatment indicated.   Functional Assessment Scale (FAS) 5   Treatment Diagnosis Mild oral and pharyngeal dysphagia   Diet texture recommendations Dysphagia diet level 3;Thin liquids   Recommended Feeding/Eating Techniques alternate between small bites and sips of food/liquid;check mouth frequently for oral residue/pocketing;maintain upright posture during/after eating for 30 mins;no straws;small sips/bites   Therapy Frequency 5x/week   Predicted Duration of Therapy Intervention (days/wks) 1 week   Anticipated Discharge Disposition other (see comments)  (Pending further work up. )   Risks and Benefits of Treatment have been explained. Yes   Patient, family and/or staff in agreement with Plan of Care Yes   Clinical Impression Comments Patient presents with mild oral and pharyngeal dysphagia at bedside secondary to a right cerebellar stroke. Deficits include: mild left facial droop, mild oral motor deficits for ROM/coordination. She demonstrated premature entry of thin liquids with mild throat clear with consecutive swallows, none with single sips. Pudding was grossly within functional limits. Mastication was mildly prolonged with  decreased AP movement and mild oral residue in the left lateral sulci and posterior tongue on left side. She was able to clear this with an additional swallow or a liquid rinse. Recommend: 1. DDL 3 with thin liquids. 2. Upright, no straws, small bites/sips, check left side for oral residue and alternate liquids/solids.    Total Evaluation Time   Total Evaluation Time (Minutes) 17

## 2020-02-27 NOTE — PROVIDER NOTIFICATION
"SLP group paged, \"Pt failed prescreen for L facial droop. Can someone see her soon for swallow eval please? Thank you!\"  "

## 2020-02-27 NOTE — PROGRESS NOTES
"PT:  Discharge Planner PT   Patient plan for discharge: Pt and family plan for pt to return home with spouse at discharge  Current status: Currently pt requires SBA for supine to sit, CGA sit to stand with HHA, ambulated 200 ft with min A and HHA with dec balance noted although pt states she feels well and \"like my normal self.\" Denies using a FWW on a regular basis. Performed Granger Balance Assessment with score of 32/56 putting pt at risk for continued falls. See below for detailed information. Pt demonstrates dec strength, balance, activity tolerance and difficulty ambulating and transferring and would benefit from skilled PT services in order to improve this.  Barriers to return to prior living situation: Needs assist with mobility, at risk to continue to fall.  Recommendations for discharge: ARU  Rationale for recommendations: Pt would benefit from continued PT while admitted and at discharge to improve strength, balance, mobility to increase independence, reduce falls risk and increase safety before returning home. Pt is motivated and has good family support of spouse and children with a good chance of her returning home from ARU.       Entered by: Betty Arreguin 02/27/2020 11:50 AM        02/27/20 1047   Signing Clinician's Name / Credentials   Signing clinician's name / credentials Betty Arreguin, DPT, G[facility]   Granger Balance Scale (KIMBERLY LUEVANO, ALIYA S, RAJNI VELOZ, GEOVANNA, FRANCESCO: MEASURING BALANCE IN THE ELDERLY: VALIDATION OF AN INSTRUMENT. CAN. J. PUB. HEALTH, JULY/AUGUST SUPPLEMENT 2:S7-11, 1992.)   Sit To Stand 3   Standing Unsupported 3   Sitting Unsupported 4   Stand to Sit 3   Transfers 4   Standing with Eyes Closed 2   Standing Unsupported, Feet Together 1   Reach Forward With Outstretched Arm 3   Retrieve Object From Floor 3   Turning to Look Behind 3   Turn 360 Degrees 2   Placing Alternate Foot on Stool (4-6 inches) 0   Unsupported Tandem Stand (Demonstrate to Subject) 0   One Leg Stand 1   Total " Score (A score of 45 or less has been correlated with an increased risk of falls)   Total Score (out of 56) 32     Granger Balance Scale (BBS) Cutoff Scores for CVA Population:  Patient Score: 32/56    The BBS is a measure of static and dynamic standing balance that has been validated in community dwelling elderly individuals and individuals who have Parkinson's Disease, MS, and those who are s/p CVA and TBI. The test is administered without an assistive device. Scores from the Granger are used to determine the probability of falling based on the patient's previous history of falls and their test performance.     0-20 High risk for falling- Corresponded with w/c bound status  21-40 Medium risk for falling- Able to walk with assistance  41-56 Low risk for falling- Able to walk independently  According to [facility].  Available at http://www.strokecenter.org/.  Accessibility verified April 10, 2013.  Minimal Detectable Change = 6.5 according to Evan & Nitish 2008    Assessment (rationale for performing, application to patient s function & care plan): Assess balance with history of frequent falls and new CVA  Minutes billed as physical performance test: 0, part of evaluation

## 2020-02-27 NOTE — PLAN OF CARE
Pt here with fall and suspected CVA, but neuro thinking it's related to C-spine. Waiting on pt to go down to MRI this evening. Alert to self and sometimes place or time, but confused. Neuros grossly intact, except slight L droop and neurology noted LLE drift. VSS. Tele SB. DD3 diet, thin liquids. Takes pills whole. Up with A1 w/ GB and walker. Voiding adequately, but wearing pad for occasional dribbling. Denies pain. Pt scoring yellow on the Aggression Stop Light Tool. Plan for MRI tonight. Discharge pending workup and therapy evals, nursing will continue to monitor.

## 2020-02-27 NOTE — PLAN OF CARE
Discharge Planner PT   Patient plan for discharge: Agreeable to ARU if needed  Current status: Greeted patient supine in bed with  at bedside and agreeable to therapy despite indicating already feeling fatigued. Engaged patient in supine <> EOB at SBA. Engaged patient in sit <> stand with FWW at CGA. Engaged patient in ambulation with FWW at CGA for a total distance of 400ft, with patient demonstrating mild path deviation & decreased stance time on left leg. No overt LOB noted with patient using FWW for ambulation - at baseline patient ambulating with no assistive device. Patient demonstrates fatigue & further therapy deferred to next session. Discussed discharge recommendation & role of ARU. Concluded session with patient supine in bed with all needs in reach.    Barriers to return to prior living situation: below baseline, impaired balance (HARRIS 32/56), fall risk, decreased activity tolerance  Recommendations for discharge: ARU  Rationale for recommendations: Patient has good family support, demonstrates motivation for participating in therapy and has potential to regain prior level of function to baseline independence. Patient appropriate for ARU discharge and would benefit from intense physical therapy in order to improve functional mobility to baseline, increase lower extremity strength and improve balance for decreasing fall risk. Anticipate that patient will be able to tolerate 3hrs of therapy per day.          Entered by: Adriana Enrique 02/27/2020 4:18 PM

## 2020-02-27 NOTE — ED TRIAGE NOTES
EMS arrival:    Patient resides at [facility] in the independent living area.  Lives with .     Tonight, woke up to go to bathroom.  Became lightheaded & dizzy.  Hit left head on wall.  No LOC.  Fell back, landed on left hip on carpeted floor.  Cut to left hand also.  Takes Elliquis.     Blood glucose 74.

## 2020-02-27 NOTE — CONSULTS
[facility]    Stroke Consult Note    Reason for Consult:  TIA    Chief Complaint: Fall       HPI  Polly De La Torre is a 88 year old female with history of atrial fibrillation, dyslipidemia, hypertension, valvular disease, and dementia who presented to the ED after a fall at home. She has been experiencing episodes of weakness and loss of balance for the past several months resulting in ~3 falls including one that resulted in another ED visit 11/29/19. These episodes are characterized by sudden onset left sided leg weakness, lightheadedness, and loss of balance that last about 15 seconds, after which she is able to resume normal function. She is usually able to catch herself on a wall or on her , but was unable to during this current fall. Episodes are not associated with standing up, certain times during day, before/after meals, and she drinks water frequently throughout the day. She has not noticed any other neurological deficits including changes in vision, sensation, facial droop, muscle weakness elsewhere, or difficulty walking outside of these sporadic episodes. CTH shows questionable infarct of the cerebellum that warrants further investigation with stroke consulted.     _____________________________________________________    Past Medical History   Past Medical History:   Diagnosis Date     Aortic regurgitation 2015     Aortic valve disorders      Choledocholithiasis     s/p ERCP x 3 2011, ERCP Jan 2014, Dr. Krishnan     Esophageal reflux      Gallstone pancreatitis      Osteoarthrosis, unspecified whether generalized or localized, unspecified site      Osteopenia      Past Surgical History   Past Surgical History:   Procedure Laterality Date     C NONSPECIFIC PROCEDURE  age 16    Appy     C NONSPECIFIC PROCEDURE      D&C x 2     CHOLECYSTECTOMY  1995    [facility]     Medications   Home Meds  Prior to Admission medications    Medication Sig Start Date End Date Taking? Authorizing  "Provider   amiodarone (PACERONE/CODARONE) 200 MG tablet TAKE 1 TABLET DAILY 8/26/19  Yes Tarsha Merritt MD   apixaban ANTICOAGULANT (ELIQUIS) 2.5 MG tablet Take 1 tablet (2.5 mg) by mouth 2 times daily 7/24/19  Yes Tasrha Merritt MD   lisinopril (PRINIVIL/ZESTRIL) 5 MG tablet Take 1 tablet (5 mg) by mouth daily 6/19/19  Yes Tarsha Merritt MD   predniSONE (DELTASONE) 20 MG tablet Take 20 mg by mouth 2 times daily Started 2/20/2020 at TID, decreased to BID on 2/24/2020, should be done on 3/2/2020   Yes Unknown, Entered By History   simvastatin (ZOCOR) 20 MG tablet Take 1 tablet (20 mg) by mouth At Bedtime 6/19/19  Yes Tarsha Merritt MD   order for DME Equipment being ordered: walking cane 1/2/20   Tarsha Merritt MD       Scheduled Meds    aspirin  325 mg Oral Daily    Or     aspirin  300 mg Rectal Daily     simvastatin  20 mg Oral At Bedtime     sodium chloride (PF)  3 mL Intracatheter Q8H       Infusion Meds    - MEDICATION INSTRUCTIONS -       - MEDICATION INSTRUCTIONS -         PRN Meds  acetaminophen, bisacodyl, hydrALAZINE, labetalol, lidocaine 4%, lidocaine (buffered or not buffered), magnesium sulfate, - MEDICATION INSTRUCTIONS -, melatonin, naloxone, ondansetron **OR** ondansetron, - MEDICATION INSTRUCTIONS -, polyethylene glycol, potassium chloride, potassium chloride with lidocaine, potassium chloride, potassium chloride, prochlorperazine **OR** prochlorperazine **OR** prochlorperazine, senna-docusate **OR** senna-docusate, sodium chloride (PF)    Allergies   Allergies   Allergen Reactions     Cetirizine      Made her so tired after taking doesn't want to take again,just can't live that way([employer])     Darvon [Propoxyphene]      Methadone      DARVON  ---  \"crawling\" sensation      Family History   Family History   Problem Relation Age of Onset     Hypertension Father      Cerebrovascular Disease Father      Allergies Son      Prostate Cancer No family hx of      Cancer - colorectal " No family hx of      Social History   Social History     Tobacco Use     Smoking status: Never Smoker     Smokeless tobacco: Never Used   Substance Use Topics     Alcohol use: Yes     Alcohol/week: 0.0 standard drinks     Comment: rarely     Drug use: No       Review of Systems   The 5 point Review of Systems is negative other than noted in the HPI or here.        PHYSICAL EXAMINATION   Temp:  [97.7  F (36.5  C)-98.4  F (36.9  C)] 97.8  F (36.6  C)  Pulse:  [56-68] 56  Heart Rate:  [56-65] 56  Resp:  [13-29] 18  BP: (111-178)/(51-96) 161/74  SpO2:  [94 %-99 %] 97 %      Mental status: Alert & oriented x4, unable to do serial 7s or repeat months backwards. Sentence repetition intact.   Cranial nerves: CNII-XII intact  Motor:     LUE 4+/5 (slight strength deficits, possibly due to pain from falling down). L  4/5 possibly d/t pain. RUE 5/5   LLE hip flexion 4/5, otherwise LLE is 5/5. RLE 5/5  Sensory: Intact in all extremities  Reflexes:    L Patellar 3+, R Patellar 2+   L/R Ankle 0   L Biceps/Shoulder 3+, R Biceps/Shoulder 1+  Coordination: Finger-to-nose normal, heel-shin normal  Gait: Did not assess      Dysphagia Screen  Dysphagia diet level 3; Thin liquids    Stroke Scales    NIHSS  Interval baseline (02/27/20 1558)   Interval Comments     1a. Level of Consciousness 0-->Alert, keenly responsive   1b. LOC Questions 0-->Answers both questions correctly   1c. LOC Commands 0-->Performs both tasks correctly   2.   Best Gaze 0-->Normal   3.   Visual 0-->No visual loss   4.   Facial Palsy 0-->Normal symmetrical movements   5a. Motor Arm, Left 0-->No drift, limb holds 90 (or 45) degrees for full 10 secs   5b. Motor Arm, Right 0-->No drift, limb holds 90 (or 45) degrees for full 10 secs   6a. Motor Leg, Left 1-->Drift, leg falls by the end of the 5-sec period but does not hit bed   6b. Motor Leg, right 0-->No drift, leg holds 30 degree position for full 5 secs   7.   Limb Ataxia 0-->Absent   8.   Sensory 0-->Normal, no  sensory loss   9.   Best Language 0-->No aphasia, normal   10. Dysarthria 0-->Normal   11. Extinction and Inattention  0-->No abnormality   Total 1 (02/27/20 1558)       Imaging    02/27/2020 Imaging  HEAD CTA:   1.  Right proximal A1 segment moderate stenosis.  2.  Remaining proximal intracranial arteries demonstrate no significant stenosis or occlusion.     NECK CTA:  1.  Left distal common carotid artery moderate stenosis.  2.  Remains cervical arteries demonstrate no significant stenosis or occlusion.  3.  Additional findings above..    CT Head w/o Contrast   IMPRESSION:  1.  Right superior cerebellar hemisphere small age-indeterminate infarct.  2.  Otherwise, no acute intracranial process identified.  3.  Chronic intracranial changes described above.  4.  Additional findings above.    Labs CBC  Recent Labs   Lab 02/27/20 0216   WBC 13.6*   RBC 4.86   HGB 15.5   HCT 48.9*        Basic Metabolic Panel   Recent Labs   Lab 02/27/20 0216      POTASSIUM 4.7   CHLORIDE 106   CO2 27   BUN 32*   CR 1.12*   *   RADHA 8.0*     Liver Panel  Recent Labs   Lab Test 02/27/20 0216 06/08/19  2107 05/23/18  0951   PROTTOTAL 6.1* 7.7 7.9   ALBUMIN 2.5* 3.4 3.8   BILITOTAL 0.4 0.3 0.5   ALKPHOS 115 63 56   AST 27 8 9   ALT 36 11 14     INRNo lab results found.   Lipid Profile  Recent Labs   Lab Test 02/27/20 0216 05/23/18  0951 03/16/16  0855 08/19/15  0741 08/04/14  0713 05/17/13  0832   CHOL 186 162 154 165 149 152   HDL 77 62 57 58 63 58   LDL 65 72 77 79 66 77   TRIG 222* 140 99 140 98 90   CHOLHDLRATIO  --   --   --  2.8 2.4 2.6     A1C  Recent Labs   Lab Test 02/27/20 0216 08/23/12  0900   A1C 6.0* 5.5     Troponin I  Recent Labs   Lab 02/27/20 0216   TROPI <0.015     =================================================================    ASSESSMENT/PLAN: Polly De La Torre is a 88 year old female with history of atrial fibrillation, dyslipidemia, hypertension, valvular disease, and dementia who  "presented to the ED after a fall at home. She has been experiencing increased episodes of sudden-onset L-sided muscle weakness and dizziness for the past several months. This is associated with L-sided hyperreflexia including the arm and leg suggesting upper motor neuron involvement. Spinal stenosis can present with intermittent muscle weakness with spontaneously resolution. Stroke is also possible but less likely given the short-duration and resolution of symptoms. CT shows evidence of possible R superior cerebellar infarct of indeterminate age, although the clinical significance of this is unclear. Other considerations include orthostatic hypotension.    ## Left-sided weakness  - MRI brain as scheduled  - Add on MR cervical spine w/ and w/o contrast  - Pending ECHO   - hold Eliquis pending MRI findings  - orthostatic vitals  - Continue Aspirin 325mg  - stroke team to continue to follow    =================================================================    Scribed by:  Rajeev Tiwari  MS3 Medical Student  02/27/2020 1628       Favian Mojica  Vascular Neurology Fellow    02/27/2020 3:59 PM  Text Page (8am-7pm)  To page stroke neurology after hours or on a subsequent day, click here: [facility]  Choose \"On Call\" tab at top, then search dropdown box for \"Neurology Adult\" & press Enter, look for Neuro ICU/Stroke      Stroke Code / Stroke Consult Data Data       "

## 2020-02-27 NOTE — ED NOTES
Bed: ED25  Expected date: 2/27/20  Expected time: 2:05 AM  Means of arrival: Ambulance  Comments:  Leny M2 89F fall

## 2020-02-27 NOTE — ED NOTES
"[facility]  ED Nurse Handoff Report    ED Chief complaint: Fall      ED Diagnosis:   Final diagnoses:   Syncope, unspecified syncope type   Fall, initial encounter   Cerebellar infarct (H)   Skin tear of left hand without complication, initial encounter       Code Status:  MD to address    Allergies:   Allergies   Allergen Reactions     Cetirizine      Made her so tired after taking doesn't want to take again,just can't live that way([employer]yr)     Darvon [Propoxyphene]      Methadone      DARVON  ---  \"crawling\" sensation        Patient Story:  Woke up to go to bathroom.  Became lightheaded & dizzy.  Fell, hitting head on wall.  No LOC.  Cut to left hand.   Focused Assessment:  Cut to left hand cleaned & steri-strips applied.  Patient states she feels fine now.  Does not walk with a cane or walker.  Takes Eliquis.  Alert & oriented.  Hx of dementia.  Seems to be pleasantly forgetful.  States \"right honey?\" frequently during patient assessment.  Off by a couple of days for the date.  Otherwise alert & oriented to person, place, situation.  Denies pain.       Treatments and/or interventions provided:  IV to left arm.  Wound care.   Patient's response to treatments and/or interventions:  Patient pleasant, calm.      To be done/followed up on inpatient unit:  [facility].     Does this patient have any cognitive concerns?: Baseline dementia    Activity level - Baseline/Home:  Independent  Activity Level - Current:   Stand with Assist    Patient's Preferred language: English   Needed?: No    Isolation: None  Infection: Not Applicable  Bariatric?: No    Vital Signs:   Vitals:    02/27/20 0545 02/27/20 0700 02/27/20 0805 02/27/20 0825   BP: (!) 158/57   (!) 178/67   Pulse:  68  65   Resp: 18      Temp:       TempSrc:       SpO2:  97% 96% 99%   Weight:       Height:           Cardiac Rhythm:     Was the PSS-3 completed:   Yes  What interventions are required if any?               Family " Comments:   with patient.  They reside at [facility] in an independent living apt.   for 66 years.   OBS brochure/video discussed/provided to patient/family: N/A              Name of person given brochure if not patient:                Relationship to patient:     For the majority of the shift this patient's behavior was [place].   Behavioral interventions performed were     ED NURSE PHONE NUMBER: [phone]

## 2020-02-27 NOTE — PLAN OF CARE
Discharge Planner SLP   Patient plan for discharge: Not addressed.   Current status: Bedside swallow evaluation completed. Patient presents with mild oral and pharyngeal dysphagia at bedside secondary to a right cerebellar stroke. Deficits include: mild left facial droop, mild oral motor deficits for ROM/coordination. She demonstrated premature entry of thin liquids with mild throat clear with consecutive swallows, none with single sips. Pudding was grossly within functional limits. Mastication was mildly prolonged with decreased AP movement and mild oral residue in the left lateral sulci and posterior tongue on left side. She was able to clear this with an additional swallow or a liquid rinse.   Recommend: 1. DDL 3 with thin liquids. 2. Upright, no straws, small bites/sips, check left side for oral residue and alternate liquids/solids. 3. SLP will follow up for diet tolerance and advancement.   Barriers to return to prior living situation: dysphagia and cognition.   Recommendations for discharge: Defer to PT/OT needs.   Rationale for recommendations: Anticipate swallow goals to be met prior to discharge as patient is near her baseline.        Entered by: Rachell Barboza 02/27/2020 11:52 AM

## 2020-02-27 NOTE — ED NOTES
Bed: ED26  Expected date: 2/27/20  Expected time:   Means of arrival:   Comments:  Team 2 possibly taking 29 @3

## 2020-02-27 NOTE — PROGRESS NOTES
Update note:     Patient seen and examined.  Admitted for further stroke work up.  Currently the patient feels near baseline but has yet to ambulate.  No complaints of pain or difficulty breathing.     Plan:   - Continue stroke evaluation.  MRI brain and echocardiogram ordered  - PT/OT/Speech evaluation   - Neurology consulted      [facility]

## 2020-02-27 NOTE — PLAN OF CARE
OT: Attempted to see pt for initial OT eval, however, pt having a procedure. Will continue to follow for OT eval.

## 2020-02-27 NOTE — PHARMACY-ADMISSION MEDICATION HISTORY
Pharmacy Medication History  Admission medication history interview status for the 2/27/2020  admission is complete. See EPIC admission navigator for prior to admission medications     Medication history sources: Patient's family/friend (spouse)  Medication history source reliability: Good  Adherence assessment: Good    Significant changes made to the medication list:  Deleted benzonatate, robitussin AC, duplicate prednisone      Additional medication history information:   Prednisone decreased to BID from TID on Monday per . Prescribed TID last week. Will complete therapy on Monday next week    Medication reconciliation completed by provider prior to medication history? No    Time spent in this activity: 10 minutes      Prior to Admission medications    Medication Sig Last Dose Taking? Auth Provider   amiodarone (PACERONE/CODARONE) 200 MG tablet TAKE 1 TABLET DAILY 2/26/2020 at am Yes Tarsha Merritt MD   apixaban ANTICOAGULANT (ELIQUIS) 2.5 MG tablet Take 1 tablet (2.5 mg) by mouth 2 times daily 2/26/2020 at pm Yes Tarsha Merritt MD   lisinopril (PRINIVIL/ZESTRIL) 5 MG tablet Take 1 tablet (5 mg) by mouth daily 2/26/2020 at am Yes Tarsha Merritt MD   predniSONE 20 MG PO tablet Take 1 tablet (20 mg) by mouth 2 times daily 2/26/2020 at Unknown time Yes Tarsha Merritt MD   simvastatin (ZOCOR) 20 MG tablet Take 1 tablet (20 mg) by mouth At Bedtime 2/26/2020 at pm Yes Tarsha Merritt MD   order for DME Equipment being ordered: walking cane   Tarsha Merritt MD

## 2020-02-27 NOTE — ED NOTES
Pt and family updated on bed status.  Pt doesn't want to go the bathroom until she is home, reorieinted that she is being admitted to hospital.

## 2020-02-27 NOTE — H&P
[facility]    History and Physical - Hospitalist Service       Date of Admission:  2/27/2020    Assessment & Plan   Polly De La Torre is a 88 year old female admitted on 2/27/2020. 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  No other focal neurologic deficits appreciated.  She reports that she falls to the left every time she falls, though has been falling frequently for the past several months, at least since November evaluation for falls.  -Neurology consulted  -Normal saline at 100/h  -Physical therapy, Occupational Therapy consulted  -Bedside speech evaluation, speech pathology consultation if needed.  N.p.o. until cleared for oral intake  -Daily p.o./OK aspirin  -CTA pending in the emergency department.  -MRI brain  -Prior to admission Eliquis currently on hold pending MRI brain to rule out amyloid angiopathy in the setting of dementia, more closely approximate age of infarct  -TTE with bubble study pending  -Continue prior to admission simvastatin 20 mg at bedtime  -Prior to admission lisinopril 5 mg daily currently on hold    Presyncope with fall: Patient reports feeling lightheaded prior to fall.  Cardiac auscultation with 3/6 holosystolic murmur best appreciated right upper sternal border.  Echocardiogram July 2019 demonstrated proximal septal thickening with normal LVEF.  -TTE pending  -Continue cardiac telemetry    Atrial fibrillation:  -Anticipate resumption of prior to admission amiodarone following pharmacy reconciliation  -Prior to admission apixaban 2.5 mg twice daily currently held pending MRI findings.  Anticipate resumption following MRI       Diet: N.p.o. pending bedside swallow  evaluation  DVT Prophylaxis: Pneumatic Compression Devices.  Pending MR brain findings, anticipate resumption of Eliquis 2.5 mg twice daily  Wagner Catheter: not present  Code Status: Full code.  Confirmed with patient on admission    Disposition Plan   Expected discharge: Tomorrow, recommended to prior living arrangement once safe disposition plan/ TCU bed available.  Entered: Raoul Fung MD 02/27/2020, 5:47 AM     The patient's care was discussed with the Patient and Patient's  at bedside, Dr. Rangel in the emergency department.    Raoul Fung MD  [facility]    ______________________________________________________________________    Chief Complaint   Fall    History is obtained from patient, chart review, discussion with patient's  at bedside, discussion with Dr. Rangel in the emergency department.    History of Present Illness   Polly De La Torre is a 88 year old female who presents to the emergency department after a fall in her assisted living apartment at [place].    Patient has a history of frequent falls, atrial fibrillation, septal thickening on echocardiogram, aortic insufficiency.  For the past 6 or so months, patient has been having increased falls.  Secondary to her increased falls, patient and her  moved to [facility] in January 2020.    This past summer, patient was diagnosed with a urinary tract infection, subsequently found to have a new diagnosis of atrial fibrillation.  Initiated on Eliquis at that time.  In November, patient was evaluated in the emergency department after a fall.  A head CT was performed at that time given anticoagulation with no acute findings.  Patient has had several falls since then, and again presents with complaints of a fall.  Patient tells me that whenever she falls, she falls to her left side.  Note that history is somewhat questionable given patient appears to have some degree of cognitive  "impairment/dementia.  She repeats questions and statements several times during her interview, provides somewhat generalized statements such as \"I am doing pretty well,\" and some history including patient's mother and father family history is actually provided by  at bedside as patient cannot recall.    Patient reports that all of her multiple falls at home have occurred when she is not using her wheeled walker.  She typically attempts to use her walker per , though tonight's fall was in the bathroom where she did not have her walker, and occasionally she will not use her walker.    Patient describes fall as standing and turning in the bathroom, feeling lightheaded, falling to her left side.  She hit her left side of her head on the wall, caught her left hand on the ground.  Also landed on her left hip, though has no hip or pelvis complaints.    Patient had a skin tear on her left palm which was dressed in the emergency department.  A noncontrast head CT was obtained given anticoagulation with fall and blunt head injury.  This demonstrated a small superior right cerebellar infarct, new since November CT imaging.  No other focal neurologic deficits.    As patient on Eliquis and suspected subacute infarct, neurology was not contacted, though a CTA was performed in the emergency department to ensure no acute embolic event in the setting of atrial fibrillation.  Patient endorses taking her Eliquis twice daily since prescription this past summer.    Review of Systems    The 10 point Review of Systems is negative other than noted in the HPI or here.   No fevers or chills  No headache  Left hand pain with skin tear    Past Medical History    I have reviewed this patient's medical history and updated it with pertinent information if needed.   Past Medical History:   Diagnosis Date     Aortic regurgitation 2015     Aortic valve disorders      Choledocholithiasis     s/p ERCP x 3 2011, ERCP Jan 2014, Dr. Krishnan "     Esophageal reflux      Gallstone pancreatitis      Osteoarthrosis, unspecified whether generalized or localized, unspecified site      Osteopenia        Past Surgical History   I have reviewed this patient's surgical history and updated it with pertinent information if needed.  Past Surgical History:   Procedure Laterality Date     C NONSPECIFIC PROCEDURE  age 16    Appy     C NONSPECIFIC PROCEDURE      D&C x 2     [facility]  [facility]       Social History   I have reviewed this patient's social history and updated it with pertinent information if needed.  Social History     Tobacco Use     Smoking status: Never Smoker     Smokeless tobacco: Never Used   Substance Use Topics     Alcohol use: Yes     Alcohol/week: 0.0 standard drinks     Comment: rarely     Drug use: No       Family History   I have reviewed this patient's family history and updated it with pertinent information if needed.   Family History   Problem Relation Age of Onset     Hypertension Father      Cerebrovascular Disease Father      Allergies Son      Prostate Cancer No family hx of      Cancer - colorectal No family hx of        Prior to Admission Medications   Prior to Admission Medications   Prescriptions Last Dose Informant Patient Reported? Taking?   amiodarone (PACERONE/CODARONE) 200 MG tablet   No No   Sig: TAKE 1 TABLET DAILY   apixaban ANTICOAGULANT (ELIQUIS) 2.5 MG tablet   No No   Sig: Take 1 tablet (2.5 mg) by mouth 2 times daily   benzonatate (TESSALON) 100 MG capsule   No No   Sig: Take 1 capsule (100 mg) by mouth 3 times daily as needed for cough   guaiFENesin-codeine (GUAIFENESIN AC) 100-10 MG/5ML syrup   No No   Sig: Take 5 mLs by mouth every 4 hours as needed for congestion   Patient not taking: Reported on 2/24/2020   lisinopril (PRINIVIL/ZESTRIL) 5 MG tablet   No No   Sig: Take 1 tablet (5 mg) by mouth daily   order for DME   No No   Sig: Equipment being ordered: walking cane   predniSONE (DELTASONE) 20 MG  "tablet   No No   Sig: Take 1 tablet (20 mg) by mouth 2 times daily   predniSONE 20 MG PO tablet   No No   Sig: Take 1 tablet (20 mg) by mouth 3 times daily for 7 days   simvastatin (ZOCOR) 20 MG tablet   No No   Sig: Take 1 tablet (20 mg) by mouth At Bedtime      Facility-Administered Medications: None     Allergies   Allergies   Allergen Reactions     Cetirizine      Made her so tired after taking doesn't want to take again,just can't live that way([employer])     Darvon [Propoxyphene]      Methadone      DARVON  ---  \"crawling\" sensation        Physical Exam   Vital Signs: Temp: 97.7  F (36.5  C) Temp src: Oral BP: (!) 142/51   Heart Rate: 59 Resp: 15 SpO2: 94 % O2 Device: None (Room air)    Weight: 95 lbs 0 oz    General Appearance: Thin, though spry 88-year-old female resting comfortably in bed.  Eyes: No scleral icterus or injection.  HEENT: Normocephalic  Respiratory: Breath sounds with reduced air movement throughout lung fields, no crackles, no wheezing.  Good effort.  Cardiovascular: Regular rate and rhythm with heart rate currently in the 60s.  3/6 systolic murmur across precordium, though most notable in the vicinity of right upper sternal border  GI: Abdomen soft, nontender palpation.  No palpable mass  Lymph/Hematologic: No lower extremity edema  Genitourinary: Not examined  Skin: Skin tear left palm is currently dressed.  Musculoskeletal: Thin, muscular tone intact in all extremities without contractures.  Neurologic: Patient repeats several statements and occasionally answers with vague generalities concerning for cognitive impairment/dementia.  Speech otherwise intact.  Patient with slight slowing of repeated rapid motions of distal left upper extremity as compared to right.  Has some pain which limits left hand  as compared to right in the setting of recent skin tear.  Left hip flexors are slightly weaker against resistance as compared to right.  No foot drop.  No other focal neurologic deficits " and no overt cranial nerve deficits.  Psychiatric: Normal affect, very pleasant    Data   Data reviewed today: I reviewed all medications, new labs and imaging results over the last 24 hours. I personally reviewed no images or EKG's today.    Recent Labs   Lab 02/27/20  0216   WBC 13.6*   HGB 15.5   *         POTASSIUM 4.7   CHLORIDE 106   CO2 27   BUN 32*   CR 1.12*   ANIONGAP 6   RADHA 8.0*   *

## 2020-02-27 NOTE — PROGRESS NOTES
02/27/20 1017   Quick Adds   Type of Visit Initial PT Evaluation   Living Environment   Lives With spouse   Living Arrangements apartment;independent living facility  ([facility])   Home Accessibility no concerns   Self-Care   Usual Activity Tolerance good   Current Activity Tolerance fair   Regular Exercise No   Equipment Currently Used at Home none   Functional Level Prior   Ambulation 0-->independent   Transferring 0-->independent   Fall history within last six months yes   Number of times patient has fallen within last six months 6   Which of the above functional risks had a recent onset or change? ambulation;transferring   General Information   Onset of Illness/Injury or Date of Surgery - Date 02/27/20   Referring Physician Raoul Fung MD   Patient/Family Goals Statement Return home with spouse per pt and daughter who was present   Pertinent History of Current Problem (include personal factors and/or comorbidities that impact the POC) Pt admitted after a fall and found to have a R superior cerebellar CVA. PMH: frequent falls, afib.   Precautions/Limitations fall precautions   Weight-Bearing Status - LLE full weight-bearing   Weight-Bearing Status - RLE full weight-bearing   General Observations Daughter and granddaughter present   Cognitive Status Examination   Orientation person;place   Level of Consciousness alert;confused   Follows Commands and Answers Questions 100% of the time;able to follow single-step instructions   Personal Safety and Judgment impaired   Memory impaired   Pain Assessment   Patient Currently in Pain No   Posture    Posture Forward head position;Protracted shoulders   Range of Motion (ROM)   ROM Quick Adds No deficits were identified   Strength   Strength Comments B hip flex 3+/5, knee ext 4+/5, DF 5/5   Bed Mobility   Bed Mobility Comments Supine to sit with SBA   Transfer Skills   Transfer Comments Sit to stand with CGA and HHA   Gait   Gait Comments Pt ambulated 10 ft wtih CGA and  "HHA and demonstrates dec balance, small step length and narrow CLARIBEL.   Balance   Balance Comments Balance dec with standing and gait, does not feel comfortable standing wtihout holding onto something   Sensory Examination   Sensory Perception Comments Denies numbness or tingling   General Therapy Interventions   Planned Therapy Interventions bed mobility training;gait training;neuromuscular re-education;transfer training;strengthening   Clinical Impression   Criteria for Skilled Therapeutic Intervention yes, treatment indicated   PT Diagnosis Difficulty ambulating   Influenced by the following impairments Dec strength, balance, activity tolerance   Functional limitations due to impairments Difficulty ambulating and transferring   Clinical Presentation Stable/Uncomplicated   Clinical Presentation Rationale medically stable   Clinical Decision Making (Complexity) Low complexity   Therapy Frequency 2x/day   Predicted Duration of Therapy Intervention (days/wks) 3 days   Anticipated Discharge Disposition Acute Rehabilitation Facility   Risk & Benefits of therapy have been explained Yes   Patient, Family & other staff in agreement with plan of care Yes   [facility] TM \"6 Clicks\"   2016, Trustees of [facility], under license to [employer].  All rights reserved.   6 Clicks Short Forms Basic Mobility Inpatient Short Form   [facility]  \"6 Clicks\" V.2 Basic Mobility Inpatient Short Form   1. Turning from your back to your side while in a flat bed without using bedrails? 3 - A Little   2. Moving from lying on your back to sitting on the side of a flat bed without using bedrails? 3 - A Little   3. Moving to and from a bed to a chair (including a wheelchair)? 3 - A Little   4. Standing up from a chair using your arms (e.g., wheelchair, or bedside chair)? 3 - A Little   5. To walk in hospital room? 3 - A Little   6. Climbing 3-5 steps with a railing? 2 - A Lot   Basic Mobility Raw Score (Score " out of 24.Lower scores equate to lower levels of function) 17   Total Evaluation Time   Total Evaluation Time (Minutes) 15

## 2020-02-27 NOTE — ED PROVIDER NOTES
History     Chief Complaint:  Fall    HPI   Polly De La Torre is a 88 year old female, on Eliquis for a history of atrial fibrillation, with a history of dementia, hypertension, hyperlipidemia, osteopenia, among other who presents with  from [facility] via EMS for evaluation after a fall prior to arrival. The patient states she got up to go to the bathroom around 0030 this morning and fell secondary to feeling lightheaded and dizzy. She states she hit the left side of her head on the wall as she fell backwards and sustained a laceration to her left palm while landing on her left hip. EMS was called for evaluation by her .    En route, her blood sugar was 74.    Here, the patient denies any loss of consciousness. She denies any hip pain, neck pain, neck stiffness, or other pain following the fall.      Allergies:  Cetirizine   Darvon  Methadone      Medications:    Pacerone  Eliquis  Tessalon  Prinivil  Deltasone   Zocor      Past Medical History:    Aortic regurgitation  Aortic valve disorder  Choledocholithiasis  GERD  Gallstone pancreatitis  Osteoarthrosis  Osteopenia   Hyperlipidemia  Dementia  Atrial fibrillation with RVR   Hypertension    Past Surgical History:    Appendectomy  D&C x 2  Cholecystectomy   ERCP x 4    Family History:    Father - Hypertension, Cerebrovascular disease   Brother - Cancer, Heart disease, Genitourinary disease  Son - Allergies     Social History:  The patient was accompanied to the ED by .  Smoking Status: Never  Smokeless Tobacco: Never  Alcohol Use: Yes  Drug Use: No   Marital Status:   [2]     Review of Systems   Unable to perform ROS: Dementia   Musculoskeletal: Negative for arthralgias, myalgias, neck pain and neck stiffness.   Skin: Positive for wound.   Neurological: Positive for dizziness and light-headedness.   All other systems reviewed and are negative.      Physical Exam     Patient Vitals for the past 24 hrs:    BP Temp Temp src Heart Rate Resp SpO2 Height Weight   02/27/20 0400 (!) 142/51 -- -- 59 15 94 % -- --   02/27/20 0345 (!) 131/96 -- -- 59 22 94 % -- --   02/27/20 0245 111/84 -- -- 62 13 96 % -- --   02/27/20 0230 (!) 160/57 -- -- 63 18 96 % -- --   02/27/20 0215 (!) 151/51 -- -- -- -- -- -- --   02/27/20 0209 (!) 165/57 97.7  F (36.5  C) Oral 62 16 97 % 1.524 m (5') 43.1 kg (95 lb)     Physical Exam  Physical Exam   Nursing note and vitals reviewed.  General: Oriented to person, place, and time.  Head: No signs of trauma.   Mouth/Throat: Oropharynx is clear and moist.   Eyes: Conjunctivae are normal. Pupils are equal, round, and reactive to light.   Neck: Normal range of motion. No nuchal rigidity.   Cardiovascular: Normal rate and regular rhythm.    Respiratory: Effort normal and breath sounds normal. No respiratory distress.   Abdominal: Soft. There is no tenderness. There is no guarding.   Musculoskeletal: Normal range of motion. no edema.   Neurological: The patient is alert and oriented to person.  PERRLA, EOMI, visual fields intact, strength in upper/lower extremities normal and symmetrical. Sensation normal. Speech normal  GCS eye subscore is 4. GCS verbal subscore is 5. GCS motor subscore is 6.   Skin: Skin is warm and dry. No rash noted. Small c-shaped skin tear on left palm.  Psychiatric: normal mood and affect. behavior is normal.    Emergency Department Course     ECG:  ECG taken at 0206, ECG read at 0218 by Garrett Rangel MD  Sinus bradycardia  Minimal voltage criteria for LVH, may be normal variant  Borderline ECG  No significant change compared to EKG dated 11/29/19  Rate 56 bpm. WA interval 172. QRS duration 82. QT/QTc 454/449. P-R-T axes 44 -14 41.      Imaging:  Radiology findings were communicated with the patient and family who voiced understanding of the findings.    Head CT w/o contrast  IMPRESSION:  1.  Right superior cerebellar small age-indeterminate infarct new compared to CT head  11/29/2019.  2.  Otherwise, no acute intracranial process identified.  3.  Chronic intracranial changes described above.  4.  Additional findings above.  Reading per radiology.      CTA Head Neck with Contrast  Pending.     Laboratory:  Laboratory findings were communicated with the patient who voiced understanding of the findings.    CBC: WBC 13.6 (H) o/w WNL (HGB 15.5, )   BMP: Glucose 156 (H), Bun 32 (H), Creatinine 1.12 (H), GFR 44 (L), Calcium 8.0 (L) o/w WNL    Emergency Department Course:  Past medical records, nursing notes, and vitals reviewed.  EKG obtained in the ED, see results above.    The patient was sent for a Head CT w/o contrast, CTA Head Neck with Contrast while in the emergency department, results above.  IV was inserted and blood was drawn for laboratory testing, results above.        (0227)   I performed an exam of the patient as documented above. History obtained from patient and .     (6953)   I spoke with Dr. German of the Radiology service from [facility] regarding patient's presentation, findings, and plan of care.      (9091)   Neurology paged.     (1901)   I spoke with Dr. Fung of the Hospitalist service regarding patient's presentation, findings, and plan of care.     (6694)   I rechecked the patient and discussed results and plan of care.     Findings and plan explained to the Patient and spouse who consents to admission. Discussed the patient with Dr. Fung, who will admit the patient to a neuro bed for further monitoring, evaluation, and treatment.  I personally reviewed the laboratory and imaging results with the Patient and spouse and answered all related questions prior to admission.     Impression & Plan   Medical Decision Making:  Polly De La Torre is a 88 year old female who presents for evaluation after a fall.  This is consistent with likely cerebrovascular accident.  Imaging as noted above.  Based on clinical evaluation, they are not a candidate for  TPA as it is a subacute cerebellar hemorrhage. Did discuss this with the patient and they express understanding. Differential considered for symptoms included CVA, TIA, dissection, cerebral tumor, migraine, infection, metabolic derangement, demyelination, etc.  EKG shows no atrial fibrillation nor arrhythmia noted on telemetry monitoring.  Will admit to medicine.  Will need further workup as inpatient for reason for neurologic deficits.       Diagnosis:    ICD-10-CM    1. Syncope, unspecified syncope type R55 CBC with platelets + differential   2. Fall, initial encounter W19.XXXA    3. Cerebellar infarct (H) I63.9    4. Skin tear of left hand without complication, initial encounter S61.412A      Disposition:  Admitted to a neuro bed under the care of Dr. Fung.     Scribe Disclosure:  INixon, am serving as a scribe at 2:11 AM on 2/27/2020 to document services personally performed by Garrett Rangel MD based on my observations and the provider's statements to me.    2/27/2020    EMERGENCY DEPARTMENT       Garrett Rangel MD  02/27/20 0629

## 2020-02-28 NOTE — PROGRESS NOTES
Stroke Progress Note  02/28/2020    ON events: MRI done overnight.     Problem List:  Patient Active Problem List   Diagnosis     Esophageal reflux     Osteoarthritis     HYPERLIPIDEMIA LDL GOAL <130     Advanced directives, counseling/discussion     Choledocholithiasis     Vitamin B12 deficiency (non anemic)     Fatigue     Dementia without behavioral disturbance (H)     Aortic valve insufficiency, etiology of cardiac valve disease unspecified     Osteopenia of multiple sites     Atrial fibrillation with RVR (H)     CVA (cerebral vascular accident) (H)       Current Medications:    apixaban ANTICOAGULANT  2.5 mg Oral BID     simvastatin  20 mg Oral At Bedtime     sodium chloride (PF)  3 mL Intracatheter Q8H       PRN Medications:  acetaminophen, bisacodyl, hydrALAZINE, labetalol, lidocaine 4%, lidocaine (buffered or not buffered), magnesium sulfate, - MEDICATION INSTRUCTIONS -, melatonin, naloxone, ondansetron **OR** ondansetron, - MEDICATION INSTRUCTIONS -, polyethylene glycol, potassium chloride, potassium chloride with lidocaine, potassium chloride, potassium chloride, prochlorperazine **OR** prochlorperazine **OR** prochlorperazine, senna-docusate **OR** senna-docusate, sodium chloride (PF)    Infusions:    - MEDICATION INSTRUCTIONS -       - MEDICATION INSTRUCTIONS -         Objective findings:  /67 (BP Location: Left arm)   Pulse 56   Temp 97.4  F (36.3  C) (Oral)   Resp 18   Ht 1.524 m (5')   Wt 43.1 kg (95 lb)   SpO2 96%   BMI 18.55 kg/m      Intake/Output:    Intake/Output Summary (Last 24 hours) at 2/28/2020 0917  Last data filed at 2/28/2020 0000  Gross per 24 hour   Intake 340 ml   Output 200 ml   Net 140 ml       Physical Examination:   Vitals:  B/P: 133/67, T: 97.4, P: 56, R: 18  General:  Laying in bed  HEENT:  NC/AT, no icterus, op pink and moist, no ear or nose drainage.   Cardiac:  RRR, no m/r/g  Chest:  CTAB  Abdomen:  S/NT/ND  Extremities:  No LE swelling.    Skin:  No rash or  "lesion.      Neuro Exam:  Mental status: AOx4, speech fluent  Cranial nerves: EOMI, face symmetric and equal to LT. VFF  Motor: 4/5 L hip flexor. O/w 5/5  Reflexes: hyperreflexic on L hemibody with shoulder spread.   Sensory: equal to LT  Coordination: FTN intact BL    Labs/Studies:  All pertinent labs and studies reviewed.    ==========================================================    ASSESSMENT/PLAN: Polly De La Torre is a 88 year old female with history of atrial fibrillation, dyslipidemia, hypertension, valvular disease, and dementia who presented to the ED after a fall at home. She has been experiencing increased episodes of sudden-onset L-sided muscle weakness and dizziness/imbalance for the past several months. MRI brain shows no new stroke but multiple old infarcts and severe chronic small vessel ischemic disease. There is one questionable small area of increased diffusion without ADC changes that likely represents older stroke in the L frontal lobe that is probably cardioembolic from afib, is very small, and is very unlikely to be contributing to clinical picture. MRI cervical spine unremarkable. Unclear etiology of ongoing falls at this point. We have low suspicion of stroke or cervical myelopathy as the case of her falls. If further workup considered, recommend consulting general neurology. Also recommend obtaining UA as UTI can cause intermittent neurologic symptoms in elderly patients. Also look into orthostatic hypotension. No further stroke workup recommended. Will sign off. Please call if further questions.     =========================================================    This patient was discussed with my attending, Dr. Hernandez, who agrees with my assessment and plan.     Favian Mojica  Vascular Neurology Fellow    02/28/2020 9:17 AM  Text Page (8am-7pm)  To page stroke neurology after hours or on a subsequent day, click here: [facility]  Choose \"On Call\" tab at top, then search dropdown box for " "\"Neurology Adult\" & press Enter, look for Neuro ICU/Stroke            "

## 2020-02-28 NOTE — PROGRESS NOTES
02/28/20 0819   Quick Adds   Type of Visit Initial Occupational Therapy Evaluation   Living Environment   Lives With spouse   Living Arrangements apartment;independent living facility   Home Accessibility no concerns   Transportation Anticipated family or friend will provide   Living Environment Comment step in shower   Self-Care   Usual Activity Tolerance moderate   Current Activity Tolerance fair   Regular Exercise No   Equipment Currently Used at Home grab bar, tub/shower;grab bar, toilet;walker, rolling   Activity/Exercise/Self-Care Comment reports difficult to get in and out of a vehicle   Functional Level   Ambulation 1-->assistive equipment   Transferring 1-->assistive equipment   Toileting 0-->independent   Bathing 1-->assistive equipment   Dressing 0-->independent   Eating 0-->independent   Fall history within last six months yes   Number of times patient has fallen within last six months 6   Which of the above functional risks had a recent onset or change? ambulation;transferring;toileting;bathing;dressing;eating   Prior Functional Level Comment Pt is unreliable historian, info confirmed by spouse   General Information   Onset of Illness/Injury or Date of Surgery - Date 02/27/20   Referring Physician Fung, Raoul Champion MD   Patient/Family Goals Statement reduce falls   Additional Occupational Profile Info/Pertinent History of Current Problem Polly De La Torre is a 88 year old female with history of atrial fibrillation, dyslipidemia, hypertension, valvular disease, and dementia who presented to the ED after a fall at home. She has been experiencing increased episodes of sudden-onset L-sided muscle weakness and dizziness/imbalance for the past several months. MRI brain shows no new stroke but multiple old infarcts and severe chronic small vessel ischemic disease. There is one questionable small area of increased diffusion without ADC changes that likely represents older stroke in the L frontal lobe  that is probably cardioembolic from afib, is very small, and is very unlikely to be contributing to clinical picture. MRI cervical spine unremarkable. Unclear etiology of ongoing falls at this point. We have low suspicion of stroke or cervical myelopathy as the case of her falls. If further workup considered, recommend consulting general neurology   Precautions/Limitations fall precautions   Cognitive Status Examination   Orientation   (disoriented to time, place, year)   Level of Consciousness alert   Follows Commands (Cognition) follows one step commands;initiation impaired;repetition of directions required   Memory impaired   Attention No deficits were identified   Organization/Problem Solving Problem solving impaired   Executive Function Cognitive flexibility impaired;Working memory impaired, decreased storage of information for performing tasks   Cognitive Comment baseline dementia   Visual Perception   Visual Perception No deficits were identified   Pain Assessment   Patient Currently in Pain No  (Has bruising on L wrist from fall but denies pain)   Strength   Strength Comments Grossly 4+/5 in BUEs, symmetric   Transfer Skill: Bed to Chair/Chair to Bed   Level of [place]: Bed to Chair minimum assist (75% patients effort)   Physical Assist/Nonphysical Assist: Bed to Chair 1 person assist   Toilet Transfer   Toilet Transfer Toilet Transfer Skill   Transfer Skill: Toilet Transfer   Level of [place]: Toilet minimum assist (75% patients effort)   Physical Assist/Nonphysical Assist: Toilet 1 person assist   Lower Body Dressing   Level of [place]: Dress Lower Body moderate assist (50% patients effort)   Toileting   Level of [place]: Toilet moderate assist (50% patients effort)   Instrumental Activities of Daily Living (IADL)   Previous Responsibilities laundry;shopping;driving   IADL Comments hired services for laundry and dusting. receives med setup. spouse goes shopping with her, spouse does  "financial management   Activities of Daily Living Analysis   Impairments Contributing to Impaired Activities of Daily Living balance impaired;cognition impaired;strength decreased   General Therapy Interventions   Planned Therapy Interventions ADL retraining;balance training;home program guidelines;progressive activity/exercise   Clinical Impression   Criteria for Skilled Therapeutic Interventions Met yes, treatment indicated   OT Diagnosis decline function   Influenced by the following impairments balance impaired;cognition impaired;strength decreased   Assessment of Occupational Performance 5 or more Performance Deficits   Identified Performance Deficits dressing, grooming, toileting, bathing, functional transfers, shopping, community mobility   Clinical Decision Making (Complexity) Moderate complexity   Therapy Frequency Daily   Predicted Duration of Therapy Intervention (days/wks) 3 days   Anticipated Equipment Needs at Discharge shower chair   Anticipated Discharge Disposition Transitional Care Facility  (pt declining, wanting home with home therapy)   Risks and Benefits of Treatment have been explained. Yes   Patient, Family & other staff in agreement with plan of care Yes   Clinical Impression Comments Pt functioning below baseline and will benefit from continued skilled OT to maximize safety and independence   [facility] TM \"6 Clicks\"   2016, Trustees of [facility], under license to [employer].  All rights reserved.   6 Clicks Short Forms Daily Activity Inpatient Short Form   [facility] AM-PAC  \"6 Clicks\" Daily Activity Inpatient Short Form   1. Putting on and taking off regular lower body clothing? 3 - A Little   2. Bathing (including washing, rinsing, drying)? 3 - A Little   3. Toileting, which includes using toilet, bedpan or urinal? 3 - A Little   4. Putting on and taking off regular upper body clothing? 3 - A Little   5. Taking care of personal grooming such as brushing " teeth? 3 - A Little   6. Eating meals? 4 - None   Daily Activity Raw Score (Score out of 24.Lower scores equate to lower levels of function) 19   Total Evaluation Time   Total Evaluation Time (Minutes) 13

## 2020-02-28 NOTE — PLAN OF CARE
Speech Language Therapy Discharge Summary    Reason for therapy discharge:    All goals and outcomes met, no further needs identified.    Progress towards therapy goal(s). See goals on Care Plan in [facility] electronic health record for goal details.  Goals met    Therapy recommendation(s):    No further therapy is recommended.  SLP: Meal follow up at lunch and pt agreed to trial regular texture toast. Slow, but adequate mastication on DD3 and regular textures. Minimal oral residue cleared with liquid wash. No s/sx of aspiration throughout the meal. Pt and family verbalized agreement with upgrade to regular diet/thin liquids with standard aspiration precautions and no further SLP services at this time.

## 2020-02-28 NOTE — PLAN OF CARE
Discharge Planner PT   Patient plan for discharge: Home  Current status: Greeted patient supine in bed with  at bedside and agreeable to therapy. Engaged patient in supine <> EOB at [facility]. Engaged patient in multiple reps of sit <> stand with FWW with education on safe hand placement on seated surface, patient with difficulty learning new movement pattern and continues to revert to hands on walker when standing up/sitting down despite cues/education. Engaged patient in ambulation with FWW at [facility] for a distance of 300ft, with no overt LOB during ambulation. Engaged patient in standing balance exercises, with patient demonstrating increased difficulty & unsteadiness with conditions that include no UE support. Concluded session with patient supine in bed, all needs in reach, alarm on.     PM session: Greeted patient supine in bed with daughter at bedside. Engaged patient in supine <> EOB at [facility]. Engaged patient in multiple sit <> stands from various surface heights. Patient is SBA from height surface of bed; Wander for standard chair height and modA from low toilet seat. Engaged patient in bed > bathroom with FWW, patient requires safety cues while using the rest room and task sequencing cues for pulling up brief after using toilet. Engaged patient in ambulation with 4WW at [facility] for a total distance of 300ft with cues for safe use of brakes. Patients return to home is most limited in decreased balance and safety awareness. Concluded session with patient supine in bed, with all needs in reach.   Barriers to return to prior living situation: baseline dementia, unsteadiness with dynamic standing balance, fall risk (HARRIS 32/56)  Recommendations for discharge: TCU  Rationale for recommendations: Patient is below baseline for functional mobility & would benefit from continued physical therapy in the setting of a TCU for improving functional mobility, LE strength, balance and tolerance for functional activities in order  to return to baseline of functioning.     If patient declining TCU, 24/7 supervision would be recommended for functional mobility with patient using walker for all ambulation. And HH-PT.          Entered by: Adriana Enrique 02/28/2020 9:26 AM

## 2020-02-28 NOTE — PROVIDER NOTIFICATION
Notified on call MD: Johnathan Ocampo MD    MRI results negative, would you like to re-start PTA Eliquis?

## 2020-02-28 NOTE — CONSULTS
Care Transition Initial Assessment - SW     Met with: Patient and daughter Gill   Active Problems:    CVA (cerebral vascular accident) (H)       DATA  Lives With: spouse at [place]   Living Arrangements: apartment, independent living facility     Description of Support System: Supportive, Involved  Who is your support system?: , Children     Identified issues/concerns regarding health management:  Pt lives with her  at an [place] apartment. Pt's , Neal, is frail with Parkinsons but very mentally sharp      SW spoke with pt and daughter, Gill regarding recommendations for TCU. Pt agreeable. Daughter, Gill, agreeable with recommendation also but states that TCU would only work if  Pt goes to [place] near  as he will need to see her and is frail. Daughter worried that pt's  would try to drive to go see pt if at another facility. Daughter Gill also warns that while pt agrees to TCU now she may balk when it comes time to move to TCU instead of home.  Referral sent to [place] via [facility].     Transportation Anticipated: family or friend will provide    ASSESSMENT  Cognitive Status:  awake, alert and oriented  Concerns to be addressed: TCU placement at [place] .     PLAN  Financial costs for the patient includes  TBD. Pt may  end up paying privately  Depending on when pt discharges..  Patient/family is agreeable to the plan?  yes  Transportation/person available to transport on day of discharge .  Patient Goals and Preferences: home vs [place] TCU.    PRUDENCIO Walker  [facility]  [phone]          ,

## 2020-02-28 NOTE — PROGRESS NOTES
Call placed to PT/OT lead to inform that Dr. Rose is requesting early therapies in AM to help determine safe discharge plan.

## 2020-02-28 NOTE — PLAN OF CARE
At baseline pt lives with her spouse in an independent living apartment and is independent in self-cares using a 4WW, laundry. Pt receives assistance with shopping, medication management, cleaning, cooking, transportation. Pt's spouse reports pt has difficult transferring in and out of vehicles and has had approximately 6 falls in 6 months.    Discharge Planner OT   Patient plan for discharge: home   Current status: Supine to sit with supervision. Required CGA for sitting balance on edge of tall bed. Completed sit to stand with CGA. Had one minor LOB on walk to bathroom with FWW but was able to self correct. Requires cues for walker safety when completing toileting and toilet transfer, completed with Wander. Required cue to sit for LB dressing tasks and completed with modA. Stood at sink for grooming with close supervision. Pt declined sitting in chair and requested to sit EOB for eating. Pt had two LOBs sitting EOB that required modA for correction.   Barriers to return to prior living situation: baseline dementia, dynamic sitting and standing balance unsteadiness, impaired safety awareness, generalized debility, fall risk, level of assist for ADLs and IADLs  Recommendations for discharge: TCU  Rationale for recommendations: Pt functioning below baseline and will benefit from continued skilled OT to maximize safety and independence. Pt is currently declining TCU placement. If she discharges home, recommend home OT services, 24 hour supervision, and assistance of one with dressing, grooming, bathing, toileting, IADLs, and use of shower chair.          Entered by: Betty Mejia 02/28/2020 8:59 AM

## 2020-02-28 NOTE — PLAN OF CARE
Pt here for rule out stroke. A&Ox4. Neuros slight L droop and LUE weakness due to L wrist injury/abrasion from fall. VSS. Tele NSR. DD3 thin liquid. Up with A1, GB and walker. Voiding without difficulty. Denies pain. Pt scoring green on the aggression stoplight tool. Discharge pending.

## 2020-02-28 NOTE — PROGRESS NOTES
Called by nursing staff with report of MRI without evidence of amyloid angiopathy, eliquis reordered.

## 2020-02-28 NOTE — PROGRESS NOTES
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  No other focal neurologic deficits appreciated.  She reports that she falls to the left every time she falls, though has been falling frequently for the past several months, at least since November evaluation for falls. MRI brain showed no new stroke, but multiple old strokes and severe chronic small vessel ischemic disease. No signs of urinary infection and orthostatic vitals have been negative. TTE with bubble study (EF 55-60% with negative bubble). CTA neck and head show R proximal A1 segment moderate stenosis and left distal common carotid with moderate stenosis. CT head showed right superior cerebellar infarct  - Stroke neurology appreciated  - PT/OT recommended TCU but patient refusing  - Plan for discharge to home in AM with home PT after seen by therapy here as she is still fairly weak and would like to see how she does with all home meds resumed prior to discharge.  - Continue PTA simvastatin 20 mg at bedtime  - Resumed PTA amiodarone, with resumption of lisinopril in AM     Presyncope with fall  Patient reports feeling lightheaded prior to fall.  Cardiac auscultation with 3/6 holosystolic murmur best appreciated right upper sternal border.  Echocardiogram July 2019 demonstrated proximal septal thickening with normal LVEF.     Atrial fibrillation  - resumed PTA amiodarone and eliquis    DVT Prophylaxis: eliquis  Code Status: Full  Code  Expected discharge: Tomorrow, recommended to prior living arrangement with home PT and OT once works with therapy in AM with resumption of PTA BP meds and appears safe for discharge to home    Laya Rose, DO  Text Page (7am - 6pm)    Interval History   Patient seen and examined. No chest pain, shortness of breath, fevers, chills, headache, dizziness, lightheadedness.  and daughter at bedside, provided update to them. Discussed at length that she needs to use her walker for any walking at all.    -Data reviewed today: I reviewed all new labs and imaging results over the last 24 hours. I personally reviewed MRI which shows multiple old infarcts    Physical Exam   Temp: 98.1  F (36.7  C) Temp src: Oral BP: (!) 146/62   Heart Rate: 68 Resp: 18 SpO2: 93 % O2 Device: None (Room air)    Vitals:    02/27/20 0209   Weight: 43.1 kg (95 lb)     Vital Signs with Ranges  Temp:  [97.1  F (36.2  C)-98.4  F (36.9  C)] 98.1  F (36.7  C)  Heart Rate:  [58-78] 68  Resp:  [18] 18  BP: (125-146)/(55-67) 146/62  SpO2:  [93 %-96 %] 93 %  I/O last 3 completed shifts:  In: 340 [P.O.:340]  Out: 350 [Urine:350]    Constitutional: Awake, alert, cooperative, no apparent distress  Respiratory: Clear to auscultation bilaterally, no crackles or wheezing  Cardiovascular: Regular rate and rhythm, normal S1 and S2, and no murmur noted  GI: Normal bowel sounds, soft, non-distended, non-tender  Skin/Integumen: No rashes, no cyanosis, no edema  Other: Lower extremity, R>L but both fairly strong.  strength diminished on left compared to right (but could be secondary to injury to this hand/wrist). Speech clear. CN 2-12 intact.    Medications     - MEDICATION INSTRUCTIONS -       - MEDICATION INSTRUCTIONS -         amiodarone  200 mg Oral Daily     apixaban ANTICOAGULANT  2.5 mg Oral BID     [START ON 2/29/2020] lisinopril  5 mg Oral Daily     simvastatin  20 mg Oral At Bedtime     sodium chloride (PF)  3 mL Intracatheter Q8H        Data   Recent Labs   Lab 20  1837 20  0216   WBC  --  13.6*   HGB  --  15.5   MCV  --  101*   PLT  --  188   NA  --  139   POTASSIUM  --  4.7   CHLORIDE  --  106   CO2  --  27   BUN  --  32*   CR  --  1.12*   ANIONGAP  --  6   RADHA  --  8.0*   GLC  --  156*   ALBUMIN  --  2.5*   PROTTOTAL  --  6.1*   BILITOTAL  --  0.4   ALKPHOS  --  115   ALT  --  36   AST  --  27   TROPI 0.023 <0.015       Recent Results (from the past 24 hour(s))   Echocardiogram Complete - Bubble study    Narrative    [id number]  [id number]  [id number]  [id number]^BRITTANEY^FRANKLYN^JOCELYN           [facility]  Echocardiography Laboratory  [address] [place] [id number]        Name: DELFINO LAUREANO  MRN: [id number]  : 1931  Study Date: 2020 03:08 PM  Age: 88 yrs  Gender: Female  Patient Location: [facility]  Reason For Study: CVA  Ordering Physician: FRANKLYN QUISPE  Referring Physician: Tarsha Merritt  Performed By: King Minor     BSA: 1.4 m2  Height: 60 in  Weight: 95 lb  HR: 63  BP: 143/60 mmHg  _____________________________________________________________________________  __        Procedure  Complete Portable Bubble Echo Adult.  _____________________________________________________________________________  __        Interpretation Summary     1. Left ventricle size is normal. Normal left ventricular systolic function.  Ejection fraction is 55 to 60%.  2. Normal right ventricular systolic function.  3. Mild to moderate aortic regurgitation.  4. Mild mitral stenosis with mean gradient of 4 mmHg at heart rate of 61 bpm.  Compared to prior study, there is no significant change.  _____________________________________________________________________________  __        Left Ventricle  The left ventricle is normal in size. Left ventricular systolic function is  normal. The visual ejection fraction is estimated at 55-60%. Grade II or  moderate diastolic dysfunction. Regional wall motion abnormalities cannot  be  excluded due to limited visualization.     Right Ventricle  The right ventricle is normal in size and function.     Atria  The left atrium is mildly dilated. The right atrium is mildly dilated. A  contrast injection (Bubble Study) was performed that was negative for flow  across the interatrial septum.     Mitral Valve  The mitral valve leaflets appear thickened, but open well. There is moderate  mitral annular calcification. There is mild to moderate (1-2+) mitral  regurgitation. There is mild mitral stenosis. The mean mitral valve gradient  is 3.4 mmHg.        Tricuspid Valve  Normal tricuspid valve. There is mild (1+) tricuspid regurgitation. The right  ventricular systolic pressure is approximated at 33.1 mmHg plus the right  atrial pressure.     Aortic Valve  There is moderate trileaflet aortic sclerosis. There is mild to moderate (1-  2+) aortic regurgitation. The peak AoV pressure gradient is 31.0 mmHg. The  mean AoV pressure gradient is 15.9 mmHg.     Pulmonic Valve  The pulmonic valve is not well visualized.     Vessels  Normal size aorta. Normal size ascending aorta. IVC diameter <2.1 cm  collapsing >50% with sniff suggests a normal RA pressure of 3 mmHg.     Pericardium  There is no pericardial effusion.        Rhythm  Sinus rhythm was noted.  _____________________________________________________________________________  __  MMode/2D Measurements & Calculations  IVSd: 0.88 cm     LVIDd: 5.1 cm  LVIDs: 2.7 cm  LVPWd: 0.98 cm  FS: 46.2 %  LV mass(C)d: 170.4 grams  LV mass(C)dI: 125.2 grams/m2  Ao root diam: 3.5 cm  asc Aorta Diam: 2.9 cm  LVOT diam: 2.0 cm  LVOT area: 3.0 cm2  LA Volume (BP): 44.4 ml  LA Volume Index (BP): 32.6 ml/m2  RWT: 0.39           Doppler Measurements & Calculations  MV E max aleena: 158.0 cm/sec  MV A max aleena: 94.6 cm/sec  MV E/A: 1.7  MV max P.1 mmHg  MV mean PG: 3.4 mmHg  MV V2 VTI: 54.2 cm  MVA(VTI): 2.8 cm2  MV dec slope: 567.6 cm/sec2  Ao V2 max: 277.5 cm/sec  Ao max PG:  31.0 mmHg  Ao V2 mean: 185.1 cm/sec  Ao mean PG: 15.9 mmHg  Ao V2 VTI: 63.9 cm  NENA(I,D): 2.4 cm2  NENA(V,D): 2.3 cm2  LV V1 max P.9 mmHg  LV V1 max: 211.5 cm/sec  LV V1 VTI: 49.6 cm  SV(LVOT): 150.4 ml  SI(LVOT): 110.6 ml/m2  PA acc time: 0.11 sec  TR max evin: 287.8 cm/sec  TR max P.1 mmHg  Pulm Sys Evin: 58.7 cm/sec  Pulm Mackenzie Evin: 41.2 cm/sec  Pulm S/D: 1.4  AV Evin Ratio (DI): 0.76  NENA Index (cm2/m2): 1.7  E/E' av.8  Lateral E/e': 24.6  Medial E/e': 33.0              _____________________________________________________________________________  __        Report approved by: Hay Hdz 2020 04:31 PM      MR Cervical Spine w/o & w Contrast    Narrative    EXAM: MR CERVICAL SPINE W/O and W CONTRAST  LOCATION: [facility]  DATE/TIME: 2020 9:57 PM    INDICATION: Frequent falls. Weakness. Hyperreflexia.  COMPARISON: None.  CONTRAST: 4mL Gadavist  TECHNIQUE: Without and with IV contrast.    FINDINGS:   Motion artifact limits aspects of examination.    No cord signal abnormality or pathologic enhancement identified given motion artifact.    Scattered small osseous hemangiomas. No concerning bone marrow signal changes are identified.    Vertebral heights maintained.    C7-T1: Slight anterolisthesis.  T2-T3: Mild anterolisthesis.  Findings likely degenerative.    No additional subluxations are identified.    Multilevel degenerative disc disease manifested by disc desiccation and endplate osteophytosis.    Multilevel uncovertebral and facet arthropathy.    Craniovertebral junction and C1-C2: No significant thecal sac stenosis.    C2-C3: No significant bulge or posterior disc protrusion. Uncovertebral facet degenerative change. No significant thecal sac stenosis. No significant left foraminal stenosis. Mild right foraminal stenosis.     C3-C4: Bulge. Superimposed small posterior disc extrusion extending mildly above and below the disc margin. Uncovertebral facet degenerative  change. Mild thecal sac stenosis measuring 9 mm in AP dimension. Mild left foraminal stenosis. Mild right   foraminal stenosis.     C4-C5: Mild bulge. Uncovertebral facet degenerative change. No significant thecal sac stenosis. Mild left foraminal stenosis. Mild right foraminal stenosis.     C5-C6: Mild bulge. Uncovertebral facet degenerative change. No significant thecal sac stenosis. Mild left foraminal stenosis. Mild right foraminal stenosis.     C6-C7: No significant bulge or posterior disc protrusion. Uncovertebral facet degenerative change. No significant thecal sac stenosis. Mild left foraminal stenosis. Mild right foraminal stenosis.     C7-T1: Slight anterolisthesis. Small superimposed bulge. Facet degenerative change. No significant thecal sac stenosis. No significant left foraminal stenosis. No significant right foraminal stenosis.    ADDITIONAL FINDINGS:    Right vertebral artery flow-void not well-visualized. Please defer to CTA neck 02/27/2020.      Impression    IMPRESSION:  1.  No cord abnormality or pathologic enhancement identified.  2.  Degenerative changes described above.  3.  No critical thecal sac stenosis.  4.  Additional findings above.     MRI Brain w & w/o contrast    Narrative    EXAM: MR BRAIN W/O and W CONTRAST  LOCATION: [facility]  DATE/TIME: 2/27/2020 8:27 PM    INDICATION: Fall with blunt head injury and small age indeterminate infarcts right superior cerebellar hemisphere.  COMPARISON: 02/27/2020.  CONTRAST: 4mL Gadavist  TECHNIQUE: MRI brain without and with contrast.    FINDINGS: On the diffusion-weighted images there is no evidence of acute ischemia or restricted diffusion. There is shine through artifact visualized. There is no discrete mass lesion or midline shift. There is no extra-axial fluid collection or acute   intracranial hemorrhage. On the FLAIR and T2-weighted images there are multiple foci of high signal within the periventricular and subcortical white  matter along with old lacunar infarcts involving the cerebellar hemispheres bilaterally. The ventricular   system, basal cisterns and the cortical sulci are consistent with diffuse volume loss. Following the administration of contrast no abnormal enhancement is visualized. There is no evidence of cerebellar tonsillar ectopia. Corpus callosum and the sella   region have appropriate configuration and signal intensity for the patient's age. The orbit regions are unremarkable. There is no significant paranasal sinus disease. The mastoid air cells and the middle ear regions are clear.      Impression    IMPRESSION:  1. No discrete mass lesion, hemorrhage or focal area of acute ischemia.  2. Diffuse age related changes along with old lacunar infarcts cerebellar hemispheres bilaterally.  3. No abnormal enhancement.

## 2020-02-29 NOTE — PLAN OF CARE
A&Ox4. Neuros slight L droop and LUE weakness due to L wrist abrasion from fall. VSS. Tele SB/SD. Regular diet. Up with A1, GB and walker. Voiding frequently with urgency. PVR of 46. No BM this shift. Denies pain. Pt scoring green on the aggression stoplight tool. Plan for discharge home today.

## 2020-02-29 NOTE — PLAN OF CARE
Patient admitted with dizziness/weakness with fall and found to have old strokes.  Neuro exam intact except disorientation to year (thinks its 2040) slight left droop and nystagmus, slight LUE drift noted during NIH.  Voids frequently, no BM today.  VSS, tele SB, up with assist of one/GB/W.  Discharge instructions for stroke reviewed with patient and family for reference, (S&S of stroke, personal risk factors, importance of calling 911 for return of symptoms, etc.)  encouraged use of walker for ALL activity, dressing from a sitting position and other fall risk factors discussed.  Discharge instructions reviewed with patient and family with teachback from  Ed. Discharged to home ([place]) with home care.

## 2020-02-29 NOTE — PROGRESS NOTES
Writer met with patient, spouse and son at the bedside.  Patient is medically stable for discharge today.  Patient was evaluated by therapies and recommendation was for TCU vs homecare.  Per discharging provider family has discussed discharge planning and they will discharge home with homecare today.  Writer introduced role in safe discharge planning.  Patient and family provided with list per medicare choices  Pt/family was given the Medicare Compare List for Home Care, with associated star ratings to assist with choices for referrals/discharge planning Yes  Education was given to pt/family that star ratings are updated/maintained by Medicare and can be reviewed by visiting www.medicare.gov Yes    Patient and family agreeable to [facility] at discharge. Writer sent referral to [facility] and confirmed that the address and preferred number in Epic are correct as listed.  Patient and family identify no further needs for discharge at this time.  Writer updated bedside and she will communicate with the son when discharge is ready as he will be providing transportation to [place]. No further needs identified.

## 2020-02-29 NOTE — DISCHARGE INSTRUCTIONS
Your risk factors for stroke or TIA (transient ischemic attack):    Your Risk Factors Your Results Normal Ranges   High blood pressure BP Readings from Last 1 Encounters:   02/29/20 121/57    Less than 120/80   Cholesterol              Total Lab Results   Component Value Date    CHOL 186 02/27/2020      Less than 150    Triglycerides   Lab Results   Component Value Date    TRIG 222 02/27/2020    Less than 150   LDL Lab Results   Component Value Date    LDL 65 02/27/2020       Less than 70   HDL Lab Results   Component Value Date    HDL 77 02/27/2020            Greater than 40 (men)  Greater than 50 (women)   Diabetes Recent Labs   Lab 02/29/20  0527   GLC 92    Fasting blood glucose    Smoking/tobacco use  Quit smoking and tobacco   Overweight  Lose 1-2 pounds a week   Lack of exercise  30 minutes moderate activity each day   Other risk factors include carotid (neck) artery disease, atrial fibrillation and stress. You may be on new medicine to treat high blood pressure, cholesterol, diabetes or atrial fibrillation.    Understanding Stroke Booklet given to patient. Please refer to booklet for further information.    Stroke warning signs and symptoms - CALL 911 right away for:  - Sudden numbness or weakness in the face, arm or leg (often on one side of the body).  - Sudden confusion or trouble understanding what is going on.  - Sudden blurred or decreased vision in one or both eyes.  - Sudden trouble speaking, loss of balance, dizziness or problems with coordination.  - Sudden, severe headache for no reason.  - Fainting or seizures.  - Symptoms may go away then come back suddenly.

## 2020-02-29 NOTE — DISCHARGE SUMMARY
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    Hospital Course   Polly De La Torre was admitted on 2/27/2020.  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  No other focal neurologic deficits appreciated.  She reports that she falls to the left every time she falls, though has been falling frequently for the past several months, at least since November evaluation for falls. MRI brain showed no new stroke, but multiple old strokes and severe chronic small vessel ischemic disease. No signs of urinary infection and orthostatic vitals have been negative. TTE with bubble study (EF 55-60% with negative bubble). CTA neck and head show R proximal A1 segment moderate stenosis and left distal common carotid with moderate stenosis. CT head showed right superior cerebellar infarct. Worked with PT/OT and recommended for frequent supervision at home with home health therapies vs TCU.  - Stroke neurology appreciated, no follow up indicated  - After  discussion with family, opted for patient to go home with home therapies instead of TCU considering her baseline impaired cognition  - PT/OT/RN ordered for discharge  - Recommended to use her walker AT ALL TIMES and this was reiterated with her multiple times.  - Family plans to help come up with laundry schedule to help with ADLs  - Continue PTA simvastatin 20mg  - Tolerated resumption of medications and worked well with therapy     Presyncope with fall  Patient reported feeling lightheaded prior to fall. Cardiac auscultation with 3/6 holosystolic murmur best appreciated right upper sternal border. Echocardiogram July 2019 demonstrated proximal septal thickening with normal LVEF.   - Negative orthostatic vitals, no further lightheadedness or dizziness even after medications resumed.     Atrial fibrillation  Aortic valve insufficiency  Hypertension  Dyslipidemia  - resumed PTA amiodarone, simvastatin and eliquis  - Follow up with Dr Harrington as already scheduled in April.    Recent bronchitis, cough?  - Previously on prednisone, discontinue on discharge    Laya Rose, DO    Significant Results and Procedures   See below    Pending Results   NA    Code Status   Full Code       Primary Care Physician   Tarsha Merritt    Physical Exam   Temp: 97.4  F (36.3  C) Temp src: Oral BP: 121/57 Pulse: 62 Heart Rate: 67 Resp: 16 SpO2: 96 % O2 Device: None (Room air)    Vitals:    02/27/20 0209   Weight: 43.1 kg (95 lb)     Vital Signs with Ranges  Temp:  [97.4  F (36.3  C)-98.2  F (36.8  C)] 97.4  F (36.3  C)  Pulse:  [62] 62  Heart Rate:  [54-67] 67  Resp:  [16-18] 16  BP: (121-165)/(52-72) 121/57  SpO2:  [94 %-97 %] 96 %  I/O last 3 completed shifts:  In: 200 [P.O.:200]  Out: 1125 [Urine:1125]    Constitutional: Awake, alert, cooperative, no apparent distress.  Eyes: Conjunctiva and pupils examined and normal.  HEENT: Moist mucous membranes, normal dentition.  Respiratory: Clear to auscultation bilaterally, no crackles or  wheezing.  Cardiovascular: Regular rate and rhythm, normal S1 and S2, + systolic murmur  GI: Soft, non-distended, non-tender, normal bowel sounds.  Lymph/Hematologic: No anterior cervical or supraclavicular adenopathy.  Skin: No rashes, no cyanosis, no edema. Left wrist with large ecchymosis, healing  Musculoskeletal: No joint swelling, erythema or tenderness.  Neurologic: Cranial nerves 2-12 intact, left lower extremity slightly weaker than right, but otherwise fairly strong. Speech clear.  Psychiatric: Alert, oriented to person, place, no obvious anxiety or depression. Slightly forgetful    Discharge Disposition   Discharged to home  Condition at discharge: Stable    Consultations This Hospital Stay   NEUROLOGY IP CONSULT  PHYSICAL THERAPY ADULT IP CONSULT  OCCUPATIONAL THERAPY ADULT IP CONSULT  SPEECH LANGUAGE PATH ADULT IP CONSULT  SWALLOW EVAL SPEECH PATH AT BEDSIDE IP CONSULT  SMOKING CESSATION PROGRAM IP CONSULT  PATIENT LEARNING CENTER IP CONSULT  SOCIAL WORK IP CONSULT  CARE COORDINATOR IP CONSULT    Time Spent on this Encounter   ILaya DO, personally saw the patient today and spent greater than 30 minutes discharging this patient.    Discharge Orders      Home Care PT Referral for Hospital Discharge      Home Care OT Referral for Hospital Discharge      Home care nursing referral      Reason for your hospital stay    Dizziness, weakness, found to have old strokes     Follow-up and recommended labs and tests     Follow up with primary care provider, Tarsha Merritt, within 7 days for hospital follow- up.  No follow up labs or test are needed.     Activity    Your activity upon discharge: activity as tolerated, using walker at all times     Discharge Instructions    Use your walker at all times.    Do not take prednisone on discharge     MD face to face encounter    Documentation of Face to Face and Certification for Home Health Services    I certify that patient: Polly FERRARA Boradarrell is under  my care and that I, or a nurse practitioner or physician's assistant working with me, had a face-to-face encounter that meets the physician face-to-face encounter requirements with this patient on: 2/28/2020.    This encounter with the patient was in whole, or in part, for the following medical condition, which is the primary reason for home health care: hx CVA with dizziness, weakness.    I certify that, based on my findings, the following services are medically necessary home health services: Nursing, Occupational Therapy and Physical Therapy.    My clinical findings support the need for the above services because: Nurse is needed: To provide assessment and oversight required in the home to assure adherence to the medical plan due to: impairment in cognition, Occupational Therapy Services are needed to assess and treat cognitive ability and address ADL safety due to impairment in cognition, increased weakness. and Physical Therapy Services are needed to assess and treat the following functional impairments: decreased endurance and strength, some residual left sided weakness.    Further, I certify that my clinical findings support that this patient is homebound (i.e. absences from home require considerable and taxing effort and are for medical reasons or [facility] services or infrequently or of short duration when for other reasons) because: Requires walker to leave the home,  also similarly weak.    Based on the above findings. I certify that this patient is confined to the home and needs intermittent skilled nursing care, physical therapy and/or speech therapy.  The patient is under my care, and I have initiated the establishment of the plan of care.  This patient will be followed by a physician who will periodically review the plan of care.  Physician/Provider to provide follow up care: Tarsha Merritt    Attending hospital physician (the Medicare certified [facility] provider): Laya Rose  "DO  Physician Signature: See electronic signature associated with these discharge orders.  Date: 2/28/2020     Full Code     Diet    Follow this diet upon discharge: Regular Diet Adult     Discharge Medications   Current Discharge Medication List      CONTINUE these medications which have NOT CHANGED    Details   amiodarone (PACERONE/CODARONE) 200 MG tablet TAKE 1 TABLET DAILY  Qty: 30 tablet, Refills: 12    Associated Diagnoses: Atrial fibrillation with RVR (H)      apixaban ANTICOAGULANT (ELIQUIS) 2.5 MG tablet Take 1 tablet (2.5 mg) by mouth 2 times daily  Qty: 180 tablet, Refills: 3    Associated Diagnoses: Atrial fibrillation with RVR (H)      lisinopril (PRINIVIL/ZESTRIL) 5 MG tablet Take 1 tablet (5 mg) by mouth daily  Qty: 90 tablet, Refills: 3    Associated Diagnoses: Atrial fibrillation with RVR (H)      simvastatin (ZOCOR) 20 MG tablet Take 1 tablet (20 mg) by mouth At Bedtime  Qty: 90 tablet, Refills: 3    Associated Diagnoses: Hyperlipidemia LDL goal <130      order for DME Equipment being ordered: walking cane  Qty: 1 each, Refills: 3    Associated Diagnoses: Falls frequently; Generalized muscle weakness         STOP taking these medications       predniSONE (DELTASONE) 20 MG tablet Comments:   Reason for Stopping:             Allergies   Allergies   Allergen Reactions     Cetirizine      Made her so tired after taking doesn't want to take again,just can't live that way([employer])     Darvon [Propoxyphene]      Methadone      DARVON  ---  \"crawling\" sensation      Data   Most Recent 3 CBC's:  Recent Labs   Lab Test 02/29/20 0527 02/27/20 0216 11/29/19  1253   WBC 10.2 13.6* 8.1   HGB 15.0 15.5 15.3   MCV 99 101* 101*   * 188 223      Most Recent 3 BMP's:  Recent Labs   Lab Test 02/29/20 0527 02/27/20 0216 11/29/19  1253    139 140   POTASSIUM 4.2 4.7 4.0   CHLORIDE 101 106 108   CO2 31 27 29   BUN 20 32* 29   CR 0.94 1.12* 1.05*   ANIONGAP 3 6 3   RADHA 7.8* 8.0* 8.5   GLC 92 156* 110* "     Most Recent 2 LFT's:  Recent Labs   Lab Test 02/27/20  0216 06/08/19  2107   AST 27 8   ALT 36 11   ALKPHOS 115 63   BILITOTAL 0.4 0.3     Most Recent INR's and Anticoagulation Dosing History:  Anticoagulation Dose History     Recent Dosing and Labs Latest Ref Rng & Units 1/12/2010 1/4/2011 1/4/2011 2/8/2011    INR 0.86 - 1.14 1.06 0.98 0.98 0.94        Most Recent 3 Troponin's:  Recent Labs   Lab Test 02/27/20  1837 02/27/20 0216 11/29/19  1253   TROPI 0.023 <0.015 <0.015     Most Recent Cholesterol Panel:  Recent Labs   Lab Test 02/27/20 0216   CHOL 186   LDL 65   HDL 77   TRIG 222*     Most Recent 6 Bacteria Isolates From Any Culture (See EPIC Reports for Culture Details):  Recent Labs   Lab Test 11/29/19  1316 06/08/19  2107 09/15/17  2127 06/13/12  1720   CULT <10,000 colonies/mL  mixed urogenital kolby   <10,000 colonies/mL  urogenital kolby  Susceptibility testing not routinely done   10,000 to 50,000 colonies/mL  mixed urogenital kolby  Susceptibility testing not routinely done   10 to 50,000 colonies/mL Escherichia coli     Most Recent TSH, T4 and A1c Labs:  Recent Labs   Lab Test 02/27/20 0216 06/08/19 2107   TSH  --  3.18   A1C 6.0*  --      Results for orders placed or performed during the hospital encounter of 02/27/20   Head CT w/o contrast    Addendum: 2/27/2020    Right superior cerebellar small age-indeterminate infarct new compared to CT head 11/29/2019.    Dr Rangel was notified by Dr Saúl German at 4:55 AM 02/27/2020.      Narrative    EXAM: CT HEAD W/O CONTRAST  LOCATION: [facility]  DATE/TIME: 2/27/2020 3:01 AM    INDICATION: Fall. Blunt head injury. On Eliquis.  COMPARISON: CT head 11/29/2019.  TECHNIQUE: Routine without IV contrast. Multiplanar reformats. Dose reduction techniques were used.    FINDINGS:  INTRACRANIAL CONTENTS: No intracranial hemorrhage, extraaxial collection, or mass effect.  Severe presumed chronic small vessel ischemic changes. Moderate generalized  volume loss. No hydrocephalus.     Right superior cerebellar hemisphere small 5 mm age-indeterminate infarct.    Right frontal lobe centrum signal value white matter small remote infarct.    VISUALIZED ORBITS/SINUSES/MASTOIDS: No intraorbital abnormality. No paranasal sinus mucosal disease. No middle ear or mastoid effusion.    BONES/SOFT TISSUES: No acute abnormality.    Vascular calcifications.    Bilateral TMJ degenerative changes.      Impression    IMPRESSION:  1.  Right superior cerebellar hemisphere small age-indeterminate infarct.  2.  Otherwise, no acute intracranial process identified.  3.  Chronic intracranial changes described above.  4.  Additional findings above.   CTA Head Neck with Contrast    Narrative    EXAM: CTA  HEAD NECK WITH CONTRAST  LOCATION: [facility]  DATE/TIME: 2/27/2020 6:04 AM    INDICATION: Fall. Blunt head injury. Small age-indeterminate infarct right cerebellum.  COMPARISON: Correlation with CT head 02/27/2020.  CONTRAST: 70mL Isovue-370  TECHNIQUE: Head and neck CT angiogram with IV contrast. Axial helical CT images of the head and neck vessels obtained during the arterial phase of intravenous contrast administration. Axial 2D reconstructed images and multiplanar 3D MIP reconstructed   images of the head and neck vessels were performed by the technologist. Dose reduction techniques were used. All stenosis measurements made according to NASCET criteria unless otherwise specified.    FINDINGS:   HEAD CTA:  ANTERIOR CIRCULATION:  Right proximal A1 segment moderate stenosis.     Otherwise, no significant stenosis/occlusion, aneurysm, or high flow vascular malformation.     Hypoplastic right A1 segment. Right fetal origin PCA. Codominant left posterior communicating artery and left P1 segment.    POSTERIOR CIRCULATION:   No significant stenosis/occlusion, aneurysm, or high flow vascular malformation.     Dominant left and smaller right vertebral artery contribute to a  normal basilar artery.     DURAL VENOUS SINUSES: Not well evaluated on a technical basis.    NECK CTA:  RIGHT CAROTID:   No significant stenosis or dissection.    LEFT CAROTID:  Left distal common carotid artery moderate stenosis.     Otherwise, no significant stenosis or dissection.    VERTEBRAL ARTERIES: No significant stenosis or dissection. Balanced vertebral arteries.    AORTIC ARCH: Classic aortic arch anatomy with no significant stenosis at the origin of the great vessels.    ARTERIAL PLAQUE: Calcified/noncalcified plaque with multifocal small ulcerations seen within the aorta, left subclavian artery, right brachiocephalic artery, right subclavian artery, left greater than right carotid bifurcations and carotid bulbs, left   precavernous ICA, bilateral carotid siphons.    NONVASCULAR STRUCTURES:   Scattered foci of air within the venous system nonspecific likely iatrogenic.    Lung apices demonstrate peripheral opacities. Findings reflect underlying fibrosis such as nonspecific interstitial fibrosis, or chronic infectious inflammatory process.    Degenerative changes noted within the spine.      Impression    IMPRESSION:   HEAD CTA:   1.  Right proximal A1 segment moderate stenosis.  2.  Remaining proximal intracranial arteries demonstrate no significant stenosis or occlusion.    NECK CTA:  1.  Left distal common carotid artery moderate stenosis.  2.  Remains cervical arteries demonstrate no significant stenosis or occlusion.  3.  Additional findings above.   MRI Brain w & w/o contrast    Narrative    EXAM: MR BRAIN W/O and W CONTRAST  LOCATION: [facility]  DATE/TIME: 2/27/2020 8:27 PM    INDICATION: Fall with blunt head injury and small age indeterminate infarcts right superior cerebellar hemisphere.  COMPARISON: 02/27/2020.  CONTRAST: 4mL Gadavist  TECHNIQUE: MRI brain without and with contrast.    FINDINGS: On the diffusion-weighted images there is no evidence of acute ischemia or restricted  diffusion. There is shine through artifact visualized. There is no discrete mass lesion or midline shift. There is no extra-axial fluid collection or acute   intracranial hemorrhage. On the FLAIR and T2-weighted images there are multiple foci of high signal within the periventricular and subcortical white matter along with old lacunar infarcts involving the cerebellar hemispheres bilaterally. The ventricular   system, basal cisterns and the cortical sulci are consistent with diffuse volume loss. Following the administration of contrast no abnormal enhancement is visualized. There is no evidence of cerebellar tonsillar ectopia. Corpus callosum and the sella   region have appropriate configuration and signal intensity for the patient's age. The orbit regions are unremarkable. There is no significant paranasal sinus disease. The mastoid air cells and the middle ear regions are clear.      Impression    IMPRESSION:  1. No discrete mass lesion, hemorrhage or focal area of acute ischemia.  2. Diffuse age related changes along with old lacunar infarcts cerebellar hemispheres bilaterally.  3. No abnormal enhancement.   MR Cervical Spine w/o & w Contrast    Narrative    EXAM: MR CERVICAL SPINE W/O and W CONTRAST  LOCATION: [facility]  DATE/TIME: 2/27/2020 9:57 PM    INDICATION: Frequent falls. Weakness. Hyperreflexia.  COMPARISON: None.  CONTRAST: 4mL Gadavist  TECHNIQUE: Without and with IV contrast.    FINDINGS:   Motion artifact limits aspects of examination.    No cord signal abnormality or pathologic enhancement identified given motion artifact.    Scattered small osseous hemangiomas. No concerning bone marrow signal changes are identified.    Vertebral heights maintained.    C7-T1: Slight anterolisthesis.  T2-T3: Mild anterolisthesis.  Findings likely degenerative.    No additional subluxations are identified.    Multilevel degenerative disc disease manifested by disc desiccation and endplate  osteophytosis.    Multilevel uncovertebral and facet arthropathy.    Craniovertebral junction and C1-C2: No significant thecal sac stenosis.    C2-C3: No significant bulge or posterior disc protrusion. Uncovertebral facet degenerative change. No significant thecal sac stenosis. No significant left foraminal stenosis. Mild right foraminal stenosis.     C3-C4: Bulge. Superimposed small posterior disc extrusion extending mildly above and below the disc margin. Uncovertebral facet degenerative change. Mild thecal sac stenosis measuring 9 mm in AP dimension. Mild left foraminal stenosis. Mild right   foraminal stenosis.     C4-C5: Mild bulge. Uncovertebral facet degenerative change. No significant thecal sac stenosis. Mild left foraminal stenosis. Mild right foraminal stenosis.     C5-C6: Mild bulge. Uncovertebral facet degenerative change. No significant thecal sac stenosis. Mild left foraminal stenosis. Mild right foraminal stenosis.     C6-C7: No significant bulge or posterior disc protrusion. Uncovertebral facet degenerative change. No significant thecal sac stenosis. Mild left foraminal stenosis. Mild right foraminal stenosis.     C7-T1: Slight anterolisthesis. Small superimposed bulge. Facet degenerative change. No significant thecal sac stenosis. No significant left foraminal stenosis. No significant right foraminal stenosis.    ADDITIONAL FINDINGS:    Right vertebral artery flow-void not well-visualized. Please defer to CTA neck 02/27/2020.      Impression    IMPRESSION:  1.  No cord abnormality or pathologic enhancement identified.  2.  Degenerative changes described above.  3.  No critical thecal sac stenosis.  4.  Additional findings above.     Echocardiogram Complete - Bubble study    Narrative    [id number]  [id number]  [id number]  [id number]^BRITTANEY^FRANKLYN^JOCELYN           [facility]  Echocardiography Laboratory  [address] [place] [id number]        Name: DELFINO LAUREANO  MRN:  [id number]  : 1931  Study Date: 2020 03:08 PM  Age: 88 yrs  Gender: Female  Patient Location: [facility]  Reason For Study: CVA  Ordering Physician: FRANKLYN QUISPE  Referring Physician: Tarsha Merritt  Performed By: King Minor     BSA: 1.4 m2  Height: 60 in  Weight: 95 lb  HR: 63  BP: 143/60 mmHg  _____________________________________________________________________________  __        Procedure  Complete Portable Bubble Echo Adult.  _____________________________________________________________________________  __        Interpretation Summary     1. Left ventricle size is normal. Normal left ventricular systolic function.  Ejection fraction is 55 to 60%.  2. Normal right ventricular systolic function.  3. Mild to moderate aortic regurgitation.  4. Mild mitral stenosis with mean gradient of 4 mmHg at heart rate of 61 bpm.  Compared to prior study, there is no significant change.  _____________________________________________________________________________  __        Left Ventricle  The left ventricle is normal in size. Left ventricular systolic function is  normal. The visual ejection fraction is estimated at 55-60%. Grade II or  moderate diastolic dysfunction. Regional wall motion abnormalities cannot be  excluded due to limited visualization.     Right Ventricle  The right ventricle is normal in size and function.     Atria  The left atrium is mildly dilated. The right atrium is mildly dilated. A  contrast injection (Bubble Study) was performed that was negative for flow  across the interatrial septum.     Mitral Valve  The mitral valve leaflets appear thickened, but open well. There is moderate  mitral annular calcification. There is mild to moderate (1-2+) mitral  regurgitation. There is mild mitral stenosis. The mean mitral valve gradient  is 3.4 mmHg.        Tricuspid Valve  Normal tricuspid valve. There is mild (1+) tricuspid regurgitation. The right  ventricular systolic pressure is approximated at  33.1 mmHg plus the right  atrial pressure.     Aortic Valve  There is moderate trileaflet aortic sclerosis. There is mild to moderate (1-  2+) aortic regurgitation. The peak AoV pressure gradient is 31.0 mmHg. The  mean AoV pressure gradient is 15.9 mmHg.     Pulmonic Valve  The pulmonic valve is not well visualized.     Vessels  Normal size aorta. Normal size ascending aorta. IVC diameter <2.1 cm  collapsing >50% with sniff suggests a normal RA pressure of 3 mmHg.     Pericardium  There is no pericardial effusion.        Rhythm  Sinus rhythm was noted.  _____________________________________________________________________________  __  MMode/2D Measurements & Calculations  IVSd: 0.88 cm     LVIDd: 5.1 cm  LVIDs: 2.7 cm  LVPWd: 0.98 cm  FS: 46.2 %  LV mass(C)d: 170.4 grams  LV mass(C)dI: 125.2 grams/m2  Ao root diam: 3.5 cm  asc Aorta Diam: 2.9 cm  LVOT diam: 2.0 cm  LVOT area: 3.0 cm2  LA Volume (BP): 44.4 ml  LA Volume Index (BP): 32.6 ml/m2  RWT: 0.39           Doppler Measurements & Calculations  MV E max evin: 158.0 cm/sec  MV A max evin: 94.6 cm/sec  MV E/A: 1.7  MV max P.1 mmHg  MV mean PG: 3.4 mmHg  MV V2 VTI: 54.2 cm  MVA(VTI): 2.8 cm2  MV dec slope: 567.6 cm/sec2  Ao V2 max: 277.5 cm/sec  Ao max P.0 mmHg  Ao V2 mean: 185.1 cm/sec  Ao mean PG: 15.9 mmHg  Ao V2 VTI: 63.9 cm  NENA(I,D): 2.4 cm2  NENA(V,D): 2.3 cm2  LV V1 max P.9 mmHg  LV V1 max: 211.5 cm/sec  LV V1 VTI: 49.6 cm  SV(LVOT): 150.4 ml  SI(LVOT): 110.6 ml/m2  PA acc time: 0.11 sec  TR max evin: 287.8 cm/sec  TR max P.1 mmHg  Pulm Sys Evin: 58.7 cm/sec  Pulm Mackenzie Evin: 41.2 cm/sec  Pulm S/D: 1.4  AV Evin Ratio (DI): 0.76  NENA Index (cm2/m2): 1.7  E/E' av.8  Lateral E/e': 24.6  Medial E/e': 33.0              _____________________________________________________________________________  __        Report approved by: Hay Hdz 2020 04:31 PM

## 2020-02-29 NOTE — PLAN OF CARE
Patient admitted with fall.  MRI negative for new stroke.  Neuro exam exhibits slight left facial droop, nystagmus R>L and slightly weak LUE d/t wrist pain.  VSS, tele NSR. Up with assist of one/GB/W, dribbles when urinates, BM today.  UA sent, negative for UTI. Orthostatics WDL.  Patient was not using walker at home when she fell,  reports she does fall when not using it. Plan for therapies first thing in AM to establish safe discharge plan. Discharge to TCU vs. Home with home therapies tomorrow.

## 2020-02-29 NOTE — PROVIDER NOTIFICATION
"Text placed to Dr. Rose: \"Therapies have seen patient, no changes overnight except for very frequent voiding. Thanks!\"  "

## 2020-02-29 NOTE — PLAN OF CARE
Discharge Planner OT   Patient plan for discharge: possible discharge today  Current status: pt is SBA with FWW, SBA with grooming at sink. Pt states she stands to complete LE dressing. Educated on importance of sitting in chair or on EOB due to decreased balance. Pt states she understands, but baseline dementia anticipate she will need continued teaching.  also expreses concern with pt taking showers. He states she is unsteady. Recommend shower chair and increased assist for bathing from staff.   Barriers to return to prior living situation: decreased balance, decreased cognition with baseline dementia  Recommendations for discharge: pt does not want to go to TCU, anticipate pt's cognition is at her baseline as there is documented dementia in the chart. Pt would benefit from home with home OT/PT for strengthening and reinforcement in using walker and shower chair etc. Would also need assist with all functional mobility due to decreased balance and forgetfulness in using her walker. Would also benefit from increased supervision from staff with AM/PM dressing and showers to  increase safety.   Rationale for recommendations: anticipate that patient is close to her baseline and forgetfulness is due to baseline dementia and lack of insight into deficits. Pt would prefer to go home and may do better in her familiar environment with increased services for bathing/dressing/functional mobility and home OT/PT       Entered by: Melida Ramachandran 02/29/2020 9:31 AM

## 2020-02-29 NOTE — PROGRESS NOTES
Sw Progress Note  Chart Reviewed, Pt discussed in Interdisciplinary Rounds.   Pt is planning to discharge back to her apartment at [facility] with her  and home care services.    Intervention:   SYD set up [facility] services,  JES contacted  April at [facility] to [facility]    Team Members notified:   SYD RN HUB,BB      Plan: Discharge back to [facility] with home care services through [facility].    PRUDENCIO Walker  [facility]  [phone]

## 2020-02-29 NOTE — PLAN OF CARE
Discharge Planner PT   Patient plan for discharge: Home  Current status: Pt was received sitting up in the bs chair, agreed to PT interventions. Pt ambulated 400 ft using RW and SBA-CGA for safety incorporating head turns. Pt participated in dynamic standing activities and STS.   Barriers to return to prior living situation: baseline dementia, unsteadiness with dynamic standing balance, fall risk (HARRIS 32/56)  Recommendations for discharge: TCU  Rationale for recommendations: Patient is below baseline for functional mobility & would benefit from continued physical therapy in the setting of a TCU for improving functional mobility, LE strength, balance and tolerance for functional activities in order to return to baseline of functioning.      If patient declining TCU, 24/7 supervision would be recommended for functional mobility with patient using walker for all ambulation. And HH-PT.

## 2020-03-01 NOTE — PLAN OF CARE
Occupational Therapy Discharge Summary    Reason for therapy discharge:    Discharged to home with home therapy.    Progress towards therapy goal(s). See goals on Care Plan in [facility] electronic health record for goal details.  Goals not met.  Barriers to achieving goals:   discharge from facility.    Therapy recommendation(s):    Continued therapy is recommended.  Rationale/Recommendations:  Pt declined recommendation of TCU at discharge. Upon return home, recommend A for ADLs and home OT.

## 2020-03-02 NOTE — TELEPHONE ENCOUNTER
Chief Complaint: Syncope, Unspecified Syncope Type,  SAT 29-FEB-2020 1 / 1    [phone] ([place])

## 2020-03-03 NOTE — TELEPHONE ENCOUNTER
Wound care also added to small skin tear per Home care YULY Urbano.  Home care orders approved.  Lindsay Woo RN

## 2020-03-03 NOTE — TELEPHONE ENCOUNTER
Reason for Call:  Home Health Care    Sundeep with [facility] called regarding (reason for call):     Orders are needed for this patient.     PT: eval    OT: eval    Skilled Nursing: 1x1 week, 3x1 week, 2x1 week, 1x1 week, 3 prn    : assessment    Phone Number Homecare Nurse can be reached at: [phone]    Can we leave a detailed message on this number? YES    Best Time: any    Call taken on 3/3/2020 at 1:51 PM by Sushila Knowles

## 2020-03-04 NOTE — TELEPHONE ENCOUNTER
"ED/Discharge Protocol    \"Hi, my name is Nkechi Richardson RN, a registered nurse, and I am calling on behalf of Dr. Merritt's office at [place].  I am calling to follow up and see how things are going for you after your recent visit.\"    \"I see that you were in the (ER/UC/IP) on 2/27/2020-2/29/2020.    How are you doing now that you are home?\" patient states she's doing well     Is patient experiencing symptoms that may require a hospital visit?  No    Discharge Instructions    \"Let's review your discharge instructions.  What is/are the follow-up recommendations?  Pt. Response: F/U with PCP    \"Were you instructed to make a follow-up appointment?\"  Pt. Response: Yes.  Has appointment been made?   Yes      \"When you see the provider, I would recommend that you bring your discharge instructions with you.    Medications    \"How many new medications are you on since your hospitalization/ED visit?\"    0-1  \"How many of your current medicines changed (dose, timing, name, etc.) while you were in the hospital/ED visit?\"   0-1  \"Do you have questions about your medications?\"   No  \"Were you newly diagnosed with heart failure, COPD, diabetes or did you have a heart attack?\"   No  For patients on insulin: \"Did you start on insulin in the hospital or did you have your insulin dose changed?\"   No  Post Discharge Medication Reconciliation Status: discharge medications reconciled, continue medications without change.    Was MTM referral placed (*Make sure to put transitions as reason for referral)?   No    Call Summary    \"Do you have any questions or concerns about your condition or care plan at the moment?\"    No  Triage nurse advice given: keep f/u appt with PCP    Patient was in ER 3 times in the past year (assess appropriateness of ER visits.)      \"If you have questions or things don't continue to improve, we encourage you contact us through the main clinic number,  [phone].  Even if the clinic is not open, triage nurses are " "available 24/7 to help you.     We would like you to know that our clinic has extended hours (provide information).  We also have urgent care (provide details on closest location and hours/contact info)\"      \"Thank you for your time and take care!\"    Nkechi LUEVANO RN        "

## 2020-03-04 NOTE — NURSING NOTE
Ava Livingston ,CMA     Informed pt that the [facility] paperwork that she had submitted to us has been signed. Pt says there is another set of paperwork that was faxed. Informed pt that the fax has not been received. Asked pt to deliver the original copies of the paperwork to us and we will send it to the [facility] department for them to process. Pt communicated understanding..

## 2020-03-12 NOTE — TELEPHONE ENCOUNTER
DME form has been signed and faxed to JENNIFER [facility]  [phone]  Called patient.  Spouse answered and I informed him that the order has been faxed.    Caitlyn Figueroa RN on 3/12/2020 at 1:14 PM

## 2020-03-12 NOTE — TELEPHONE ENCOUNTER
DME order is pending per patient request below.  Please complete and sign if appropriate.   Elda Gusman MA

## 2020-03-12 NOTE — TELEPHONE ENCOUNTER
Reason for Call:  Medication or medication refill:    Do you use a [facility]?  Name of the pharmacy and phone number for the current request:    Medical supply on 4th floor in [place]    Name of the medication requested: DME for walker    Other request: pt would like a walker now, is having increasing problems walking    Can we leave a detailed message on this number? YES    Phone number patient can be reached at: Home number on file [phone] (home)    Best Time: any    Call taken on 3/12/2020 at 11:33 AM by Ori Payton

## 2020-03-27 NOTE — TELEPHONE ENCOUNTER
Left message for HC nurse that we will need to check with PCP regarding POLST and how to accomplish this without a visit in clinic.   Triage will follow up with HC nurse on Monday.      Routing to Dr Merritt for advise please.      Gill CANSECO RN,BSN

## 2020-03-27 NOTE — TELEPHONE ENCOUNTER
Reason for call:  Home Healthcare Reason for Call:  Home Health Care    JES Ramirez, with [facility] called regarding (reason for call): would like to speak with a nurse regarding POLST. The pt's wishes to be DNR, but doesn't have POLST filled out. Has the provider ever had a conversation about this with pt? Would  be willing to sign form w/o seeing the pt? Maybe a phone visit?      Phone Number Homecare Nurse can be reached at: [phone]    Can we leave a detailed message on this number? YES    Phone number patient can be reached at: Home number on file [phone] (home)    Best Time: anytime    Call taken on 3/27/2020 at 3:30 PM by Mirian Lloyd

## 2020-03-30 NOTE — TELEPHONE ENCOUNTER
Tarsha Merritt MD Nolan, Sharon, RN; [facility] Triage 10 minutes ago (12:42 PM)       Please schedule for a telephone visit with me soon.        Spoke with JES Ramirez: She will notify Pt/family to call and schedule a telephone visit with PCP     Mariah GROVES RN

## 2020-04-01 NOTE — PROGRESS NOTES
"Polly De La Torre is a 88 year old female who is being evaluated via a telephone visit.      The patient has been notified of following (by VAL MCCORMICK CMA     \"We have found that certain health care needs can be provided without the need for a physical exam.  This service lets us provide the care you need with a short phone conversation.  If a prescription is necessary we can send it directly to your pharmacy.  If lab work is needed we can place an order for that and you can then stop by our lab to have the test done at a later time.    This telephone visit will be conducted via 3 way call with the you (the patient) , the physician/provider, and a me all on the line at the same time.  This allows your physician/provider to have the phone conversation with you while I will be taking notes for your medical record.  We will have full access to your [facility] medical record during this entire phone call.    Since this is like an office visit,  will bill your insurance company for this service.  Please check with your medical insurance if this type of telephone/virtual is covered . You may be responsible for the cost of this service if insurance coverage is denied.  The typical cost is $30 (10min), $59(11-20min) and $85 (21-30min)     If during the course of the call the physician/provider feels a telephone visit is not appropriate, you will not be charged for this service\"    Consent has been obtained for this service by care team member: yes.  See the scanned image in the medical record.    S: The history as provided by the patient to the provider during this 3 way call include medication refill, hyperlipidemia, hypertension     Pertinent parts of the the patient's medical history reviewed and confirmed by the provider included : medication refill, hyperlipidemia, hypertension   Total time of call between patient and provider was 25 minutes     Tarsha AGUIRRE " "signature)  ===================================================    I have reviewed the note as documented above.  This accurately captures the substance of my conversation with the patient,    Additional provider notes:    Chief Complaint:     medication refill, hyperlipidemia, hypertension    HPI:   Patient Polly De La Torre is a very pleasant 88 year old female with history of hyperlipidemia who presents to Internal Medicine clinic today for medication refill, hyperlipidemia, hypertension. Regarding the patient's hyperlipidmeia, the patient's symptoms are stable on current Zocor medication. She is due for a refill of her Zocor cholesterol medication at this time. Regarding the patient's hypertension, the patient reports that her outpatient BP is currently well controlled. No chest pain, headaches, fever or chills.           Current Medications:     Current Outpatient Medications   Medication Sig Dispense Refill     amiodarone (PACERONE/CODARONE) 200 MG tablet TAKE 1 TABLET DAILY 30 tablet 12     apixaban ANTICOAGULANT (ELIQUIS) 2.5 MG tablet Take 1 tablet (2.5 mg) by mouth 2 times daily 180 tablet 3     lisinopril (ZESTRIL) 5 MG tablet Take 1 tablet (5 mg) by mouth daily 90 tablet 3     order for DME Equipment being ordered: walking cane 1 each 3     simvastatin (ZOCOR) 20 MG tablet Take 1 tablet (20 mg) by mouth At Bedtime 90 tablet 3         Allergies:      Allergies   Allergen Reactions     Cetirizine      Made her so tired after taking doesn't want to take again,just can't live that way([employer])     Darvon [Propoxyphene]      Methadone      DARVON  ---  \"crawling\" sensation             Past Medical History:     Past Medical History:   Diagnosis Date     Aortic regurgitation 2015     Aortic valve disorders      Choledocholithiasis     s/p ERCP x 3 2011, ERCP Jan 2014, Dr. Krishnan     Esophageal reflux      Gallstone pancreatitis      Osteoarthrosis, unspecified whether generalized or localized, unspecified " site      Osteopenia          Past Surgical History:     Past Surgical History:   Procedure Laterality Date     C NONSPECIFIC PROCEDURE  age 16    Appy     C NONSPECIFIC PROCEDURE      D&C x 2     CHOLECYSTECTOMY  1995    [facility]         Family Medical History:     Family History   Problem Relation Age of Onset     Hypertension Father      Cerebrovascular Disease Father      Allergies Son      Prostate Cancer No family hx of      Cancer - colorectal No family hx of          Social History:     Social History     Socioeconomic History     Marital status:      Spouse name: Not on file     Number of children: Not on file     Years of education: Not on file     Highest education level: Not on file   Occupational History     Not on file   Social Needs     Financial resource strain: Not on file     Food insecurity     Worry: Not on file     Inability: Not on file     Transportation needs     Medical: Not on file     Non-medical: Not on file   Tobacco Use     Smoking status: Never Smoker     Smokeless tobacco: Never Used   Substance and Sexual Activity     Alcohol use: Yes     Alcohol/week: 0.0 standard drinks     Comment: rarely     Drug use: No     Sexual activity: Yes     Partners: Male   Lifestyle     Physical activity     Days per week: Not on file     Minutes per session: Not on file     Stress: Not on file   Relationships     Social connections     Talks on phone: Not on file     Gets together: Not on file     Attends Yazdanism service: Not on file     Active member of club or organization: Not on file     Attends meetings of clubs or organizations: Not on file     Relationship status: Not on file     Intimate partner violence     Fear of current or ex partner: Not on file     Emotionally abused: Not on file     Physically abused: Not on file     Forced sexual activity: Not on file   Other Topics Concern     Parent/sibling w/ CABG, MI or angioplasty before 65F 55M? Not Asked   Social History Narrative      with 3 kids           Review of System:     Constitutional: Negative for fever or chills  Skin: Negative for rashes  Ears/Nose/Throat: Negative for nasal congestion, sore throat  Respiratory: No shortness of breath, dyspnea on exertion, cough, or hemoptysis  Cardiovascular: Negative for chest pain  Gastrointestinal: Negative for nausea, vomiting  Genitourinary: Negative for dysuria, hematuria  Musculoskeletal: Negative for myalgias  Neurologic: Negative for headaches  Psychiatric: Negative for depression, anxiety  Hematologic/Lymphatic/Immunologic: Negative  Endocrine: Negative  Behavioral: Negative for tobacco use         Diagnostic Test Results:       Recent Labs   Lab Test 02/27/20  0216 05/23/18  0951  08/19/15  0741 08/04/14  0713   CHOL 186 162   < > 165 149   HDL 77 62   < > 58 63   LDL 65 72   < > 79 66   TRIG 222* 140   < > 140 98   CHOLHDLRATIO  --   --   --  2.8 2.4    < > = values in this interval not displayed.         ASSESSMENT/PLAN:     (Z76.0) Encounter for medication refill  (E78.5) Hyperlipidemia LDL goal <130  (primary encounter diagnosis)  Comment: Regarding the patient's hyperlipidmeia, the patient's symptoms are stable on current Zocor medication. She is due for a refill of her Zocor cholesterol medication at this time.   Plan: simvastatin (ZOCOR) 20 MG tablet      (I10) Essential hypertension  Comment: Regarding the patient's hypertension, the patient reports that her outpatient BP is currently well controlled.   Plan: continue current Lisinopril BP medication.        Follow Up Plan:     Patient is instructed to return to Internal Medicine clinic for follow-up visit in 1 month.        Tarsha Merritt MD  Internal Medicine  [facility]

## 2020-04-03 NOTE — TELEPHONE ENCOUNTER
Dr. Merritt,    Reviewed med list with the facility, [facility], pt was moved to memory care for behaviors/argumentative/elopement.    Facility asking if pt should be on any medication for dementia/dementia behaviors?      Their fax:  [phone]

## 2020-04-03 NOTE — TELEPHONE ENCOUNTER
Please notify Angeline that Aricept 5mg once daily medication for memory loss has been sent to [facility]
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
## 2020-04-03 NOTE — TELEPHONE ENCOUNTER
Called Huong back at the [place] office- left detailed VM informed her of Rx  Will fax med order to number below    Tai NI RN

## 2020-04-22 NOTE — PROGRESS NOTES
4/22/20   - Writer received health care information from [facility].  - Medications were reviewed and are current.  - The information faxed to me will be sent to be scanned into pt's chart.    RAQUEL Palomares  (12:42pm)        4/22/20  - Writer spoke w/ YULY Barton w/[facility].  - She has been informed of the telephone visit w/Dr. Harrington at 12:45pm.  - Nurse will be in the room w/Polly during phone visit.  - The number to call:  [phone] (N[place]).  - Health care information and a current med list will be faxed over to me, Saúl MCCULLOUGH LPN  - Pt stated she has been feeling very tired lately.    - Today's VS:  - BP: 157/43  - P 67  - R:  18  - O2: 98% RA  - Temp:  98.2 F  - Wt. (approx) 97.0Lbs    RAQUEL Palomares    Patient is a 88-year-old female who I saw several months ago.  This is a phone visit due to COVID virus precautions.  She is currently a resident at [facility].  We reviewed her recent hospitalization for syncope and fall in February 2020.  Fortunately since that time she has not had any further recurrences and medical adjustments were made.  We also follow her for coronary artery disease, chronic atrial fibrillation and her CVA remotely.  Unfortunately she does have dementia and is in a memory facility.  Patient does not report of any specific complaints.  We also did discuss her clinical status with her nurse who feels that she is at baseline.  No active symptoms are noted.    Recommendations    We have reviewed patient's clinical course and recent cardiac testing.  We have reviewed her current medical therapy and would agree with continuing at present.  Should she have difficulty with fall or balance consideration for watchman can be made however she is quite frail.  I will have her return to clinic in 4 months time for a close follow-up.    Igor Harrington MD

## 2020-04-22 NOTE — LETTER
4/22/2020      RE: Polly De La Torre  [address]  [place] [address]       Dear Colleague,    Thank you for the opportunity to participate in the care of your patient, Polly De La Torre, at the [facility]. Please see a copy of my visit note below.    Patient is a 88-year-old female who I saw several months ago.  This is a phone visit due to COVID virus precautions.  She is currently a resident at [facility].  We reviewed her recent hospitalization for syncope and fall in February 2020.  Fortunately since that time she has not had any further recurrences and medical adjustments were made.  We also follow her for coronary artery disease, chronic atrial fibrillation and her CVA remotely.  Unfortunately she does have dementia and is in a memory facility.  Patient does not report of any specific complaints.  We also did discuss her clinical status with her nurse who feels that she is at baseline.  No active symptoms are noted.    Recommendations    We have reviewed patient's clinical course and recent cardiac testing.  We have reviewed her current medical therapy and would agree with continuing at present.  Should she have difficulty with fall or balance consideration for watchman can be made however she is quite frail.  I will have her return to clinic in 4 months time for a close follow-up.    Please do not hesitate to contact me if you have any questions/concerns.     Sincerely,     Igor Harrington MD

## 2020-04-28 NOTE — TELEPHONE ENCOUNTER
Judi RN calling from [facility].  There are 3 confirmed cases of COVID-19 and are requesting orders for patient to be tested.  [facility] has the capability to test and just needs a written order.    Patient does not have any symptoms, but lives in a locked memory unit and 3 patients have been positive.    Routing to provider for review and advise as appropriate.  RN unsure if order can be placed by support staff or if it needs to come from PCP.    Suki can be reached at: [phone].    Fax orders to: [phone]    OMAYRA Albright, RN  [employer] Triage

## 2020-04-28 NOTE — TELEPHONE ENCOUNTER
Reason for Call:  Medication or medication refill:    Do you use a [facility]?  Name of the pharmacy and phone number for the current request:       [facility] - [place], [place] - [address] [place] [address]        Name of the medication requested: apixaban ANTICOAGULANT (ELIQUIS) 2.5 MG tablet   lisinopril (ZESTRIL) 5 MG tablet  simvastatin (ZOCOR) 20 MG tablet     Other request: Pt reports pharmacy will not transfer.  Also, [facility] reports [facility] do not have refills for eliquis    Can we leave a detailed message on this number? YES    Phone number patient can be reached at: Home number on file [phone] (home)    Best Time: any    Call taken on 4/28/2020 at 9:50 AM by Gayla Brennan

## 2020-04-28 NOTE — TELEPHONE ENCOUNTER
Dr Shaina Lau from [facility] called back again and she really needs the order so they can send all the test in today.   She said it is urgent

## 2020-04-29 NOTE — TELEPHONE ENCOUNTER
Huddled with Dr. Merritt- Who provided VERBAL ORDER for COVID-19 Swab testing for Pt. Call [facility] and have them run test under Dr. Merritt    Called [facility]- Verbal orders were given per PCP. They will fax order over for our records. Order was received as TO (Telephone Order) so no signature is necessary.     Thank you!  YULY High Dr.- please disregard, issues has been resolved.

## 2020-04-29 NOTE — TELEPHONE ENCOUNTER
[phone] is call back to facility - [facility]    Scheduled for a oncare visit, but they will call back if any issues, as most pt's have had order just verbally provided by [facility] by PCP.    Amanda Madera RN

## 2020-04-29 NOTE — TELEPHONE ENCOUNTER
Dr. Valiente-     Can you please assist with this?     Pt is a resident of [facility]     Pt had appt Today with [facility] as recommended by PCP/Clinic, [facility] is messaging (see below) requesting PCP to order COVID SWAB TESTING (Not serology)     [facility] just needs verbal orders from someone to collect test    Can our clinic/PCP give verbals for this order, they have been requesting for 2 days, now.     Thank you,   Mariah GROVES RN

## 2020-04-29 NOTE — TELEPHONE ENCOUNTER
Clarifying Thierno's note. All the nursing facility is looking for is to get Dr. Merritt's name on the order so that [facility] will run it. He doesn't need to be involved with collecting, processing, or managing.    Steven Taylor MD on 4/29/2020 at 2:09 PM

## 2020-04-29 NOTE — TELEPHONE ENCOUNTER
Attempted to call [facility] to assist with scheduling [facility] ([facility]) telephone visit, no answer, unable to leave VM. Number below listed for [facility] is not in service     RECALL     Mariah GROVES RN

## 2020-04-30 NOTE — TELEPHONE ENCOUNTER
Routing refill request to provider for review/approval because:  Drug not on the [facility] refill protocol for age  Labs out of range:    Please authorize if appropriate.  Thanks,  Meliza Camarena RN

## 2020-05-01 NOTE — TELEPHONE ENCOUNTER
Reason for Call:  Other results    Detailed comments: [facility] from [facility] called with results.  COVID19 swab came back as positive. Patient is not symptomatic as of right now.  She is in isolation. [facility] will be faxing the results to [phone].    PCall taken on 5/1/2020 at 11:38 AM by Savannah Castellon

## 2020-05-15 NOTE — TELEPHONE ENCOUNTER
"Message from PCP \"when can pt move back to assisted living with ? when the retest result is negative.\"    Called [facility] at [place]- no answer, left detailed VM    Tai NI RN  "

## 2020-05-15 NOTE — TELEPHONE ENCOUNTER
Tarsha Merritt MD  You; [facility] Triage 6 minutes ago (4:27 PM)      Approved, OK to retest today.          PCP- when can pt move back to assisted living with ?    Thank you,  Tai NI RN

## 2020-06-03 ENCOUNTER — TELEPHONE (OUTPATIENT)
Dept: FAMILY MEDICINE | Facility: CLINIC | Age: 85
End: 2020-06-03

## 2020-06-03 NOTE — TELEPHONE ENCOUNTER
Dr. Merritt,  [facility] Office called to report patient passed away on 06/02/2020. They will be emailing the  Death Certificate for you to complete

## 2022-05-15 NOTE — TELEPHONE ENCOUNTER
Reason for Call:  Other     Detailed comments: Please approve the rx for apixaban ANTICOAGULANT (ELIQUIS) 2.5 MG tablet for a 90 day supply.  [facility] needs this be be sent for 90 day rather than 30 day    Phone Number Patient can be reached at: Home number on file [phone] (home)    Best Time: any    Can we leave a detailed message on this number? YES    Call taken on 7/24/2019 at 9:32 AM by Gayla Brennan       [Dear  ___] : Dear  [unfilled],

## 2022-12-19 NOTE — PROGRESS NOTES
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  Follow up needed: none  Other Healthcare Providers Involved in Patient s Care:         Homecare  Update since discharge: improved.     Post Discharge Medication Reconciliation: discharge medications reconciled and changed, per note/orders (see AVS).  Plan of care communicated with patient and family     Coding guidelines for this visit:  Type of Medical   Decision Making Face-to-Face Visit       within 7 Days of discharge Face-to-Face Visit        within 14 days of discharge   Moderate Complexity [id number]   High Complexity 29816 46103              HPI:   Patient Polly De La Torre is a very pleasant 88 year old female with history of hypertension, atrial fibrillation, history of prior CVA who presents to Internal Medicine clinic today with her  from their assisted living facility home for post hospital discharge follow up of recent mechanical fall. No recent new falls since hospital discharge. No syncope or presyncope events since hospital discharge. patient declined a referral for outpatient PT at this time. Patient is advised to utilize her walking cane and walker more to  "prevent new falls going forward. Regarding the patient's chronic essential hypertension, the patient's BP is at goal, patient is due for a refill of Lisinopril BP medication at this time. No chest pain, headaches, fever or chills.      Current Medications:     Current Outpatient Medications   Medication Sig Dispense Refill     amiodarone (PACERONE/CODARONE) 200 MG tablet TAKE 1 TABLET DAILY 30 tablet 12     apixaban ANTICOAGULANT (ELIQUIS) 2.5 MG tablet Take 1 tablet (2.5 mg) by mouth 2 times daily 180 tablet 3     lisinopril (ZESTRIL) 5 MG tablet Take 1 tablet (5 mg) by mouth daily 90 tablet 3     order for DME Equipment being ordered: walking cane 1 each 3     simvastatin (ZOCOR) 20 MG tablet Take 1 tablet (20 mg) by mouth At Bedtime 90 tablet 3         Allergies:      Allergies   Allergen Reactions     Cetirizine      Made her so tired after taking doesn't want to take again,just can't live that way([employer])     Darvon [Propoxyphene]      Methadone      DARVON  ---  \"crawling\" sensation             Past Medical History:     Past Medical History:   Diagnosis Date     Aortic regurgitation 2015     Aortic valve disorders      Choledocholithiasis     s/p ERCP x 3 2011, ERCP Jan 2014, Dr. Krishnan     Esophageal reflux      Gallstone pancreatitis      Osteoarthrosis, unspecified whether generalized or localized, unspecified site      Osteopenia          Past Surgical History:     Past Surgical History:   Procedure Laterality Date     C NONSPECIFIC PROCEDURE  age 16    Appy     C NONSPECIFIC PROCEDURE      D&C x 2     CHOLE[facility]         Family Medical History:     Family History   Problem Relation Age of Onset     Hypertension Father      Cerebrovascular Disease Father      Allergies Son      Prostate Cancer No family hx of      Cancer - colorectal No family hx of          Social History:     Social History     Socioeconomic History     Marital status:      Spouse name: Not on file     " Number of children: Not on file     Years of education: Not on file     Highest education level: Not on file   Occupational History     Not on file   Social Needs     Financial resource strain: Not on file     Food insecurity:     Worry: Not on file     Inability: Not on file     Transportation needs:     Medical: Not on file     Non-medical: Not on file   Tobacco Use     Smoking status: Never Smoker     Smokeless tobacco: Never Used   Substance and Sexual Activity     Alcohol use: Yes     Alcohol/week: 0.0 standard drinks     Comment: rarely     Drug use: No     Sexual activity: Yes     Partners: Male   Lifestyle     Physical activity:     Days per week: Not on file     Minutes per session: Not on file     Stress: Not on file   Relationships     Social connections:     Talks on phone: Not on file     Gets together: Not on file     Attends [employer] service: Not on file     Active member of club or organization: Not on file     Attends meetings of clubs or organizations: Not on file     Relationship status: Not on file     Intimate partner violence:     Fear of current or ex partner: Not on file     Emotionally abused: Not on file     Physically abused: Not on file     Forced sexual activity: Not on file   Other Topics Concern     Parent/sibling w/ CABG, MI or angioplasty before 65F 55M? Not Asked   Social History Narrative     with 3 kids           Review of System:     Constitutional: Negative for fever or chills  Skin: Negative for rashes  Ears/Nose/Throat: Negative for nasal congestion, sore throat  Respiratory: No shortness of breath, dyspnea on exertion, cough, or hemoptysis  Cardiovascular: Negative for chest pain  Gastrointestinal: Negative for nausea, vomiting  Genitourinary: Negative for dysuria, hematuria  Musculoskeletal: Negative for myalgias, negative for any new falls since hospital discharge  Neurologic: Negative for headaches, positive for history of prior CVA  Psychiatric: Negative for  Consult received for assessment/education - consult placed prior to surgery, N/A at this time.  Information obtained from medical record.  depression, anxiety  Hematologic/Lymphatic/Immunologic: Negative  Endocrine: Negative  Behavioral: Negative for tobacco use       Physical Exam:   /74 (BP Location: Left arm, Patient Position: Sitting, Cuff Size: Adult Regular)   Pulse 80   Temp 97.6  F (36.4  C) (Oral)   Ht 1.524 m (5')   Wt 42.5 kg (93 lb 11.2 oz)   SpO2 94%   BMI 18.30 kg/m      GENERAL: chronically ill appearing elderly female, alert and no acute distress  EYES: eyes grossly normal to inspection, and conjunctivae and sclerae normal  HENT: Normocephalic atraumatic. Nose and mouth without ulcers or lesions  NECK: supple  RESP: lungs clear to auscultation   CV: regular rate and rhythm, normal S1 S2  LYMPH: no peripheral edema   ABDOMEN: nondistended  MS: no gross musculoskeletal defects noted  SKIN: no suspicious lesions or rashes  NEURO: Alert & Oriented x 3.   PSYCH: mentation appears normal, affect normal        Diagnostic Test Results:     Diagnostic Test Results:  Lab Results   Component Value Date    WBC 10.2 02/29/2020     Lab Results   Component Value Date    RBC 4.71 02/29/2020     Lab Results   Component Value Date    HGB 15.0 02/29/2020     Lab Results   Component Value Date    HCT 46.4 02/29/2020     No components found for: MCT  Lab Results   Component Value Date    MCV 99 02/29/2020     Lab Results   Component Value Date    MCH 31.8 02/29/2020     Lab Results   Component Value Date    MCHC 32.3 02/29/2020     Lab Results   Component Value Date    RDW 15.7 02/29/2020     Lab Results   Component Value Date     02/29/2020     Last Comprehensive Metabolic Panel:  Sodium   Date Value Ref Range Status   02/29/2020 135 133 - 144 mmol/L Final     Potassium   Date Value Ref Range Status   02/29/2020 4.2 3.4 - 5.3 mmol/L Final     Chloride   Date Value Ref Range Status   02/29/2020 101 94 - 109 mmol/L Final     Carbon Dioxide   Date Value Ref Range Status   02/29/2020 31 20 - 32 mmol/L Final     Anion Gap   Date Value Ref  Range Status   02/29/2020 3 3 - 14 mmol/L Final     Glucose   Date Value Ref Range Status   02/29/2020 92 70 - 99 mg/dL Final     Urea Nitrogen   Date Value Ref Range Status   02/29/2020 20 7 - 30 mg/dL Final     Creatinine   Date Value Ref Range Status   02/29/2020 0.94 0.52 - 1.04 mg/dL Final     GFR Estimate   Date Value Ref Range Status   02/29/2020 54 (L) >60 mL/min/[1.73_m2] Final     Comment:     Non  GFR Calc  Starting 12/18/2018, serum creatinine based estimated GFR (eGFR) will be   calculated using the Chronic Kidney Disease Epidemiology Collaboration   (CKD-EPI) equation.       Calcium   Date Value Ref Range Status   02/29/2020 7.8 (L) 8.5 - 10.1 mg/dL Final     EXAM: MR BRAIN W/O and W CONTRAST  LOCATION: [facility]  DATE/TIME: 2/27/2020 8:27 PM     INDICATION: Fall with blunt head injury and small age indeterminate infarcts right superior cerebellar hemisphere.  COMPARISON: 02/27/2020.  CONTRAST: 4mL Gadavist  TECHNIQUE: MRI brain without and with contrast.     FINDINGS: On the diffusion-weighted images there is no evidence of acute ischemia or restricted diffusion. There is shine through artifact visualized. There is no discrete mass lesion or midline shift. There is no extra-axial fluid collection or acute   intracranial hemorrhage. On the FLAIR and T2-weighted images there are multiple foci of high signal within the periventricular and subcortical white matter along with old lacunar infarcts involving the cerebellar hemispheres bilaterally. The ventricular   system, basal cisterns and the cortical sulci are consistent with diffuse volume loss. Following the administration of contrast no abnormal enhancement is visualized. There is no evidence of cerebellar tonsillar ectopia. Corpus callosum and the sella   region have appropriate configuration and signal intensity for the patient's age. The orbit regions are unremarkable. There is no significant paranasal sinus disease.  The mastoid air cells and the middle ear regions are clear.                                                                      IMPRESSION:  1. No discrete mass lesion, hemorrhage or focal area of acute ischemia.  2. Diffuse age related changes along with old lacunar infarcts cerebellar hemispheres bilaterally.  3. No abnormal enhancement.    ASSESSMENT/PLAN:       (W19.XXXS) Fall, sequela  (primary encounter diagnosis)  Comment: post hospital discharge follow up of recent mechanical fall. No recent new falls since hospital discharge. No syncope or presyncope events since hospital discharge.   Plan: patient declined a referral for outpatient PT at this time. Patient is advised to utilize her walking cane and walker more to prevent new falls going forward. Continue home healthcare going forward.      (I48.91) Atrial fibrillation with RVR (H)  (Z79.01) Long term current use of anticoagulant therapy  Comment: heart rate is well controlled. No chest pains.  Plan: continue current cardiac medications including Eliquis long term anticoagulation therapy.      (I10) Benign essential hypertension  Comment: BP is at goal, patient is due for a refill of Lisinopril BP medication.  Plan: lisinopril (ZESTRIL) 5 MG tablet    (I63.9) Cerebrovascular accident (CVA), unspecified mechanism (H)  Comment: no headaches, no recent CVA or TIA symptoms  Plan: continue current Eliquis long term anticoagulation therapy.    (E78.5) Hyperlipidemia LDL goal <130  Comment: stable  Plan: continue Simvastatin medication going forward.        Follow Up Plan:     Patient is instructed to return to Internal Medicine clinic for follow-up visit in 1 month.        Tarsha Merritt MD  Internal Medicine  [facility]

## 2023-02-16 NOTE — TELEPHONE ENCOUNTER
Reason for Call:  Other COVID testing     Detailed comments: Tristan from [place] called.  Thepatient tested positive for COVID19 on 4/28 and has been in isolation/precautions since 4/30. Family wants patient to move back to assisted living with her .  Is it too soon to retest for COVID?  Can she be tested today.   Patient has no fever, 02 sats in the 90's.  Not eating well.   Please advise asap.     Phone Number Patient can be reached at: Other phone number:  [phone]  Direct # for tristan at [place]     Best Time: any    Can we leave a detailed message on this number? YES    Call taken on 5/15/2020 at 3:51 PM by Savannah Castellon       María Wadsworth(PA)

## 2024-12-12 NOTE — PROGRESS NOTES
- Given your symptoms and duration of illness, we feel that you can benefit from antibiotic coverage at this time   - A prescription for azithromycin was sent to your pharmacy, please take this medication as prescribed  -Nasacort additionally encouraged secondary to sinus pressure complaints   - Recommend supportive care with increased fluid intake to thin secretions, Ibuprofen or Tylenol as needed for pain or fever, and steamy showers/saline nasal rinses to help clear the nasal passages   - You may consider tea with honey or a cinnamon stick as these have natural antiviral and antibiotic properties   - Call if symptoms worsen or do not improve with these treatments   Care Coordination:    Met w/ patient re: home care orders.  Offered patient a choice of home care agencies. Pt would like to use [facility]. Pt understands they must be homebound. Pt informed of the plan and in agreement with the plan. E-mail referral sent to [facility] updating them on the orders. [facility] phone number placed on discharge instructions.     -A follow-up appointment has been made for this patient.     Jun 18, 2019 11:00 AM CDT  NM SH CV MPI WITH PHILLIP 1 DAY with SCINM1  [facility] CV Nuclear Medicine ([facility]) [address]  [facility] [address] [phone]     Jun 19, 2019  2:00 PM CDT  Office Visit with Tarsha Merritt MD  [facility] ([facility]) [address] [id number] [phone]   Bring a current list of meds and any records pertaining to this visit.  For Physicals, please bring immunization records and any forms needing to be filled out. Please arrive 10 minutes early to complete paperwork.   Jun 25, 2019  1:50 PM CDT  Return Visit with Ciarra Kang PA-C  [facility] ([facility]) [address] [place] [address] [address]  [facility] [address] [phone] OPT 2      Sep 23, 2019 10:45 AM CDT  Return Visit with Igor Harrington MD  [facility] ([facility]) [address] [place] [address] [address]  [facility] [address] [phone] OPT 2         Shannan Kauffman RN, BAN   Care Coordinator  Ph. [phone]

## (undated) RX ORDER — REGADENOSON 0.08 MG/ML
INJECTION, SOLUTION INTRAVENOUS
Status: DISPENSED
Start: 2019-01-01